# Patient Record
Sex: MALE | Race: WHITE | NOT HISPANIC OR LATINO | Employment: OTHER | URBAN - METROPOLITAN AREA
[De-identification: names, ages, dates, MRNs, and addresses within clinical notes are randomized per-mention and may not be internally consistent; named-entity substitution may affect disease eponyms.]

---

## 2017-08-24 RX ORDER — LEVOTHYROXINE SODIUM 200 UG/1
TABLET ORAL
Qty: 90 TABLET | Refills: 1 | Status: SHIPPED | OUTPATIENT
Start: 2017-08-24 | End: 2018-03-27 | Stop reason: SDUPTHER

## 2017-08-24 RX ORDER — TAMSULOSIN HYDROCHLORIDE 0.4 MG/1
CAPSULE ORAL
Qty: 90 CAPSULE | Refills: 1 | Status: SHIPPED | OUTPATIENT
Start: 2017-08-24 | End: 2018-03-27 | Stop reason: SDUPTHER

## 2017-08-24 RX ORDER — ATORVASTATIN CALCIUM 80 MG/1
TABLET, FILM COATED ORAL
Qty: 90 TABLET | Refills: 1 | Status: SHIPPED | OUTPATIENT
Start: 2017-08-24 | End: 2018-03-27 | Stop reason: SDUPTHER

## 2017-11-07 RX ORDER — METOPROLOL SUCCINATE 100 MG/1
TABLET, EXTENDED RELEASE ORAL
Qty: 90 TABLET | Refills: 1 | Status: SHIPPED | OUTPATIENT
Start: 2017-11-07 | End: 2018-03-27 | Stop reason: SDUPTHER

## 2018-03-27 ENCOUNTER — OFFICE VISIT (OUTPATIENT)
Dept: PRIMARY CARE CLINIC | Facility: CLINIC | Age: 78
End: 2018-03-27
Payer: MEDICARE

## 2018-03-27 ENCOUNTER — CLINICAL SUPPORT (OUTPATIENT)
Dept: PRIMARY CARE CLINIC | Facility: CLINIC | Age: 78
End: 2018-03-27
Payer: MEDICARE

## 2018-03-27 VITALS
TEMPERATURE: 99 F | BODY MASS INDEX: 34.59 KG/M2 | HEART RATE: 83 BPM | WEIGHT: 207.63 LBS | RESPIRATION RATE: 18 BRPM | DIASTOLIC BLOOD PRESSURE: 71 MMHG | SYSTOLIC BLOOD PRESSURE: 129 MMHG | OXYGEN SATURATION: 98 % | HEIGHT: 65 IN

## 2018-03-27 DIAGNOSIS — I10 ESSENTIAL HYPERTENSION: ICD-10-CM

## 2018-03-27 DIAGNOSIS — I10 ESSENTIAL HYPERTENSION: Primary | ICD-10-CM

## 2018-03-27 DIAGNOSIS — E78.5 HYPERLIPIDEMIA, UNSPECIFIED HYPERLIPIDEMIA TYPE: ICD-10-CM

## 2018-03-27 DIAGNOSIS — M17.32 POST-TRAUMATIC OSTEOARTHRITIS OF LEFT KNEE: ICD-10-CM

## 2018-03-27 DIAGNOSIS — E03.9 HYPOTHYROIDISM, UNSPECIFIED TYPE: ICD-10-CM

## 2018-03-27 DIAGNOSIS — Z12.5 PROSTATE CANCER SCREENING: ICD-10-CM

## 2018-03-27 DIAGNOSIS — K21.9 GASTROESOPHAGEAL REFLUX DISEASE, ESOPHAGITIS PRESENCE NOT SPECIFIED: ICD-10-CM

## 2018-03-27 DIAGNOSIS — J32.9 SINUSITIS, UNSPECIFIED CHRONICITY, UNSPECIFIED LOCATION: ICD-10-CM

## 2018-03-27 DIAGNOSIS — N40.0 BENIGN PROSTATIC HYPERPLASIA, UNSPECIFIED WHETHER LOWER URINARY TRACT SYMPTOMS PRESENT: ICD-10-CM

## 2018-03-27 DIAGNOSIS — H26.9 CATARACT, UNSPECIFIED CATARACT TYPE, UNSPECIFIED LATERALITY: ICD-10-CM

## 2018-03-27 LAB
ALBUMIN SERPL BCP-MCNC: 3.7 G/DL
ALP SERPL-CCNC: 74 U/L
ALT SERPL W/O P-5'-P-CCNC: 16 U/L
ANION GAP SERPL CALC-SCNC: 4 MMOL/L
AST SERPL-CCNC: 20 U/L
BASOPHILS # BLD AUTO: 0.04 K/UL
BASOPHILS NFR BLD: 0.9 %
BILIRUB SERPL-MCNC: 0.6 MG/DL
BUN SERPL-MCNC: 19 MG/DL
CALCIUM SERPL-MCNC: 8.5 MG/DL
CHLORIDE SERPL-SCNC: 107 MMOL/L
CHOLEST SERPL-MCNC: 67 MG/DL
CHOLEST/HDLC SERPL: 2.7 {RATIO}
CO2 SERPL-SCNC: 25 MMOL/L
COMPLEXED PSA SERPL-MCNC: 1.1 NG/ML
CREAT SERPL-MCNC: 1.4 MG/DL
DIFFERENTIAL METHOD: ABNORMAL
EOSINOPHIL # BLD AUTO: 0.2 K/UL
EOSINOPHIL NFR BLD: 3.9 %
ERYTHROCYTE [DISTWIDTH] IN BLOOD BY AUTOMATED COUNT: 14.1 %
EST. GFR  (AFRICAN AMERICAN): 55.2 ML/MIN/1.73 M^2
EST. GFR  (NON AFRICAN AMERICAN): 47.8 ML/MIN/1.73 M^2
GLUCOSE SERPL-MCNC: 108 MG/DL
HCT VFR BLD AUTO: 27.7 %
HDLC SERPL-MCNC: 25 MG/DL
HDLC SERPL: 37.3 %
HGB BLD-MCNC: 8.2 G/DL
IMM GRANULOCYTES # BLD AUTO: 0.02 K/UL
IMM GRANULOCYTES NFR BLD AUTO: 0.5 %
LDLC SERPL CALC-MCNC: 28.8 MG/DL
LYMPHOCYTES # BLD AUTO: 0.9 K/UL
LYMPHOCYTES NFR BLD: 21.3 %
MCH RBC QN AUTO: 27.1 PG
MCHC RBC AUTO-ENTMCNC: 29.6 G/DL
MCV RBC AUTO: 91 FL
MONOCYTES # BLD AUTO: 0.5 K/UL
MONOCYTES NFR BLD: 12.2 %
NEUTROPHILS # BLD AUTO: 2.7 K/UL
NEUTROPHILS NFR BLD: 61.2 %
NONHDLC SERPL-MCNC: 42 MG/DL
NRBC BLD-RTO: 0 /100 WBC
PLATELET # BLD AUTO: 140 K/UL
PMV BLD AUTO: 12.5 FL
POTASSIUM SERPL-SCNC: 4.3 MMOL/L
PROT SERPL-MCNC: 6.4 G/DL
RBC # BLD AUTO: 3.03 M/UL
SODIUM SERPL-SCNC: 136 MMOL/L
T4 FREE SERPL-MCNC: 1.24 NG/DL
TRIGL SERPL-MCNC: 66 MG/DL
TSH SERPL DL<=0.005 MIU/L-ACNC: 1.02 UIU/ML
WBC # BLD AUTO: 4.41 K/UL

## 2018-03-27 PROCEDURE — 3078F DIAST BP <80 MM HG: CPT | Mod: CPTII,S$GLB,, | Performed by: INTERNAL MEDICINE

## 2018-03-27 PROCEDURE — 84153 ASSAY OF PSA TOTAL: CPT

## 2018-03-27 PROCEDURE — 99499 UNLISTED E&M SERVICE: CPT | Mod: S$GLB,,, | Performed by: INTERNAL MEDICINE

## 2018-03-27 PROCEDURE — 99999 PR PBB SHADOW E&M-EST. PATIENT-LVL IV: CPT | Mod: PBBFAC,,, | Performed by: INTERNAL MEDICINE

## 2018-03-27 PROCEDURE — 99213 OFFICE O/P EST LOW 20 MIN: CPT | Mod: S$GLB,,, | Performed by: INTERNAL MEDICINE

## 2018-03-27 PROCEDURE — 80061 LIPID PANEL: CPT

## 2018-03-27 PROCEDURE — 84443 ASSAY THYROID STIM HORMONE: CPT

## 2018-03-27 PROCEDURE — 84439 ASSAY OF FREE THYROXINE: CPT

## 2018-03-27 PROCEDURE — 3074F SYST BP LT 130 MM HG: CPT | Mod: CPTII,S$GLB,, | Performed by: INTERNAL MEDICINE

## 2018-03-27 PROCEDURE — 80053 COMPREHEN METABOLIC PANEL: CPT

## 2018-03-27 PROCEDURE — 99999 PR PBB SHADOW E&M-EST. PATIENT-LVL II: CPT | Mod: PBBFAC,,,

## 2018-03-27 PROCEDURE — 85025 COMPLETE CBC W/AUTO DIFF WBC: CPT

## 2018-03-27 RX ORDER — AZITHROMYCIN 250 MG/1
TABLET, FILM COATED ORAL
Qty: 6 TABLET | Refills: 0 | Status: SHIPPED | OUTPATIENT
Start: 2018-03-27 | End: 2018-04-01

## 2018-03-27 RX ORDER — LEVOTHYROXINE SODIUM 200 UG/1
200 TABLET ORAL DAILY
Qty: 90 TABLET | Refills: 3 | Status: SHIPPED | OUTPATIENT
Start: 2018-03-27 | End: 2019-03-19 | Stop reason: SDUPTHER

## 2018-03-27 RX ORDER — TAMSULOSIN HYDROCHLORIDE 0.4 MG/1
1 CAPSULE ORAL DAILY
Qty: 90 CAPSULE | Refills: 3 | Status: SHIPPED | OUTPATIENT
Start: 2018-03-27 | End: 2019-03-19 | Stop reason: SDUPTHER

## 2018-03-27 RX ORDER — METHYLPREDNISOLONE 4 MG/1
TABLET ORAL
Qty: 1 PACKAGE | Refills: 0 | Status: SHIPPED | OUTPATIENT
Start: 2018-03-27 | End: 2019-04-25

## 2018-03-27 RX ORDER — METOPROLOL SUCCINATE 100 MG/1
100 TABLET, EXTENDED RELEASE ORAL DAILY
Qty: 90 TABLET | Refills: 3 | Status: SHIPPED | OUTPATIENT
Start: 2018-03-27 | End: 2019-03-19 | Stop reason: SDUPTHER

## 2018-03-27 RX ORDER — ATORVASTATIN CALCIUM 80 MG/1
80 TABLET, FILM COATED ORAL DAILY
Qty: 90 TABLET | Refills: 3 | Status: SHIPPED | OUTPATIENT
Start: 2018-03-27 | End: 2019-03-19 | Stop reason: SDUPTHER

## 2018-03-28 NOTE — PROGRESS NOTES
Subjective:       Patient ID: Hever Reyes Jr. is a 77 y.o. male.    Chief Complaint: Medication Refill    HPI  Pt is here for refill his meds clinically doing well no c/o refuse colonoscopy has BPH see urologist and had prostate surery x 2 no sob cphad recent eye exam cataract no tx yet c/o congestion coughing few days not better  Review of Systems    Objective:      Physical Exam   Constitutional: He is oriented to person, place, and time. He appears well-developed and well-nourished. No distress.   HENT:   Head: Normocephalic and atraumatic.   Right Ear: External ear normal.   Left Ear: External ear normal.   Mouth/Throat: Oropharynx is clear and moist. No oropharyngeal exudate.   Nasal congestion   Eyes: Conjunctivae and EOM are normal. Pupils are equal, round, and reactive to light. Right eye exhibits no discharge. Left eye exhibits no discharge.   Neck: Normal range of motion. Neck supple. No thyromegaly present.   Cardiovascular: Normal rate, regular rhythm, normal heart sounds and intact distal pulses.  Exam reveals no gallop and no friction rub.    No murmur heard.  Pulmonary/Chest: Effort normal and breath sounds normal. No respiratory distress. He has no wheezes. He has no rales. He exhibits no tenderness.   Abdominal: Soft. Bowel sounds are normal. He exhibits no distension. There is no tenderness. There is no rebound and no guarding.   Musculoskeletal: Normal range of motion. He exhibits no edema, tenderness or deformity.   Lymphadenopathy:     He has no cervical adenopathy.   Neurological: He is alert and oriented to person, place, and time.   Skin: Skin is warm and dry. Capillary refill takes less than 2 seconds. No rash noted. No erythema.   Psychiatric: He has a normal mood and affect. Judgment and thought content normal.   Nursing note and vitals reviewed.      Assessment:       1. Essential hypertension    2. Benign prostatic hyperplasia, unspecified whether lower urinary tract symptoms present     3. Hypothyroidism, unspecified type    4. Post-traumatic osteoarthritis of left knee    5. Prostate cancer screening    6. Hyperlipidemia, unspecified hyperlipidemia type    7. Gastroesophageal reflux disease, esophagitis presence not specified    8. Sinusitis, unspecified chronicity, unspecified location    9. Cataract, unspecified cataract type, unspecified laterality        Plan:       Essential hypertension  -     metoprolol succinate (TOPROL-XL) 100 MG 24 hr tablet; Take 1 tablet (100 mg total) by mouth once daily.  Dispense: 90 tablet; Refill: 3  -     CBC auto differential; Future; Expected date: 03/27/2018  -     Comprehensive metabolic panel; Future; Expected date: 03/27/2018  -     POCT EKG 12-LEAD (NOT FOR [facility] USE)  -     X-Ray Chest PA And Lateral; Future; Expected date: 03/27/2018  -     POCT URINE DIPSTICK WITHOUT MICROSCOPE    Benign prostatic hyperplasia, unspecified whether lower urinary tract symptoms present  Comments:  had surgeries x 2 stable no cancer  Orders:  -     tamsulosin (FLOMAX) 0.4 mg Cp24; Take 1 capsule (0.4 mg total) by mouth once daily.  Dispense: 90 capsule; Refill: 3    Hypothyroidism, unspecified type  -     levothyroxine (SYNTHROID) 200 MCG tablet; Take 1 tablet (200 mcg total) by mouth once daily.  Dispense: 90 tablet; Refill: 3  -     TSH; Future; Expected date: 03/27/2018  -     T4, free; Future; Expected date: 03/27/2018    Post-traumatic osteoarthritis of left knee  -     X-Ray Knee AP LAT with Sunrise Left; Future; Expected date: 03/28/2018    Prostate cancer screening  -     PSA, Screening; Future; Expected date: 03/27/2018    Hyperlipidemia, unspecified hyperlipidemia type  -     atorvastatin (LIPITOR) 80 MG tablet; Take 1 tablet (80 mg total) by mouth once daily.  Dispense: 90 tablet; Refill: 3  -     Lipid panel; Future; Expected date: 03/27/2018    Gastroesophageal reflux disease, esophagitis presence not specified  -     ranitidine (ZANTAC) 300 MG tablet; Take  1 tablet (300 mg total) by mouth once daily.  Dispense: 90 tablet; Refill: 3    Sinusitis, unspecified chronicity, unspecified location  -     azithromycin (Z-NA) 250 MG tablet; Take 2 tablets by mouth on day 1; Take 1 tablet by mouth on days 2-5  Dispense: 6 tablet; Refill: 0  -     methylPREDNISolone (MEDROL DOSEPACK) 4 mg tablet; use as directed  Dispense: 1 Package; Refill: 0    Cataract, unspecified cataract type, unspecified laterality

## 2018-03-28 NOTE — PROGRESS NOTES
Patient, Hever Reyes Jr. (MRN #[id number]), presented with a recent Platelet count less than 150 K/uL consistent with the definition of thrombocytopenia (ICD10 - D69.6).    Platelets   Date Value Ref Range Status   03/27/2018 140 (L) 150 - 350 K/uL Final     The patient's thrombocytopenia was monitored, evaluated, addressed and/or treated. This addendum to the medical record is made on 03/28/2018.

## 2018-04-09 ENCOUNTER — CLINICAL SUPPORT (OUTPATIENT)
Dept: PRIMARY CARE CLINIC | Facility: CLINIC | Age: 78
End: 2018-04-09
Payer: MEDICARE

## 2018-04-09 ENCOUNTER — OFFICE VISIT (OUTPATIENT)
Dept: PRIMARY CARE CLINIC | Facility: CLINIC | Age: 78
End: 2018-04-09
Payer: MEDICARE

## 2018-04-09 VITALS
BODY MASS INDEX: 35.12 KG/M2 | WEIGHT: 205.69 LBS | SYSTOLIC BLOOD PRESSURE: 134 MMHG | OXYGEN SATURATION: 97 % | HEART RATE: 69 BPM | DIASTOLIC BLOOD PRESSURE: 88 MMHG | HEIGHT: 64 IN | TEMPERATURE: 98 F | RESPIRATION RATE: 18 BRPM

## 2018-04-09 DIAGNOSIS — D64.9 ANEMIA, UNSPECIFIED TYPE: ICD-10-CM

## 2018-04-09 DIAGNOSIS — D64.9 ANEMIA, UNSPECIFIED TYPE: Primary | ICD-10-CM

## 2018-04-09 LAB
BASOPHILS # BLD AUTO: 0.03 K/UL
BASOPHILS NFR BLD: 0.5 %
DIFFERENTIAL METHOD: ABNORMAL
EOSINOPHIL # BLD AUTO: 0.2 K/UL
EOSINOPHIL NFR BLD: 3.3 %
ERYTHROCYTE [DISTWIDTH] IN BLOOD BY AUTOMATED COUNT: 14.2 %
FOLATE SERPL-MCNC: 17.6 NG/ML
HCT VFR BLD AUTO: 30.6 %
HGB BLD-MCNC: 9.2 G/DL
IMM GRANULOCYTES # BLD AUTO: 0.02 K/UL
IMM GRANULOCYTES NFR BLD AUTO: 0.3 %
IRON SERPL-MCNC: 150 UG/DL
LYMPHOCYTES # BLD AUTO: 1.4 K/UL
LYMPHOCYTES NFR BLD: 23.6 %
MCH RBC QN AUTO: 26.2 PG
MCHC RBC AUTO-ENTMCNC: 30.1 G/DL
MCV RBC AUTO: 87 FL
MONOCYTES # BLD AUTO: 0.6 K/UL
MONOCYTES NFR BLD: 10.6 %
NEUTROPHILS # BLD AUTO: 3.6 K/UL
NEUTROPHILS NFR BLD: 61.7 %
NRBC BLD-RTO: 0 /100 WBC
PLATELET # BLD AUTO: 164 K/UL
PMV BLD AUTO: 11.9 FL
RBC # BLD AUTO: 3.51 M/UL
RETICS/RBC NFR AUTO: 1.9 %
SATURATED IRON: 33 %
TOTAL IRON BINDING CAPACITY: 457 UG/DL
TRANSFERRIN SERPL-MCNC: 309 MG/DL
VIT B12 SERPL-MCNC: >2000 PG/ML
WBC # BLD AUTO: 5.84 K/UL

## 2018-04-09 PROCEDURE — 99999 PR PBB SHADOW E&M-EST. PATIENT-LVL III: CPT | Mod: PBBFAC,,, | Performed by: INTERNAL MEDICINE

## 2018-04-09 PROCEDURE — 85025 COMPLETE CBC W/AUTO DIFF WBC: CPT

## 2018-04-09 PROCEDURE — 82746 ASSAY OF FOLIC ACID SERUM: CPT

## 2018-04-09 PROCEDURE — 3079F DIAST BP 80-89 MM HG: CPT | Mod: CPTII,S$GLB,, | Performed by: INTERNAL MEDICINE

## 2018-04-09 PROCEDURE — 99213 OFFICE O/P EST LOW 20 MIN: CPT | Mod: 25,S$GLB,, | Performed by: INTERNAL MEDICINE

## 2018-04-09 PROCEDURE — 82607 VITAMIN B-12: CPT

## 2018-04-09 PROCEDURE — 83540 ASSAY OF IRON: CPT

## 2018-04-09 PROCEDURE — 99999 PR PBB SHADOW E&M-EST. PATIENT-LVL II: CPT | Mod: PBBFAC,,,

## 2018-04-09 PROCEDURE — 85045 AUTOMATED RETICULOCYTE COUNT: CPT

## 2018-04-09 PROCEDURE — 3075F SYST BP GE 130 - 139MM HG: CPT | Mod: CPTII,S$GLB,, | Performed by: INTERNAL MEDICINE

## 2018-04-09 PROCEDURE — 96372 THER/PROPH/DIAG INJ SC/IM: CPT | Mod: S$GLB,,, | Performed by: INTERNAL MEDICINE

## 2018-04-09 RX ORDER — CYANOCOBALAMIN 1000 UG/ML
2000 INJECTION, SOLUTION INTRAMUSCULAR; SUBCUTANEOUS
Status: COMPLETED | OUTPATIENT
Start: 2018-04-09 | End: 2018-04-09

## 2018-04-09 RX ADMIN — CYANOCOBALAMIN 2000 MCG: 1000 INJECTION, SOLUTION INTRAMUSCULAR; SUBCUTANEOUS at 02:04

## 2018-04-09 NOTE — PROGRESS NOTES
Pt ID verified by  and Name. Allergies verified. B12 2cc to R VG per MD order. No adverse reactions noted. Pt tolerated well.

## 2018-04-10 NOTE — PROGRESS NOTES
Subjective:       Patient ID: Hever Reyes Jr. is a 77 y.o. male.    Chief Complaint: Results    HPI  Pt is here for f/u labs anemia but not symptomatic told anemai brfore by is cardioogist given Fe to take refuse colonoscopy but anabelle cologuard told negative cardiologist  Review of Systems    Objective:      Physical Exam   Constitutional: He is oriented to person, place, and time. He appears well-developed and well-nourished. No distress.   HENT:   Head: Normocephalic and atraumatic.   Right Ear: External ear normal.   Left Ear: External ear normal.   Nose: Nose normal.   Mouth/Throat: Oropharynx is clear and moist. No oropharyngeal exudate.   Eyes: Conjunctivae and EOM are normal. Pupils are equal, round, and reactive to light. Right eye exhibits no discharge. Left eye exhibits no discharge.   Neck: Normal range of motion. Neck supple. No thyromegaly present.   Cardiovascular: Normal rate, regular rhythm, normal heart sounds and intact distal pulses.  Exam reveals no gallop and no friction rub.    No murmur heard.  Pulmonary/Chest: Effort normal and breath sounds normal. No respiratory distress. He has no wheezes. He has no rales. He exhibits no tenderness.   Abdominal: Soft. Bowel sounds are normal. He exhibits no distension. There is no tenderness. There is no rebound and no guarding.   Musculoskeletal: Normal range of motion. He exhibits no edema, tenderness or deformity.   Lymphadenopathy:     He has no cervical adenopathy.   Neurological: He is alert and oriented to person, place, and time.   Skin: Skin is warm and dry. Capillary refill takes less than 2 seconds. No rash noted. No erythema.   Psychiatric: He has a normal mood and affect. Judgment and thought content normal.   Nursing note and vitals reviewed.      Assessment:       1. Anemia, unspecified type        Plan:       Anemia, unspecified type  -     CBC auto differential; Future; Expected date: 04/09/2018  -     Iron and TIBC; Future; Expected  date: 04/09/2018  -     Vitamin B12; Future; Expected date: 04/09/2018  -     Folate; Future; Expected date: 04/09/2018  -     RETICULOCYTES; Future; Expected date: 04/09/2018  -     cyanocobalamin injection 2,000 mcg; Inject 2 mLs (2,000 mcg total) into the muscle one time.

## 2018-04-11 RX ORDER — FERROUS SULFATE 325(65) MG
325 TABLET ORAL 2 TIMES DAILY
Qty: 60 TABLET | Refills: 3 | Status: SHIPPED | OUTPATIENT
Start: 2018-04-11 | End: 2018-08-15 | Stop reason: SDUPTHER

## 2018-08-15 DIAGNOSIS — D64.9 ANEMIA, UNSPECIFIED TYPE: ICD-10-CM

## 2018-08-15 NOTE — TELEPHONE ENCOUNTER
----- Message from Lynda Ervin sent at 8/15/2018 12:57 PM CDT -----  Pt stopped in still waiting for 3 days  on refill on his Ferrous sulf 325mg  iron pill takes 2x per day can you also give extra refills and called into [facility] in [place]

## 2018-08-16 RX ORDER — FERROUS SULFATE 325(65) MG
325 TABLET ORAL 2 TIMES DAILY
Qty: 60 TABLET | Refills: 3 | Status: SHIPPED | OUTPATIENT
Start: 2018-08-16 | End: 2018-12-03 | Stop reason: SDUPTHER

## 2018-12-03 DIAGNOSIS — D64.9 ANEMIA, UNSPECIFIED TYPE: ICD-10-CM

## 2018-12-03 RX ORDER — FERROUS SULFATE 325(65) MG
TABLET ORAL
Qty: 60 TABLET | Refills: 3 | Status: SHIPPED | OUTPATIENT
Start: 2018-12-03 | End: 2019-04-22 | Stop reason: SDUPTHER

## 2019-03-19 DIAGNOSIS — I10 ESSENTIAL HYPERTENSION: ICD-10-CM

## 2019-03-19 DIAGNOSIS — N40.0 BENIGN PROSTATIC HYPERPLASIA, UNSPECIFIED WHETHER LOWER URINARY TRACT SYMPTOMS PRESENT: ICD-10-CM

## 2019-03-19 DIAGNOSIS — E78.5 HYPERLIPIDEMIA, UNSPECIFIED HYPERLIPIDEMIA TYPE: ICD-10-CM

## 2019-03-19 DIAGNOSIS — K21.9 GASTROESOPHAGEAL REFLUX DISEASE, ESOPHAGITIS PRESENCE NOT SPECIFIED: ICD-10-CM

## 2019-03-19 DIAGNOSIS — E03.9 HYPOTHYROIDISM, UNSPECIFIED TYPE: ICD-10-CM

## 2019-03-20 RX ORDER — ATORVASTATIN CALCIUM 80 MG/1
TABLET, FILM COATED ORAL
Qty: 90 TABLET | Refills: 3 | Status: SHIPPED | OUTPATIENT
Start: 2019-03-20 | End: 2020-03-02

## 2019-03-20 RX ORDER — METOPROLOL SUCCINATE 100 MG/1
TABLET, EXTENDED RELEASE ORAL
Qty: 90 TABLET | Refills: 3 | Status: SHIPPED | OUTPATIENT
Start: 2019-03-20 | End: 2020-03-29

## 2019-03-20 RX ORDER — LEVOTHYROXINE SODIUM 200 UG/1
TABLET ORAL
Qty: 90 TABLET | Refills: 3 | Status: SHIPPED | OUTPATIENT
Start: 2019-03-20 | End: 2020-04-28

## 2019-03-20 RX ORDER — TAMSULOSIN HYDROCHLORIDE 0.4 MG/1
CAPSULE ORAL
Qty: 90 CAPSULE | Refills: 3 | Status: SHIPPED | OUTPATIENT
Start: 2019-03-20 | End: 2020-04-28

## 2019-04-22 DIAGNOSIS — D64.9 ANEMIA, UNSPECIFIED TYPE: ICD-10-CM

## 2019-04-23 RX ORDER — FERROUS SULFATE 325(65) MG
TABLET ORAL
Qty: 60 TABLET | Refills: 3 | Status: SHIPPED | OUTPATIENT
Start: 2019-04-23 | End: 2019-08-26 | Stop reason: SDUPTHER

## 2019-04-25 ENCOUNTER — OFFICE VISIT (OUTPATIENT)
Dept: PRIMARY CARE CLINIC | Facility: CLINIC | Age: 79
End: 2019-04-25
Payer: MEDICARE

## 2019-04-25 VITALS
HEART RATE: 66 BPM | TEMPERATURE: 98 F | DIASTOLIC BLOOD PRESSURE: 75 MMHG | WEIGHT: 205.5 LBS | SYSTOLIC BLOOD PRESSURE: 126 MMHG | RESPIRATION RATE: 18 BRPM | OXYGEN SATURATION: 95 % | BODY MASS INDEX: 34.24 KG/M2 | HEIGHT: 65 IN

## 2019-04-25 DIAGNOSIS — M54.50 ACUTE LEFT-SIDED LOW BACK PAIN WITHOUT SCIATICA: ICD-10-CM

## 2019-04-25 DIAGNOSIS — R10.12 LEFT UPPER QUADRANT PAIN: ICD-10-CM

## 2019-04-25 DIAGNOSIS — M54.9 ACUTE LEFT-SIDED BACK PAIN, UNSPECIFIED BACK LOCATION: Primary | ICD-10-CM

## 2019-04-25 LAB
BILIRUB SERPL-MCNC: ABNORMAL MG/DL
BLOOD URINE, POC: ABNORMAL
COLOR, POC UA: YELLOW
GLUCOSE UR QL STRIP: NORMAL
KETONES UR QL STRIP: ABNORMAL
LEUKOCYTE ESTERASE URINE, POC: ABNORMAL
NITRITE, POC UA: ABNORMAL
PH, POC UA: 5
PROTEIN, POC: ABNORMAL
SPECIFIC GRAVITY, POC UA: 1.02
UROBILINOGEN, POC UA: NORMAL

## 2019-04-25 PROCEDURE — 1101F PT FALLS ASSESS-DOCD LE1/YR: CPT | Mod: CPTII,S$GLB,, | Performed by: INTERNAL MEDICINE

## 2019-04-25 PROCEDURE — 81002 POCT URINE DIPSTICK WITHOUT MICROSCOPE: ICD-10-PCS | Mod: S$GLB,,, | Performed by: INTERNAL MEDICINE

## 2019-04-25 PROCEDURE — 99213 OFFICE O/P EST LOW 20 MIN: CPT | Mod: 25,S$GLB,, | Performed by: INTERNAL MEDICINE

## 2019-04-25 PROCEDURE — 3078F PR MOST RECENT DIASTOLIC BLOOD PRESSURE < 80 MM HG: ICD-10-PCS | Mod: CPTII,S$GLB,, | Performed by: INTERNAL MEDICINE

## 2019-04-25 PROCEDURE — 1101F PR PT FALLS ASSESS DOC 0-1 FALLS W/OUT INJ PAST YR: ICD-10-PCS | Mod: CPTII,S$GLB,, | Performed by: INTERNAL MEDICINE

## 2019-04-25 PROCEDURE — 3074F PR MOST RECENT SYSTOLIC BLOOD PRESSURE < 130 MM HG: ICD-10-PCS | Mod: CPTII,S$GLB,, | Performed by: INTERNAL MEDICINE

## 2019-04-25 PROCEDURE — 81002 URINALYSIS NONAUTO W/O SCOPE: CPT | Mod: S$GLB,,, | Performed by: INTERNAL MEDICINE

## 2019-04-25 PROCEDURE — 99999 PR PBB SHADOW E&M-EST. PATIENT-LVL IV: ICD-10-PCS | Mod: PBBFAC,,, | Performed by: INTERNAL MEDICINE

## 2019-04-25 PROCEDURE — 3074F SYST BP LT 130 MM HG: CPT | Mod: CPTII,S$GLB,, | Performed by: INTERNAL MEDICINE

## 2019-04-25 PROCEDURE — 3078F DIAST BP <80 MM HG: CPT | Mod: CPTII,S$GLB,, | Performed by: INTERNAL MEDICINE

## 2019-04-25 PROCEDURE — 99999 PR PBB SHADOW E&M-EST. PATIENT-LVL IV: CPT | Mod: PBBFAC,,, | Performed by: INTERNAL MEDICINE

## 2019-04-25 PROCEDURE — 99213 PR OFFICE/OUTPT VISIT, EST, LEVL III, 20-29 MIN: ICD-10-PCS | Mod: 25,S$GLB,, | Performed by: INTERNAL MEDICINE

## 2019-04-25 RX ORDER — METHYLPREDNISOLONE 4 MG/1
TABLET ORAL
Qty: 1 PACKAGE | Refills: 0 | Status: SHIPPED | OUTPATIENT
Start: 2019-04-25 | End: 2019-05-01

## 2019-04-25 NOTE — PROGRESS NOTES
Subjective:       Patient ID: Hever Reyes Jr. is a 78 y.o. male.    Chief Complaint: Back Pain (lower left side back pain x 2 days)    HPI patient complained of severe left-sided back pain seen yesterday which come and go and it come if the severe 10-10 also pain in the abdomen on the left side to no nausea vomiting trauma no fall no lifting heavy is objects no radiculopathy no hematuria dysuria no skin rash patient deny any history of kidney stone also have chronic allergy nasal drip congestion using nose drops complained of some severe right a itching if he does not use his nose drop no short of breath chest pain  Review of Systems    Objective:      Physical Exam   Constitutional: He is oriented to person, place, and time. He appears well-developed and well-nourished. No distress.   HENT:   Head: Normocephalic and atraumatic.   Right Ear: External ear normal.   Left Ear: External ear normal.   Nose: Nose normal.   Mouth/Throat: Oropharynx is clear and moist. No oropharyngeal exudate.   Eyes: Pupils are equal, round, and reactive to light. Conjunctivae and EOM are normal. Right eye exhibits no discharge. Left eye exhibits no discharge.   Neck: Normal range of motion. Neck supple. No thyromegaly present.   Cardiovascular: Normal rate, regular rhythm, normal heart sounds and intact distal pulses. Exam reveals no gallop and no friction rub.   No murmur heard.  Pulmonary/Chest: Effort normal and breath sounds normal. No respiratory distress. He has no wheezes. He has no rales. He exhibits no tenderness.   Abdominal: Soft. Bowel sounds are normal. He exhibits no distension. There is no tenderness. There is no rebound and no guarding.   Musculoskeletal: Normal range of motion. He exhibits tenderness (Tenderness with palpation on the left flying left CVA no skin rash pain radiated to the front of the abdomen). He exhibits no edema or deformity.   Lymphadenopathy:     He has no cervical adenopathy.   Neurological: He  is alert and oriented to person, place, and time.   Skin: Skin is warm and dry. Capillary refill takes less than 2 seconds. No rash noted. No erythema.   Psychiatric: He has a normal mood and affect. Judgment and thought content normal.   Nursing note and vitals reviewed.      Assessment:       1. Acute left-sided back pain, unspecified back location    2. Left upper quadrant pain    3. Acute left-sided low back pain without sciatica        Plan:       Acute left-sided back pain, unspecified back location  Comments:  Need to rule out kidney stone abdominal aortic aneurysm versus early shingle  Orders:  -     POCT URINE DIPSTICK WITHOUT MICROSCOPE  -     methylPREDNISolone (MEDROL DOSEPACK) 4 mg tablet; use as directed  Dispense: 1 Package; Refill: 0    Left upper quadrant pain  -     US Abdomen Complete; Future; Expected date: 05/02/2019    Acute left-sided low back pain without sciatica  -     X-Ray Lumbar Spine Ap And Lateral; Future; Expected date: 04/25/2019  -     methylPREDNISolone (MEDROL DOSEPACK) 4 mg tablet; use as directed  Dispense: 1 Package; Refill: 0

## 2019-04-26 ENCOUNTER — TELEPHONE (OUTPATIENT)
Dept: PRIMARY CARE CLINIC | Facility: CLINIC | Age: 79
End: 2019-04-26

## 2019-04-26 DIAGNOSIS — N20.0 KIDNEY STONE: Primary | ICD-10-CM

## 2019-04-26 DIAGNOSIS — K80.20 GALLSTONES: ICD-10-CM

## 2019-04-26 NOTE — TELEPHONE ENCOUNTER
Please sign referrals        Please call the patient regarding his abdominal ultrasound have a multiple gallstone and also small kidney stone in both kidneys his lumbar spine x-ray results still pending if his back pain persists patient id appointment to see urologist Dr. Mckay or Dr. Baldwin for kidney stone and surgeon Dr. Marcos for gallstone

## 2019-04-30 NOTE — PROGRESS NOTES
"Subjective:      Hever Reyes Jr. is a 78 y.o. male who was referred by Dr. Carvalho for evaluation of his nephrolithiasis.    The patient reports left flank pain x 2 days that has now resolved. At the time an abdominal US was completed that showed bilateral non obstructing renal stones. No masses or hydronephrosis was noted.       Denies flank/abdominal pain for the last week. Denies urinary symptoms- dysuria, frequency and urgency. Denies hematuria, N/V and fever/chills. Denies a history of stones and recurrent UTIs. States that he "doesn't drink water and loves salt."      The following portions of the patient's history were reviewed and updated as appropriate: allergies, current medications, past family history, past medical history, past social history, past surgical history and problem list.    Review of Systems  Constitutional: no fever or chills  ENT: no nasal congestion or sore throat  Respiratory: no cough or shortness of breath  Cardiovascular: no chest pain or palpitations  Gastrointestinal: no nausea or vomiting, tolerating diet  Genitourinary: as per HPI  Hematologic/Lymphatic: no easy bruising or lymphadenopathy  Musculoskeletal: no arthralgias or myalgias  Neurological: no seizures or tremors  Behavioral/Psych: no auditory or visual hallucinations     Objective:   Vitals:    Vitals:    05/01/19 1417   BP: (!) 154/76   Pulse: 69       Physical Exam   General: alert and oriented, no acute distress  Head: normocephalic, atraumatic  Neck: supple, no lymphadenopathy, normal ROM, no masses  Respiratory: Symmetric expansion, non-labored breathing  Cardiovascular: regular rate and rhythm, nomal pulses, no peripheral edema  Abdomen: soft, non tender, non distended, no palpable masses, no hernias, no hepatomegaly or splenomegaly  Genitourinary: deferred   Lymphatic: no inguinal nodes  Skin: normal coloration and turgor, no rashes, no suspicious skin lesions noted  Neuro: alert and oriented x3, no gross " "deficits  Psych: normal judgment and insight, normal mood/affect and non-anxious  No CVA tenderness    Lab Review   Urinalysis demonstrates positive for protein (trace)  Lab Results   Component Value Date    WBC 5.84 04/09/2018    HGB 9.2 (L) 04/09/2018    HCT 30.6 (L) 04/09/2018    MCV 87 04/09/2018     04/09/2018     Lab Results   Component Value Date    CREATININE 1.4 03/27/2018    BUN 19 03/27/2018     Lab Results   Component Value Date    PSA 1.1 03/27/2018     Imaging   (all images personally reviewed; agree with report below)  JESS (4/25/19)- "Nonobstructing bilateral renal calculi.Nonobstructing bilateral renal calculi."    Assessment:   Nephrolithiasis    Plan:   Hever was seen today for nephrolithiasis.    Diagnoses and all orders for this visit:    Nephrolithiasis  -     X-Ray KUB; Future  -     US Retroperitoneal Complete (Kidney and; Future    Plan:  --Non obstructing stones noted on US. No hydro noted.   --Recommend general preventive measures, to include increased hydration, low Na diet, and increased citrus intake  --Follow up in 6 months with KUB and JESS       "

## 2019-05-01 ENCOUNTER — OFFICE VISIT (OUTPATIENT)
Dept: UROLOGY | Facility: CLINIC | Age: 79
End: 2019-05-01
Payer: MEDICARE

## 2019-05-01 VITALS
SYSTOLIC BLOOD PRESSURE: 154 MMHG | WEIGHT: 205 LBS | BODY MASS INDEX: 34.16 KG/M2 | HEART RATE: 69 BPM | DIASTOLIC BLOOD PRESSURE: 76 MMHG | HEIGHT: 65 IN

## 2019-05-01 DIAGNOSIS — N20.0 NEPHROLITHIASIS: Primary | ICD-10-CM

## 2019-05-01 PROCEDURE — 99203 PR OFFICE/OUTPT VISIT, NEW, LEVL III, 30-44 MIN: ICD-10-PCS | Mod: S$GLB,,, | Performed by: NURSE PRACTITIONER

## 2019-05-01 PROCEDURE — 3077F SYST BP >= 140 MM HG: CPT | Mod: CPTII,S$GLB,, | Performed by: NURSE PRACTITIONER

## 2019-05-01 PROCEDURE — 1101F PT FALLS ASSESS-DOCD LE1/YR: CPT | Mod: CPTII,S$GLB,, | Performed by: NURSE PRACTITIONER

## 2019-05-01 PROCEDURE — 3077F PR MOST RECENT SYSTOLIC BLOOD PRESSURE >= 140 MM HG: ICD-10-PCS | Mod: CPTII,S$GLB,, | Performed by: NURSE PRACTITIONER

## 2019-05-01 PROCEDURE — 3078F DIAST BP <80 MM HG: CPT | Mod: CPTII,S$GLB,, | Performed by: NURSE PRACTITIONER

## 2019-05-01 PROCEDURE — 99203 OFFICE O/P NEW LOW 30 MIN: CPT | Mod: S$GLB,,, | Performed by: NURSE PRACTITIONER

## 2019-05-01 PROCEDURE — 1101F PR PT FALLS ASSESS DOC 0-1 FALLS W/OUT INJ PAST YR: ICD-10-PCS | Mod: CPTII,S$GLB,, | Performed by: NURSE PRACTITIONER

## 2019-05-01 PROCEDURE — 3078F PR MOST RECENT DIASTOLIC BLOOD PRESSURE < 80 MM HG: ICD-10-PCS | Mod: CPTII,S$GLB,, | Performed by: NURSE PRACTITIONER

## 2019-05-01 RX ORDER — AMOXICILLIN 500 MG
CAPSULE ORAL DAILY
COMMUNITY
End: 2021-12-08 | Stop reason: SDUPTHER

## 2019-05-01 RX ORDER — ASPIRIN 81 MG/1
81 TABLET ORAL DAILY
Status: ON HOLD | COMMUNITY
End: 2020-12-14 | Stop reason: HOSPADM

## 2019-05-01 NOTE — LETTER
May 1, 2019      Hever Carvalho MD  [address] [place] [address]           [facility] [address], [address] [address]  Phone: [phone]  Fax: [phone]          Patient: Hever Reyes Jr.   MR Number: [id number]   YOB: 1940   Date of Visit: 5/1/2019       Dear Dr. Hever Carvalho:    Thank you for referring Hever Reyes to me for evaluation. Attached you will find relevant portions of my assessment and plan of care.    If you have questions, please do not hesitate to call me. I look forward to following Hever Reyes along with you.    Sincerely,    Cira Grossman, SHLOMO    Enclosure  CC:  No Recipients    If you would like to receive this communication electronically, please contact [email or web address] or [phone] to request more information on [employer] Link access.    For providers and/or their staff who would like to refer a patient to [facility], please contact us through our one-stop-shop provider referral line, [facility], at [phone].    If you feel you have received this communication in error or would no longer like to receive these types of communications, please e-mail [email or web address]

## 2019-05-02 ENCOUNTER — OFFICE VISIT (OUTPATIENT)
Dept: SURGERY | Facility: CLINIC | Age: 79
End: 2019-05-02
Payer: MEDICARE

## 2019-05-02 VITALS
HEART RATE: 60 BPM | WEIGHT: 201.75 LBS | DIASTOLIC BLOOD PRESSURE: 72 MMHG | SYSTOLIC BLOOD PRESSURE: 133 MMHG | BODY MASS INDEX: 33.57 KG/M2

## 2019-05-02 DIAGNOSIS — K80.20 CALCULUS OF GALLBLADDER WITHOUT CHOLECYSTITIS WITHOUT OBSTRUCTION: Primary | ICD-10-CM

## 2019-05-02 PROCEDURE — 1101F PT FALLS ASSESS-DOCD LE1/YR: CPT | Mod: CPTII,S$GLB,, | Performed by: SURGERY

## 2019-05-02 PROCEDURE — 3078F DIAST BP <80 MM HG: CPT | Mod: CPTII,S$GLB,, | Performed by: SURGERY

## 2019-05-02 PROCEDURE — 3075F SYST BP GE 130 - 139MM HG: CPT | Mod: CPTII,S$GLB,, | Performed by: SURGERY

## 2019-05-02 PROCEDURE — 1101F PR PT FALLS ASSESS DOC 0-1 FALLS W/OUT INJ PAST YR: ICD-10-PCS | Mod: CPTII,S$GLB,, | Performed by: SURGERY

## 2019-05-02 PROCEDURE — 3078F PR MOST RECENT DIASTOLIC BLOOD PRESSURE < 80 MM HG: ICD-10-PCS | Mod: CPTII,S$GLB,, | Performed by: SURGERY

## 2019-05-02 PROCEDURE — 99204 OFFICE O/P NEW MOD 45 MIN: CPT | Mod: S$GLB,,, | Performed by: SURGERY

## 2019-05-02 PROCEDURE — 99999 PR PBB SHADOW E&M-EST. PATIENT-LVL IV: CPT | Mod: PBBFAC,,, | Performed by: SURGERY

## 2019-05-02 PROCEDURE — 99999 PR PBB SHADOW E&M-EST. PATIENT-LVL IV: ICD-10-PCS | Mod: PBBFAC,,, | Performed by: SURGERY

## 2019-05-02 PROCEDURE — 99204 PR OFFICE/OUTPT VISIT, NEW, LEVL IV, 45-59 MIN: ICD-10-PCS | Mod: S$GLB,,, | Performed by: SURGERY

## 2019-05-02 PROCEDURE — 3075F PR MOST RECENT SYSTOLIC BLOOD PRESS GE 130-139MM HG: ICD-10-PCS | Mod: CPTII,S$GLB,, | Performed by: SURGERY

## 2019-05-02 RX ORDER — ENOXAPARIN SODIUM 300 MG/3ML
40 INJECTION INTRAVENOUS; SUBCUTANEOUS
Status: CANCELLED | OUTPATIENT
Start: 2019-05-02

## 2019-05-02 NOTE — LETTER
May 3, 2019      Hever Carvalho MD  [address] [place] [address]           [facility]  [address], [facility] [address] [place] [address]  Phone: [phone]  Fax: [phone]          Patient: Hever Reyes Jr.   MR Number: [id number]   YOB: 1940   Date of Visit: 5/2/2019       Dear Dr. Hever Carvalho:    Thank you for referring Hever Reyes to me for evaluation. Attached you will find relevant portions of my assessment and plan of care.    If you have questions, please do not hesitate to call me. I look forward to following Hever Reyes along with you.    Sincerely,    Liam Ordonez MD    Enclosure  CC:  No Recipients    If you would like to receive this communication electronically, please contact [email or web address] or [phone] to request more information on [employer] Link access.    For providers and/or their staff who would like to refer a patient to [facility], please contact us through our one-stop-shop provider referral line, [facility], at [phone].    If you feel you have received this communication in error or would no longer like to receive these types of communications, please e-mail [email or web address]

## 2019-05-03 NOTE — PROGRESS NOTES
History & Physical    SUBJECTIVE:     History of Present Illness:  Patient is a 78 y.o. male presents with gallstones.  States he also has a history of kidney stones, and passed 1 past few months.  He had an ultrasound of the abdomen showed these medium sized gallstones, and was sent to me for evaluation.  Discussed with patient if he had symptoms abdominal pain after meals or pain with spicy or fatty foods and he states he does not significantly remember this or have significant pain in his right upper quadrant.  Discussed risks and benefits of observation versus proceeding with surgery.  Patient elects to proceed with surgery, laparoscopic cholecystectomy.  Risks and benefits of procedure discussed extensively.    Chief Complaint   Patient presents with    Consult       Review of patient's allergies indicates:  No Known Allergies    Current Outpatient Medications   Medication Sig Dispense Refill    aspirin (ECOTRIN) 81 MG EC tablet Take 81 mg by mouth once daily.      atorvastatin (LIPITOR) 80 MG tablet TAKE 1 TABLET BY MOUTH ONCE DAILY 90 tablet 3    ferrous sulfate (FEOSOL) 325 mg (65 mg iron) Tab tablet TAKE 1 TABLET BY MOUTH TWICE DAILY 60 tablet 3    fish oil-omega-3 fatty acids 300-1,000 mg capsule Take by mouth once daily.      levothyroxine (SYNTHROID) 200 MCG tablet TAKE 1 TABLET BY MOUTH ONCE DAILY 90 tablet 3    metoprolol succinate (TOPROL-XL) 100 MG 24 hr tablet TAKE 1 TABLET BY MOUTH ONCE DAILY 90 tablet 3    multivitamin capsule Take 1 capsule by mouth once daily.      ranitidine (ZANTAC) 300 MG tablet TAKE 1 TABLET BY MOUTH ONCE DAILY 90 tablet 3    tamsulosin (FLOMAX) 0.4 mg Cap TAKE 1 CAPSULE BY MOUTH ONCE DAILY 90 capsule 3     No current facility-administered medications for this visit.        Past Medical History:   Diagnosis Date    Anemia     BPH (benign prostatic hyperplasia)     Coronary artery disease     GERD (gastroesophageal reflux disease)     HLD (hyperlipidemia)      Hypertension     Hyperthyroidism     Nephrolithiasis     Osteoarthritis     Sciatica     Sinusitis      Past Surgical History:   Procedure Laterality Date    CATARACT EXTRACTION      CORONARY ANGIOPLASTY WITH STENT PLACEMENT      PROSTATE BIOPSY       Family History   Family history unknown: Yes     Social History     Tobacco Use    Smoking status: Never Smoker    Smokeless tobacco: Never Used   Substance Use Topics    Alcohol use: No    Drug use: No        Review of Systems:  Review of Systems   Constitutional: Negative for appetite change, fatigue, fever and unexpected weight change.   HENT: Negative for sore throat and trouble swallowing.    Eyes: Negative.    Respiratory: Negative for cough, shortness of breath and wheezing.    Cardiovascular: Negative for chest pain and leg swelling.   Gastrointestinal: Positive for abdominal pain (occasional, but very mild). Negative for abdominal distention, blood in stool, constipation, diarrhea, nausea and vomiting.        Some flank pain   Endocrine: Negative.    Genitourinary: Negative.    Musculoskeletal: Negative for back pain.   Skin: Negative.  Negative for rash.   Allergic/Immunologic: Negative.    Neurological: Negative.    Hematological: Negative.    Psychiatric/Behavioral: Negative for confusion.       OBJECTIVE:     Vital Signs (Most Recent)  Pulse: 60 (05/02/19 1513)  BP: 133/72 (05/02/19 1513)     91.5 kg (201 lb 11.5 oz)     Physical Exam:  Physical Exam   Constitutional: He is oriented to person, place, and time. He appears well-developed and well-nourished.   HENT:   Head: Normocephalic and atraumatic.   Eyes: EOM are normal.   Neck: Normal range of motion.   Cardiovascular: Normal rate and normal heart sounds.   Pulmonary/Chest: Effort normal.   Abdominal: Soft. Bowel sounds are normal. He exhibits no distension. There is no tenderness. There is no rebound. No hernia.   Musculoskeletal: Normal range of motion.   Neurological: He is alert and  oriented to person, place, and time.   Skin: Skin is warm and dry. Capillary refill takes less than 2 seconds.   Psychiatric: He has a normal mood and affect. His behavior is normal.   Nursing note and vitals reviewed.      Laboratory  CBC: Reviewed  CMP: Reviewed  wnl    Diagnostic Results:  US: Reviewed  Gallstones    ASSESSMENT/PLAN:     Symptomatic cholelithiasis    PLAN:Plan     Lap dion

## 2019-05-06 PROBLEM — K80.20 CALCULUS OF GALLBLADDER WITHOUT CHOLECYSTITIS WITHOUT OBSTRUCTION: Status: ACTIVE | Noted: 2019-05-06

## 2019-05-15 ENCOUNTER — OFFICE VISIT (OUTPATIENT)
Dept: SURGERY | Facility: CLINIC | Age: 79
End: 2019-05-15
Payer: MEDICARE

## 2019-05-15 DIAGNOSIS — Z98.890 POST-OPERATIVE STATE: Primary | ICD-10-CM

## 2019-05-15 PROCEDURE — 99024 POSTOP FOLLOW-UP VISIT: CPT | Mod: S$GLB,,, | Performed by: SURGERY

## 2019-05-15 PROCEDURE — 99024 PR POST-OP FOLLOW-UP VISIT: ICD-10-PCS | Mod: S$GLB,,, | Performed by: SURGERY

## 2019-05-15 PROCEDURE — 99999 PR PBB SHADOW E&M-EST. PATIENT-LVL II: CPT | Mod: PBBFAC,,, | Performed by: SURGERY

## 2019-05-15 PROCEDURE — 99999 PR PBB SHADOW E&M-EST. PATIENT-LVL II: ICD-10-PCS | Mod: PBBFAC,,, | Performed by: SURGERY

## 2019-05-15 NOTE — PROGRESS NOTES
Hever Reyes Jr. is a 78 y.o. male patient.   Patient is 2 weeks status post laparoscopic cholecystectomy.  Patient is doing well, tolerating diet, normal bowel movements.  States he had a little discomfort in his right side after significant activity yesterday, he went to graduation.  He denies any complaints after eating, and has eaten several spicy and fatty meal since surgery.  Incisions are well healed.    No diagnosis found.  Past Medical History:   Diagnosis Date    Anemia     BPH (benign prostatic hyperplasia)     Coronary artery disease     GERD (gastroesophageal reflux disease)     HLD (hyperlipidemia)     Hypertension     Hyperthyroidism     Nephrolithiasis     Osteoarthritis     Sciatica     Sinusitis      No past surgical history pertinent negatives on file.  Scheduled Meds:  Continuous Infusions:  PRN Meds:    Review of patient's allergies indicates:  No Known Allergies  There are no hospital problems to display for this patient.    There were no vitals taken for this visit.    Subjective:   Diet: Adequate intake.  Patient reports no nausea.    Activity level: Returning to normal.    Pain control: Well controlled.      Objective:  Vital signs (most recent): There were no vitals taken for this visit.  General appearance: Comfortable.    Lungs:  Normal effort.    Heart: Normal rate.    Abdomen: Abdomen is soft.    Bowel sounds:  Bowel sounds are normal.    Tenderness: There is no abdominal tenderness tenderness.    Wound:  Clean.  There is no dehiscence.    Path- Cholelithiasis, multiple small gallstones   Assessment:   Condition: In stable condition.       S/p Lap dion  RTC PRN  Doing well       Liam Ordonez MD  5/15/2019

## 2019-08-26 DIAGNOSIS — D64.9 ANEMIA, UNSPECIFIED TYPE: ICD-10-CM

## 2019-08-26 RX ORDER — FERROUS SULFATE 325(65) MG
TABLET ORAL
Qty: 60 TABLET | Refills: 3 | Status: SHIPPED | OUTPATIENT
Start: 2019-08-26 | End: 2019-12-27

## 2019-11-01 ENCOUNTER — PATIENT OUTREACH (OUTPATIENT)
Dept: ADMINISTRATIVE | Facility: OTHER | Age: 79
End: 2019-11-01

## 2019-11-10 ENCOUNTER — PATIENT OUTREACH (OUTPATIENT)
Dept: ADMINISTRATIVE | Facility: HOSPITAL | Age: 79
End: 2019-11-10

## 2019-12-03 NOTE — TELEPHONE ENCOUNTER
Please order and send a new med to replace ranitidine     ----- Message from Lynda Ervin sent at 12/3/2019  2:37 PM CST -----  Pt got a letter about his Ranitidine tablets being recalled from [facility] pharmacy in [place]. He wants to see what he needs to do for this meds. Does he stop it and you call order something else or does he stay on it. Call him at [phone].

## 2019-12-04 RX ORDER — FAMOTIDINE 20 MG/1
20 TABLET, FILM COATED ORAL DAILY
Qty: 90 TABLET | Refills: 3 | Status: SHIPPED | OUTPATIENT
Start: 2019-12-04 | End: 2020-12-09 | Stop reason: CLARIF

## 2019-12-26 DIAGNOSIS — D64.9 ANEMIA, UNSPECIFIED TYPE: ICD-10-CM

## 2019-12-27 RX ORDER — FERROUS SULFATE 325(65) MG
TABLET ORAL
Qty: 60 TABLET | Refills: 0 | Status: SHIPPED | OUTPATIENT
Start: 2019-12-27 | End: 2020-02-04

## 2020-02-03 DIAGNOSIS — D64.9 ANEMIA, UNSPECIFIED TYPE: ICD-10-CM

## 2020-02-04 RX ORDER — FERROUS SULFATE 325(65) MG
TABLET ORAL
Qty: 60 TABLET | Refills: 0 | Status: SHIPPED | OUTPATIENT
Start: 2020-02-04 | End: 2020-03-06

## 2020-02-29 DIAGNOSIS — E78.5 HYPERLIPIDEMIA, UNSPECIFIED HYPERLIPIDEMIA TYPE: ICD-10-CM

## 2020-03-02 RX ORDER — ATORVASTATIN CALCIUM 80 MG/1
TABLET, FILM COATED ORAL
Qty: 90 TABLET | Refills: 0 | Status: SHIPPED | OUTPATIENT
Start: 2020-03-02 | End: 2020-07-28

## 2020-03-06 DIAGNOSIS — D64.9 ANEMIA, UNSPECIFIED TYPE: ICD-10-CM

## 2020-03-06 RX ORDER — FERROUS SULFATE 325(65) MG
TABLET ORAL
Qty: 90 TABLET | Refills: 0 | Status: SHIPPED | OUTPATIENT
Start: 2020-03-06 | End: 2020-04-23

## 2020-03-29 DIAGNOSIS — I10 ESSENTIAL HYPERTENSION: ICD-10-CM

## 2020-03-29 RX ORDER — METOPROLOL SUCCINATE 100 MG/1
TABLET, EXTENDED RELEASE ORAL
Qty: 90 TABLET | Refills: 0 | Status: SHIPPED | OUTPATIENT
Start: 2020-03-29 | End: 2020-04-24

## 2020-04-22 DIAGNOSIS — D64.9 ANEMIA, UNSPECIFIED TYPE: ICD-10-CM

## 2020-04-23 RX ORDER — FERROUS SULFATE 325(65) MG
TABLET ORAL
Qty: 90 TABLET | Refills: 0 | Status: SHIPPED | OUTPATIENT
Start: 2020-04-23 | End: 2020-04-24

## 2020-04-24 DIAGNOSIS — I10 ESSENTIAL HYPERTENSION: ICD-10-CM

## 2020-04-24 DIAGNOSIS — D64.9 ANEMIA, UNSPECIFIED TYPE: ICD-10-CM

## 2020-04-24 RX ORDER — FERROUS SULFATE 325(65) MG
TABLET ORAL
Qty: 90 TABLET | Refills: 0 | Status: SHIPPED | OUTPATIENT
Start: 2020-04-24 | End: 2020-07-28

## 2020-04-24 RX ORDER — METOPROLOL SUCCINATE 100 MG/1
TABLET, EXTENDED RELEASE ORAL
Qty: 90 TABLET | Refills: 0 | Status: SHIPPED | OUTPATIENT
Start: 2020-04-24 | End: 2020-07-28

## 2020-04-27 DIAGNOSIS — E03.9 HYPOTHYROIDISM, UNSPECIFIED TYPE: ICD-10-CM

## 2020-04-27 DIAGNOSIS — N40.0 BENIGN PROSTATIC HYPERPLASIA, UNSPECIFIED WHETHER LOWER URINARY TRACT SYMPTOMS PRESENT: ICD-10-CM

## 2020-04-28 RX ORDER — LEVOTHYROXINE SODIUM 200 UG/1
TABLET ORAL
Qty: 90 TABLET | Refills: 0 | Status: SHIPPED | OUTPATIENT
Start: 2020-04-28 | End: 2020-07-28

## 2020-04-28 RX ORDER — TAMSULOSIN HYDROCHLORIDE 0.4 MG/1
CAPSULE ORAL
Qty: 90 CAPSULE | Refills: 0 | Status: SHIPPED | OUTPATIENT
Start: 2020-04-28 | End: 2020-07-28

## 2020-07-27 DIAGNOSIS — D64.9 ANEMIA, UNSPECIFIED TYPE: ICD-10-CM

## 2020-07-27 DIAGNOSIS — E78.5 HYPERLIPIDEMIA, UNSPECIFIED HYPERLIPIDEMIA TYPE: ICD-10-CM

## 2020-07-27 DIAGNOSIS — I10 ESSENTIAL HYPERTENSION: ICD-10-CM

## 2020-07-27 DIAGNOSIS — N40.0 BENIGN PROSTATIC HYPERPLASIA, UNSPECIFIED WHETHER LOWER URINARY TRACT SYMPTOMS PRESENT: ICD-10-CM

## 2020-07-27 DIAGNOSIS — E03.9 HYPOTHYROIDISM, UNSPECIFIED TYPE: ICD-10-CM

## 2020-07-28 RX ORDER — ATORVASTATIN CALCIUM 80 MG/1
TABLET, FILM COATED ORAL
Qty: 90 TABLET | Refills: 5 | Status: ON HOLD | OUTPATIENT
Start: 2020-07-28 | End: 2020-12-14 | Stop reason: HOSPADM

## 2020-07-28 RX ORDER — LEVOTHYROXINE SODIUM 200 UG/1
TABLET ORAL
Qty: 90 TABLET | Refills: 5 | Status: SHIPPED | OUTPATIENT
Start: 2020-07-28 | End: 2021-10-19 | Stop reason: SDUPTHER

## 2020-07-28 RX ORDER — TAMSULOSIN HYDROCHLORIDE 0.4 MG/1
CAPSULE ORAL
Qty: 90 CAPSULE | Refills: 5 | Status: SHIPPED | OUTPATIENT
Start: 2020-07-28 | End: 2021-02-18 | Stop reason: SDUPTHER

## 2020-07-28 RX ORDER — METOPROLOL SUCCINATE 100 MG/1
TABLET, EXTENDED RELEASE ORAL
Qty: 90 TABLET | Refills: 5 | Status: SHIPPED | OUTPATIENT
Start: 2020-07-28 | End: 2021-10-19 | Stop reason: SDUPTHER

## 2020-07-28 RX ORDER — FERROUS SULFATE 325(65) MG
TABLET ORAL
Qty: 90 TABLET | Refills: 5 | Status: SHIPPED | OUTPATIENT
Start: 2020-07-28 | End: 2021-10-19 | Stop reason: SDUPTHER

## 2020-11-13 ENCOUNTER — PATIENT OUTREACH (OUTPATIENT)
Dept: ADMINISTRATIVE | Facility: HOSPITAL | Age: 80
End: 2020-11-13

## 2020-12-09 ENCOUNTER — TELEPHONE (OUTPATIENT)
Dept: PRIMARY CARE CLINIC | Facility: CLINIC | Age: 80
End: 2020-12-09

## 2020-12-09 ENCOUNTER — HOSPITAL ENCOUNTER (INPATIENT)
Facility: HOSPITAL | Age: 80
LOS: 5 days | Discharge: NURSING, REHAB OR LONG-TERM CARE | DRG: 064 | End: 2020-12-14
Attending: EMERGENCY MEDICINE | Admitting: PSYCHIATRY & NEUROLOGY
Payer: MEDICARE

## 2020-12-09 DIAGNOSIS — Z74.09 IMPAIRED MOBILITY AND ADLS: ICD-10-CM

## 2020-12-09 DIAGNOSIS — G93.6 VASOGENIC BRAIN EDEMA: ICD-10-CM

## 2020-12-09 DIAGNOSIS — I61.0 NONTRAUMATIC SUBCORTICAL HEMORRHAGE OF CEREBRAL HEMISPHERE, UNSPECIFIED LATERALITY: ICD-10-CM

## 2020-12-09 DIAGNOSIS — Z78.9 IMPAIRED MOBILITY AND ADLS: ICD-10-CM

## 2020-12-09 DIAGNOSIS — S06.360A TRAUMATIC HEMORRHAGE OF CEREBRUM WITHOUT LOSS OF CONSCIOUSNESS, UNSPECIFIED LATERALITY, INITIAL ENCOUNTER: Primary | ICD-10-CM

## 2020-12-09 DIAGNOSIS — I61.9 NONTRAUMATIC ACUTE HEMORRHAGE OF BASAL GANGLIA: ICD-10-CM

## 2020-12-09 DIAGNOSIS — I61.0 NONTRAUMATIC SUBCORTICAL HEMORRHAGE OF RIGHT CEREBRAL HEMISPHERE: ICD-10-CM

## 2020-12-09 DIAGNOSIS — I61.9 ICH (INTRACEREBRAL HEMORRHAGE): ICD-10-CM

## 2020-12-09 PROBLEM — I25.2 HISTORY OF MYOCARDIAL INFARCTION: Status: ACTIVE | Noted: 2020-12-09

## 2020-12-09 PROBLEM — E78.2 MIXED HYPERLIPIDEMIA: Status: ACTIVE | Noted: 2020-12-09

## 2020-12-09 PROBLEM — N40.0 BPH (BENIGN PROSTATIC HYPERPLASIA): Status: ACTIVE | Noted: 2020-12-09
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
## 2020-12-09 PROBLEM — I10 ESSENTIAL HYPERTENSION: Status: ACTIVE | Noted: 2020-12-09

## 2020-12-09 PROBLEM — S06.2XAA: Status: ACTIVE | Noted: 2020-12-09

## 2020-12-09 PROBLEM — D50.9 IRON DEFICIENCY ANEMIA: Status: ACTIVE | Noted: 2020-12-09

## 2020-12-09 PROBLEM — I25.10 CAD (CORONARY ARTERY DISEASE): Status: ACTIVE | Noted: 2020-12-09

## 2020-12-09 PROBLEM — E03.9 HYPOTHYROIDISM: Status: ACTIVE | Noted: 2020-12-09

## 2020-12-09 LAB
ABO + RH BLD: NORMAL
ALBUMIN SERPL BCP-MCNC: 3.7 G/DL (ref 3.5–5.2)
ALP SERPL-CCNC: 80 U/L (ref 55–135)
ALT SERPL W/O P-5'-P-CCNC: 28 U/L (ref 10–44)
ANION GAP SERPL CALC-SCNC: 11 MMOL/L (ref 8–16)
ASCENDING AORTA: 2.98 CM
AST SERPL-CCNC: 41 U/L (ref 10–40)
AV INDEX (PROSTH): 0.68
AV MEAN GRADIENT: 5 MMHG
AV PEAK GRADIENT: 9 MMHG
AV VALVE AREA: 2.12 CM2
AV VELOCITY RATIO: 0.72
BASOPHILS # BLD AUTO: 0.05 K/UL (ref 0–0.2)
BASOPHILS NFR BLD: 0.5 % (ref 0–1.9)
BILIRUB SERPL-MCNC: 2.8 MG/DL (ref 0.1–1)
BILIRUB UR QL STRIP: NEGATIVE
BLD GP AB SCN CELLS X3 SERPL QL: NORMAL
BSA FOR ECHO PROCEDURE: 2.07 M2
BUN SERPL-MCNC: 16 MG/DL (ref 8–23)
CALCIUM SERPL-MCNC: 9.1 MG/DL (ref 8.7–10.5)
CHLORIDE SERPL-SCNC: 105 MMOL/L (ref 95–110)
CHOLEST SERPL-MCNC: 71 MG/DL (ref 120–199)
CHOLEST/HDLC SERPL: 2.6 {RATIO} (ref 2–5)
CK MB SERPL-MCNC: 4.4 NG/ML (ref 0.1–6.5)
CK MB SERPL-RTO: 0.6 % (ref 0–5)
CK SERPL-CCNC: 710 U/L (ref 20–200)
CLARITY UR REFRACT.AUTO: CLEAR
CO2 SERPL-SCNC: 22 MMOL/L (ref 23–29)
COLOR UR AUTO: YELLOW
CREAT SERPL-MCNC: 1.2 MG/DL (ref 0.5–1.4)
CV ECHO LV RWT: 0.4 CM
DIFFERENTIAL METHOD: ABNORMAL
DOP CALC AO PEAK VEL: 1.51 M/S
DOP CALC AO VTI: 27.23 CM
DOP CALC LVOT AREA: 3.1 CM2
DOP CALC LVOT DIAMETER: 1.99 CM
DOP CALC LVOT PEAK VEL: 1.08 M/S
DOP CALC LVOT STROKE VOLUME: 57.85 CM3
DOP CALCLVOT PEAK VEL VTI: 18.61 CM
ECHO LV POSTERIOR WALL: 0.79 CM (ref 0.6–1.1)
EOSINOPHIL # BLD AUTO: 0.1 K/UL (ref 0–0.5)
EOSINOPHIL NFR BLD: 0.5 % (ref 0–8)
ERYTHROCYTE [DISTWIDTH] IN BLOOD BY AUTOMATED COUNT: 13.1 % (ref 11.5–14.5)
EST. GFR  (AFRICAN AMERICAN): >60 ML/MIN/1.73 M^2
EST. GFR  (NON AFRICAN AMERICAN): 56.4 ML/MIN/1.73 M^2
ESTIMATED AVG GLUCOSE: 103 MG/DL (ref 68–131)
FRACTIONAL SHORTENING: 27 % (ref 28–44)
GLUCOSE SERPL-MCNC: 116 MG/DL (ref 70–110)
GLUCOSE UR QL STRIP: NEGATIVE
HBA1C MFR BLD HPLC: 5.2 % (ref 4–5.6)
HCT VFR BLD AUTO: 40.8 % (ref 40–54)
HDLC SERPL-MCNC: 27 MG/DL (ref 40–75)
HDLC SERPL: 38 % (ref 20–50)
HGB BLD-MCNC: 13.7 G/DL (ref 14–18)
HGB UR QL STRIP: NEGATIVE
IMM GRANULOCYTES # BLD AUTO: 0.03 K/UL (ref 0–0.04)
IMM GRANULOCYTES NFR BLD AUTO: 0.3 % (ref 0–0.5)
INTERVENTRICULAR SEPTUM: 0.8 CM (ref 0.6–1.1)
KETONES UR QL STRIP: ABNORMAL
LA MAJOR: 6.04 CM
LA MINOR: 5.65 CM
LA WIDTH: 3.93 CM
LDLC SERPL CALC-MCNC: 31.6 MG/DL (ref 63–159)
LEFT ATRIUM SIZE: 2.4 CM
LEFT ATRIUM VOLUME INDEX: 23.3 ML/M2
LEFT ATRIUM VOLUME: 46.81 CM3
LEFT INTERNAL DIMENSION IN SYSTOLE: 2.88 CM (ref 2.1–4)
LEFT VENTRICLE DIASTOLIC VOLUME INDEX: 33.46 ML/M2
LEFT VENTRICLE DIASTOLIC VOLUME: 67.16 ML
LEFT VENTRICLE MASS INDEX: 45 G/M2
LEFT VENTRICLE SYSTOLIC VOLUME INDEX: 15.8 ML/M2
LEFT VENTRICLE SYSTOLIC VOLUME: 31.63 ML
LEFT VENTRICULAR INTERNAL DIMENSION IN DIASTOLE: 3.93 CM (ref 3.5–6)
LEFT VENTRICULAR MASS: 90.04 G
LEUKOCYTE ESTERASE UR QL STRIP: NEGATIVE
LYMPHOCYTES # BLD AUTO: 1.4 K/UL (ref 1–4.8)
LYMPHOCYTES NFR BLD: 12.5 % (ref 18–48)
MAGNESIUM SERPL-MCNC: 1.5 MG/DL (ref 1.6–2.6)
MCH RBC QN AUTO: 32.1 PG (ref 27–31)
MCHC RBC AUTO-ENTMCNC: 33.6 G/DL (ref 32–36)
MCV RBC AUTO: 96 FL (ref 82–98)
MONOCYTES # BLD AUTO: 0.9 K/UL (ref 0.3–1)
MONOCYTES NFR BLD: 8.4 % (ref 4–15)
NEUTROPHILS # BLD AUTO: 8.4 K/UL (ref 1.8–7.7)
NEUTROPHILS NFR BLD: 77.8 % (ref 38–73)
NITRITE UR QL STRIP: NEGATIVE
NONHDLC SERPL-MCNC: 44 MG/DL
NRBC BLD-RTO: 0 /100 WBC
PH UR STRIP: 5 [PH] (ref 5–8)
PHOSPHATE SERPL-MCNC: 3.4 MG/DL (ref 2.7–4.5)
PISA TR MAX VEL: 2.53 M/S
PLATELET # BLD AUTO: 109 K/UL (ref 150–350)
PMV BLD AUTO: 10.7 FL (ref 9.2–12.9)
POTASSIUM SERPL-SCNC: 4 MMOL/L (ref 3.5–5.1)
PROT SERPL-MCNC: 6.2 G/DL (ref 6–8.4)
PROT UR QL STRIP: NEGATIVE
RA MAJOR: 4.64 CM
RA PRESSURE: 3 MMHG
RA WIDTH: 3.58 CM
RBC # BLD AUTO: 4.27 M/UL (ref 4.6–6.2)
RIGHT VENTRICULAR END-DIASTOLIC DIMENSION: 3.15 CM
SINUS: 2.85 CM
SODIUM SERPL-SCNC: 138 MMOL/L (ref 136–145)
SP GR UR STRIP: >=1.03 (ref 1–1.03)
STJ: 2.88 CM
T4 FREE SERPL-MCNC: 1.13 NG/DL (ref 0.71–1.51)
TDI LATERAL: 0.06 M/S
TDI SEPTAL: 0.06 M/S
TDI: 0.06 M/S
TR MAX PG: 26 MMHG
TRICUSPID ANNULAR PLANE SYSTOLIC EXCURSION: 2.93 CM
TRIGL SERPL-MCNC: 62 MG/DL (ref 30–150)
TSH SERPL DL<=0.005 MIU/L-ACNC: 0.18 UIU/ML (ref 0.4–4)
TV REST PULMONARY ARTERY PRESSURE: 29 MMHG
URN SPEC COLLECT METH UR: ABNORMAL
WBC # BLD AUTO: 10.77 K/UL (ref 3.9–12.7)

## 2020-12-09 PROCEDURE — 25000003 PHARM REV CODE 250: Performed by: PHYSICIAN ASSISTANT

## 2020-12-09 PROCEDURE — 83735 ASSAY OF MAGNESIUM: CPT

## 2020-12-09 PROCEDURE — 97166 OT EVAL MOD COMPLEX 45 MIN: CPT

## 2020-12-09 PROCEDURE — 99291 CRITICAL CARE FIRST HOUR: CPT | Mod: ,,, | Performed by: PHYSICIAN ASSISTANT

## 2020-12-09 PROCEDURE — 25500020 PHARM REV CODE 255: Performed by: PSYCHIATRY & NEUROLOGY

## 2020-12-09 PROCEDURE — 99291 PR CRITICAL CARE, E/M 30-74 MINUTES: ICD-10-PCS | Mod: ,,, | Performed by: PHYSICIAN ASSISTANT

## 2020-12-09 PROCEDURE — 86901 BLOOD TYPING SEROLOGIC RH(D): CPT

## 2020-12-09 PROCEDURE — 81003 URINALYSIS AUTO W/O SCOPE: CPT

## 2020-12-09 PROCEDURE — 84443 ASSAY THYROID STIM HORMONE: CPT

## 2020-12-09 PROCEDURE — 80053 COMPREHEN METABOLIC PANEL: CPT

## 2020-12-09 PROCEDURE — 20000000 HC ICU ROOM

## 2020-12-09 PROCEDURE — 82550 ASSAY OF CK (CPK): CPT

## 2020-12-09 PROCEDURE — 82553 CREATINE MB FRACTION: CPT

## 2020-12-09 PROCEDURE — 97116 GAIT TRAINING THERAPY: CPT

## 2020-12-09 PROCEDURE — 84439 ASSAY OF FREE THYROXINE: CPT

## 2020-12-09 PROCEDURE — 92610 EVALUATE SWALLOWING FUNCTION: CPT

## 2020-12-09 PROCEDURE — 97530 THERAPEUTIC ACTIVITIES: CPT

## 2020-12-09 PROCEDURE — 84100 ASSAY OF PHOSPHORUS: CPT

## 2020-12-09 PROCEDURE — 99223 PR INITIAL HOSPITAL CARE,LEVL III: ICD-10-PCS | Mod: ,,, | Performed by: PSYCHIATRY & NEUROLOGY

## 2020-12-09 PROCEDURE — 83036 HEMOGLOBIN GLYCOSYLATED A1C: CPT

## 2020-12-09 PROCEDURE — 99223 1ST HOSP IP/OBS HIGH 75: CPT | Mod: ,,, | Performed by: PSYCHIATRY & NEUROLOGY

## 2020-12-09 PROCEDURE — 97162 PT EVAL MOD COMPLEX 30 MIN: CPT

## 2020-12-09 PROCEDURE — 99291 CRITICAL CARE FIRST HOUR: CPT | Mod: 25

## 2020-12-09 PROCEDURE — 80061 LIPID PANEL: CPT

## 2020-12-09 PROCEDURE — 85025 COMPLETE CBC W/AUTO DIFF WBC: CPT

## 2020-12-09 RX ORDER — AMOXICILLIN 250 MG
1 CAPSULE ORAL 2 TIMES DAILY
Status: DISCONTINUED | OUTPATIENT
Start: 2020-12-09 | End: 2020-12-14

## 2020-12-09 RX ORDER — ACETAMINOPHEN 325 MG/1
650 TABLET ORAL EVERY 6 HOURS PRN
Status: DISCONTINUED | OUTPATIENT
Start: 2020-12-09 | End: 2020-12-14 | Stop reason: HOSPADM

## 2020-12-09 RX ORDER — ATORVASTATIN CALCIUM 20 MG/1
40 TABLET, FILM COATED ORAL DAILY
Status: DISCONTINUED | OUTPATIENT
Start: 2020-12-09 | End: 2020-12-11

## 2020-12-09 RX ORDER — LABETALOL HCL 20 MG/4 ML
10 SYRINGE (ML) INTRAVENOUS EVERY 4 HOURS PRN
Status: DISCONTINUED | OUTPATIENT
Start: 2020-12-09 | End: 2020-12-14 | Stop reason: HOSPADM

## 2020-12-09 RX ORDER — ONDANSETRON 2 MG/ML
4 INJECTION INTRAMUSCULAR; INTRAVENOUS EVERY 8 HOURS PRN
Status: DISCONTINUED | OUTPATIENT
Start: 2020-12-09 | End: 2020-12-14 | Stop reason: HOSPADM

## 2020-12-09 RX ORDER — SODIUM CHLORIDE 0.9 % (FLUSH) 0.9 %
10 SYRINGE (ML) INJECTION
Status: DISCONTINUED | OUTPATIENT
Start: 2020-12-09 | End: 2020-12-14 | Stop reason: HOSPADM

## 2020-12-09 RX ORDER — ACETAMINOPHEN 325 MG/1
650 TABLET ORAL EVERY 6 HOURS PRN
Status: DISCONTINUED | OUTPATIENT
Start: 2020-12-09 | End: 2020-12-09

## 2020-12-09 RX ORDER — LEVOTHYROXINE SODIUM 100 UG/1
200 TABLET ORAL
Status: DISCONTINUED | OUTPATIENT
Start: 2020-12-09 | End: 2020-12-14 | Stop reason: HOSPADM

## 2020-12-09 RX ORDER — OXYCODONE HYDROCHLORIDE 5 MG/1
5 TABLET ORAL EVERY 6 HOURS PRN
Status: DISCONTINUED | OUTPATIENT
Start: 2020-12-09 | End: 2020-12-14 | Stop reason: HOSPADM

## 2020-12-09 RX ORDER — METOPROLOL TARTRATE 25 MG/1
25 TABLET, FILM COATED ORAL 3 TIMES DAILY
Status: DISCONTINUED | OUTPATIENT
Start: 2020-12-09 | End: 2020-12-11

## 2020-12-09 RX ADMIN — ATORVASTATIN CALCIUM 40 MG: 20 TABLET, FILM COATED ORAL at 10:12

## 2020-12-09 RX ADMIN — LEVOTHYROXINE SODIUM 200 MCG: 100 TABLET ORAL at 06:12

## 2020-12-09 RX ADMIN — METOPROLOL TARTRATE 25 MG: 25 TABLET, FILM COATED ORAL at 04:12

## 2020-12-09 RX ADMIN — DOCUSATE SODIUM 50MG AND SENNOSIDES 8.6MG 1 TABLET: 8.6; 5 TABLET, FILM COATED ORAL at 10:12

## 2020-12-09 RX ADMIN — LABETALOL HCL IV SOLN PREFILLED SYRINGE 20 MG/4ML (5 MG/ML) 10 MG: 20/4 SOLUTION PREFILLED SYRINGE at 10:12

## 2020-12-09 RX ADMIN — ACETAMINOPHEN 650 MG: 325 TABLET ORAL at 10:12

## 2020-12-09 RX ADMIN — METOPROLOL TARTRATE 25 MG: 25 TABLET, FILM COATED ORAL at 09:12

## 2020-12-09 RX ADMIN — IOHEXOL 75 ML: 350 INJECTION, SOLUTION INTRAVENOUS at 04:12

## 2020-12-09 NOTE — PT/OT/SLP EVAL
Occupational Therapy   Evaluation and Treatment    Name: Hever Reyes Jr.  MRN: [id number]  Admitting Diagnosis:  Nontraumatic acute hemorrhage of basal ganglia   Length of Stay: 0 days  Recent Surgery: * No surgery found *      Recommendations:   Discharge Recommendations: rehabilitation facility  Discharge Equipment Recommendations:  (TBD with progression in care)  Barriers to discharge:  None    Assessment:   Hever Reyes Jr. is a 80 y.o. male with a medical diagnosis of Nontraumatic acute hemorrhage of basal ganglia.  He presents with L hemiparesis and coordination deficits affecting his functional indep with ADLs/self care and mobility from his reported indep baseline. Despite patient's co-morbidities,patient was living in community setting functioning at independent level for ADLs, and mobility prior to this event.  There is an expectation of returning to prior level of function to maintain independence thus avoiding readmission.  Patient's clinical condition meets full Inpatient Rehab (IPR) criteria; including the ability to actively participate in 3 hours of therapy.  A lower level of care(SNF) cannot provide the interdisciplinary treatment approach needed.     OT to cont to follow up in acute setting to ensure best d/c planning needs.  Performance deficits affecting function: weakness, impaired endurance, impaired sensation, impaired self care skills, impaired functional mobilty, gait instability, decreased upper extremity function, decreased lower extremity function, impaired balance, visual deficits, decreased safety awareness, edema, impaired skin.      Pt is safe to stand pivot assist x1 to bedside commode for toileting task.     Rehab Prognosis: Good; patient would benefit from acute skilled OT services to address these deficits and reach maximum level of function.       Plan:     Patient to be seen 4 x/week to address the above listed problems via self-care/home management, therapeutic activities,  therapeutic exercises, neuromuscular re-education  · Plan of Care Expires: 01/08/21  · Plan of Care Reviewed with: patient    Subjective     Chief Complaint: none reported   Patient/Family Comments/goals: home    Occupational Profile:  Living Environment:  Pt lives with son, daughter in law and 3 grandchildren (ages 14, 13, 10). SSH with 0 JUNIOR. Tub/shower combo.  Previous level of function: indep with ADLs/self care/mobility. Reported 0 other falls this year, using no DME.  Roles and Routines: father, grand father, care taker to self, home and community dweller; no longer driving. Retired    Equipment Used at Home:  none (reported they own a rolling walker)  Assistance upon Discharge: limited 24 hour (reported son works)    Pain/Comfort:  · Pain Rating 1: 0/10  · Pain Rating Post-Intervention 1: 0/10    Patients cultural, spiritual, Taoist conflicts given the current situation: no    Objective:   Communicated with: RN prior to session.    Patient found HOB elevated with blood pressure cuff, telemetry, pulse ox (continuous), peripheral IV upon OT entry to room.    General Precautions: Standard, aspiration, fall, seizure, vision impaired   Orthopedic Precautions:N/A   Braces: N/A     Occupational Performance:  Bed Mobility:    · Patient completed Rolling/Turning to Right with minimum assistance  · Patient completed Supine to Sit with minimum assistance  · Patient completed Sit to Supine with moderate assistance    Functional Mobility/Transfers:  · Patient completed Sit <> Stand Transfer with moderate assistance  with  hand-held assist   · Patient completed Step Transfer technique with moderate assistance with no assistive device  · Functional Mobility: mod(A) for side steps at EOB   · Increased weakness for LLE with task    Activities of Daily Living:  · Feeding:  contact guard assistance for cup holding with R hand; noted spillage from L corner of mouth-- RN aware  · Grooming: unable to complete in  standing 2/2 dynamic balance impairement; set up and min(A) EOB-- assist for LUE    · Upper Body Dressing: moderate assistance don and doff gown jacket EOB  · Lower Body Dressing: maximal assistance increased assistance LLE    Cognitive/Visual Perceptual:  Cognitive/Psychosocial Skills:     -       Oriented to: Person and Situation   -       Follows Commands/attention:Follows two-step commands  -       Communication: dysarthria  -       Memory: Poor immediate recall  -       Safety awareness/insight to disability: intact   -       Mood/Affect/Coping skills/emotional control: Cooperative  Visual/Perceptual:      -Impaired  ocular motor control L eye      Physical Exam:  Postural examination/scapula alignment:    -       Rounded shoulders  -       Forward head  -       Posterior pelvic tilt  Skin integrity: Abrasion of L knee and L UE  Edema:  None noted  Sensation:    -       Intact  light/touch B UE  Motor Planning:    -       Impaired; mild drift L  Dominant hand:    -       R  Upper Extremity Range of Motion:     -       Right Upper Extremity: WFL  -       Left Upper Extremity: WFL  Upper Extremity Strength:    -       Right Upper Extremity: WFL  -       Left Upper Extremity: 4+/5 shoulder flex   Strength:    -       Right Upper Extremity: WFL  -       Left Upper Extremity: WFL  Fine Motor Coordination:    -       Intact  Left hand, finger to nose, Left hand thumb/finger opposition skills and Left hand, manipulation of objects  Gross motor coordination:   L hemiplegia/paresis    AMPAC 6 Click ADL:  AMPAC Total Score: 17  Body mass index is 34.45 kg/m².  Vitals:    12/09/20 1401   BP: 135/68   Pulse: 86   Resp: 20   Temp:        Treatment & Education:  -Pt alert and agreeable to therapy session; edu on OT role in care  -verbal daily orientation provided   -EOB/stretcher with CGA for postural control  -static standing with mod(A) and B hand held assistance  -edu on call light for staff assistance and need for  staff supervision/assistance for OOB activity   -Communication board updated; questions/concerns addressed within OT scope of practice      Patient left HOB elevated with all lines intact, call button in reach and RN notified    GOALS:   Multidisciplinary Problems     Occupational Therapy Goals        Problem: Occupational Therapy Goal    Goal Priority Disciplines Outcome Interventions   Occupational Therapy Goal     OT, PT/OT Ongoing, Progressing    Description: Goals to be met by: 12/23     Patient will increase functional independence with ADLs by performing:    UE Dressing with Contact Guard Assistance.  LE Dressing (pants, brief) with Contact Guard Assistance.  Grooming while standing at sink with Contact Guard Assistance.  Toileting from toilet with Minimal Assistance for hygiene and clothing management.   Supine to sit with Contact Guard Assistance.  Toilet transfer to toilet with Contact Guard Assistance.  Functional mobility at household distance for ADL task with min(A).   Pt will verbalize s/s of stroke with teachback.                    History:     Past Medical History:   Diagnosis Date    Anemia     BPH (benign prostatic hyperplasia)     Coronary artery disease     GERD (gastroesophageal reflux disease)     HLD (hyperlipidemia)     Hypertension     Hyperthyroidism     Nephrolithiasis     Osteoarthritis     Sciatica     Sinusitis        Past Surgical History:   Procedure Laterality Date    CATARACT EXTRACTION      CORONARY ANGIOPLASTY WITH STENT PLACEMENT      LAPAROSCOPIC CHOLECYSTECTOMY N/A 5/6/2019    Procedure: CHOLECYSTECTOMY, LAPAROSCOPIC;  Surgeon: Liam Ordonez MD;  Location: [facility];  Service: General;  Laterality: N/A;  Aminah video confirmed 5/3/dme    PROSTATE BIOPSY         Time Tracking:     OT Date of Treatment: 12/09/20  OT Start Time: 1044  OT Stop Time: 1102  OT Total Time (min): 18 min    Billable Minutes:Evaluation 10  Self Care/Home Management George JOY  Jadon, LOTR 12/9/2020

## 2020-12-09 NOTE — ASSESSMENT & PLAN NOTE
-Stroke risk factor.  The patient is prescribed atorvastatin 80 mg daily.  -LDL 31.6.  -Recommend atorvastatin 20 mg daily

## 2020-12-09 NOTE — ASSESSMENT & PLAN NOTE
Pt is 80yom w pmh CAD, HTN, hyperthyroid, HLD who presents with multipke falls and recent fall thi morning found down after getting out of bed where he lost balance and strength in his legs, denies LOC. Pt AOx4 and states he feelse generalized weakness. CTH showed acute R BG hemorhhage 2.5cm in size. NSGY consulted for evaluation.     Plan:  Admit NCC  Q1hr neurochecks  CBC, CMP, coags, type & screen  HOB>30  SBP<140  Na 135-150  Keppra  NPO  Repeat CTH in 6hrs with CTA

## 2020-12-09 NOTE — ED NOTES
Pt resting comfortably and independently repositioned in stretcher with bed locked in lowest position for safety. NAD noted at this time. Respirations even and unlabored and visible chest rise noted.  Patient offered bathroom assistance and denies need at this time. Pt instructed to call if assistance is needed. Condom cath present to bedside drainage. Pt on continuous cardiac, BP, and O2 monitoring. Call light within reach. No needs at this time. Will continue to monitor.

## 2020-12-09 NOTE — ASSESSMENT & PLAN NOTE
2.5cm R BG IPH with mild mass effect, 6mm right to left shift, and effacement of the right lateral ventricle on CTH s/p fall    -on ASA 81 daily and fish oil 1000mg at home, hold in acute setting  -coags WNL on admit  -NSGY consulted, awaiting reccs  -repeat CTH pending  -avoid AC/AP in setting of bleed  -SBP <160  -PT/OT/SLP  -q1hr neuro checks

## 2020-12-09 NOTE — H&P
"[facility]  Neurocritical Care  History & Physical    Admit Date: 12/9/2020  Service Date: 12/09/2020  Length of Stay: 0    Subjective:     Chief Complaint: Traumatic hemorrhage of basal ganglia    History of Present Illness: 81 yo M presents with 2.5cm R BG IPH with mild mass effect, 6mm right to left shift, and effacement of the right lateral ventricle s/p fall. Pt and bedside family report he fell this morning and was found down 2 hours after falling. Pt describes his fall as "I think my leg gave out. I was getting up from the toilet and walking back to another room and then I was just on the floor and couldn't get myself up." He denies LOC, dizziness, or syncope, and his home meds include ASA 81mg daily and fish oil 1000mg, without AC use. Coags in ED WNL. PMH includes: anemia, BPH, CAD/MI 5 years ago, GERD, hyperlipidemia, hypertension, hyperthyroidism, kidney stones, osteoarthritis, sciatica, and sinusitis. Pt is being admitted to [facility] for monitoring and management of R BG IPH.    Past Medical History:   Diagnosis Date    Anemia     BPH (benign prostatic hyperplasia)     Coronary artery disease     GERD (gastroesophageal reflux disease)     HLD (hyperlipidemia)     Hypertension     Hyperthyroidism     Nephrolithiasis     Osteoarthritis     Sciatica     Sinusitis      Past Surgical History:   Procedure Laterality Date    CATARACT EXTRACTION      CORONARY ANGIOPLASTY WITH STENT PLACEMENT      LAPAROSCOPIC CHOLECYSTECTOMY N/A 5/6/2019    Procedure: CHOLECYSTECTOMY, LAPAROSCOPIC;  Surgeon: Liam Ordonez MD;  Location: [facility];  Service: General;  Laterality: N/A;  Aminah video confirmed 5/3/dme    PROSTATE BIOPSY        Current Facility-Administered Medications on File Prior to Encounter   Medication Dose Route Frequency Provider Last Rate Last Dose    [COMPLETED] ondansetron injection 4 mg  4 mg Intravenous ED 1 Time Sheng Prater III, MD   4 mg at 12/08/20 " 214     Current Outpatient Medications on File Prior to Encounter   Medication Sig Dispense Refill    aspirin (ECOTRIN) 81 MG EC tablet Take 81 mg by mouth once daily.      atorvastatin (LIPITOR) 80 MG tablet Take 1 tablet by mouth once daily 90 tablet 5    famotidine (PEPCID) 20 MG tablet Take 1 tablet (20 mg total) by mouth once daily. 90 tablet 3    ferrous sulfate (FEOSOL) 325 mg (65 mg iron) Tab tablet Take 1 tablet by mouth twice daily 90 tablet 5    fish oil-omega-3 fatty acids 300-1,000 mg capsule Take by mouth once daily.      levothyroxine (SYNTHROID) 200 MCG tablet Take 1 tablet by mouth once daily 90 tablet 5    metoprolol succinate (TOPROL-XL) 100 MG 24 hr tablet Take 1 tablet by mouth once daily 90 tablet 5    multivitamin capsule Take 1 capsule by mouth once daily.      tamsulosin (FLOMAX) 0.4 mg Cap Take 1 capsule by mouth once daily 90 capsule 5      Allergies: Patient has no known allergies.    Family History   Family history unknown: Yes     Social History     Tobacco Use    Smoking status: Never Smoker    Smokeless tobacco: Never Used   Substance Use Topics    Alcohol use: No    Drug use: No     Review of Systems   Constitutional: Negative for activity change, chills and fever.   HENT: Positive for sneezing. Negative for congestion, rhinorrhea and trouble swallowing.    Eyes: Negative for pain and redness.        Baseline poor eye sight, unchanged post fall   Respiratory: Negative for chest tightness, shortness of breath and wheezing.    Cardiovascular: Negative for chest pain and palpitations.   Gastrointestinal: Negative for abdominal pain, constipation, diarrhea, nausea and vomiting.   Endocrine: Negative for polyuria.   Genitourinary: Negative for difficulty urinating, dysuria and urgency.   Musculoskeletal: Positive for arthralgias. Negative for neck pain and neck stiffness.        Rib pain from fall   Skin: Negative for rash.   Neurological: Positive for weakness and  headaches. Negative for dizziness, tremors, seizures, syncope, speech difficulty, light-headedness and numbness.        Feels weak, couldn't get himself up after falling this AM   Psychiatric/Behavioral: Negative for agitation, behavioral problems and confusion.     Objective:     Vitals:    Temp: 98 °F (36.7 °C)  Pulse: 99  BP: (!) 151/72  MAP (mmHg): 102  Resp: 17  SpO2: 98 %  O2 Device (Oxygen Therapy): nasal cannula    Temp  Min: 97.9 °F (36.6 °C)  Max: 98 °F (36.7 °C)  Pulse  Min: 72  Max: 103  BP  Min: 120/63  Max: 167/94  MAP (mmHg)  Min: 80  Max: 102  Resp  Min: 16  Max: 18  SpO2  Min: 92 %  Max: 100 %    No intake/output data recorded.           Physical Exam  Constitutional:       General: He is not in acute distress.     Appearance: He is obese.   HENT:      Head: Normocephalic.      Right Ear: External ear normal.      Left Ear: External ear normal.      Nose: Nose normal. No rhinorrhea.      Mouth/Throat:      Mouth: Mucous membranes are moist.      Pharynx: Oropharynx is clear.   Eyes:      General:         Right eye: No discharge.         Left eye: No discharge.      Conjunctiva/sclera: Conjunctivae normal.      Comments: Right coloboma baseline, Left eye leftward gaze preference baseline   Neck:      Musculoskeletal: Neck supple. No neck rigidity.   Cardiovascular:      Rate and Rhythm: Normal rate and regular rhythm.      Pulses: Normal pulses.   Pulmonary:      Effort: Pulmonary effort is normal. No respiratory distress.      Breath sounds: No stridor. No wheezing or rhonchi.   Abdominal:      General: There is no distension.      Palpations: Abdomen is soft. There is no mass.      Tenderness: There is no abdominal tenderness.      Hernia: No hernia is present.   Musculoskeletal:         General: Signs of injury present. No deformity.      Right lower leg: No edema.      Left lower leg: No edema.   Skin:     General: Skin is warm and dry.      Findings: No rash.   Neurological:      Mental Status:  He is alert.      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         Behavior: Behavior normal.       Unable to test coordination, gait due to level of consciousness.    Today I personally reviewed pertinent medications, lines/drains/airways, imaging, cardiology results, laboratory results, microbiology results, notably:  CTH, coags    Assessment/Plan:     Cardiac/Vascular  History of myocardial infarction  - hx of MI 5 years ago  -CAD  - continue cardiac monitoring  -cardiac enzymes WNL on admit  -hold home ASA/fish oil    CAD (coronary artery disease)  -hold AS and fish oil in acute setting    Essential hypertension  SBP <160, labetalol prn    -takes metoprolol ER 100mg at home, hold ER in acute setting  -convert to metoprolol IR 50 BID    Neuro  Vasogenic brain edema  -see IPH  - q1hr neuro checks  -repeat imaging if exam change    * hemorrhage of basal ganglia  2.5cm R BG IPH with mild mass effect, 6mm right to left shift, and effacement of the right lateral ventricle on CTH s/p fall    -on ASA 81 daily and fish oil 1000mg at home, hold in acute setting  -coags WNL on admit  -NSGY and VN consulted, awaiting reccs  -follow up CTA pending, abnormal location for traumatic bleed  -avoid AC/AP in setting of acute bleed  -admit to neuro ICU  -SBP <160  -q1hr neuro checks  -PT/OT/SLP      Renal/  BPH (benign prostatic hyperplasia)  Continue home tamsulosin      Oncology  Iron deficiency anemia  History of, takes iron 65 mg at home  -daily CBC    Endocrine  Hypothyroidism  - continue home levothyroxine    Orthopedic  Fall due to stumbling  Pt states his leg gave  out when walking  - PT/OT  -CXR pending for rib eval        The patient is being Prophylaxed for:  Venous Thromboembolism with: Mechanical  Stress Ulcer with: Not Applicable    Ventilator Pneumonia with: not applicable    Activity Orders          Diet NPO: NPO starting at 12/09 0159        Full Code     Critical care time: 35 min due to high possibility of decline in patient's status.    Keira Kim PA-C  Neurocritical Care  [facility]

## 2020-12-09 NOTE — ED NOTES
Pt sleeping comfortably and independently repositioned in stretcher with bed locked in lowest position for safety. NAD noted at this time. Respirations even and unlabored and visible chest rise noted.  Patient offered bathroom assistance and denies need at this time. Pt instructed to call if assistance is needed. Pt on continuous cardiac, BP, and O2 monitoring. Call light within reach. Son at bedside. No needs at this time. Will continue to monitor.

## 2020-12-09 NOTE — ED NOTES
LOC: The patient is awake, alert, and oriented to place and time but not situation. Affect is appropriate.  Speech is appropriate and clear.     APPEARANCE: Patient resting comfortably in no acute distress.  Patient cleaned with clean hospital gown applied.    SKIN: The skin is warm and dry; color consistent with ethnicity.  Patient has normal skin turgor and moist mucus membranes.  Skin intact; no breakdown or bruising noted. Pt has rash to left back and redness to right elbow; denies pain or itching; pt unsure of origin or onset of rash and redness.     MUSCULOSKELETAL: Patient moving upper and lower extremities without difficulty.  Generalized weakness.     RESPIRATORY: Airway is open and patent. Respirations spontaneous, even, easy, and non-labored.  Patient has a normal effort and rate.  No accessory muscle use noted. Denies cough and SOB.     CARDIAC:  Normal rhythm and rate noted.  No peripheral edema noted. No complaints of chest pain.     ABDOMEN: Soft and non tender to palpation.  No distention noted. Denies n/v/d and abd pain.    : Pt had episode of urinary incontinence. Pt's soiled clothing removed. Pt cleaned and linens changed. Condom cath applied to gravity drainage.     NEUROLOGIC: Eyes open spontaneously.  Behavior appropriate to situation.  Follows commands; facial expression symmetrical.  Purposeful motor response noted; normal sensation in all extremities. Denies numbness and tingling.    Pt instructed to call if assistance is needed. Pt on continuous cardiac, BP, and O2 monitoring. Call light within reach. Updated on POC. No needs at this time. Will continue to monitor.

## 2020-12-09 NOTE — ASSESSMENT & PLAN NOTE
-Moderate area of vasogenic cerebral edema identified when reviewing brain imaging in the territory of the R middle cerebral artery. There is mass effect associated with it. We will continue to monitor the patients clinical exam for any worsening of symptoms which may indicate expansion of the stroke or the area of the edema resulting in the clinical change. The pattern is suggestive of hypertensive vs brain mass etiology.  -Neurosurgery following  -Repeat brain imaging pending

## 2020-12-09 NOTE — ASSESSMENT & PLAN NOTE
Hever Reyes Jr. is a 80 y.o. male with a significant medical history of BPH, GERD, HLD, HTN, OA, CAD, and hypothyroidism who presents to the hospital as a transfer from [facility] for evaluation of ICH.  He states his leg gave out causing him to fall and he was unable to get up without assistance.  A CTH revealed a right basal ganglia hemorrhage.    Antithrombotics for secondary stroke prevention: Antiplatelets: None: Intracerebral Hemorrhage    Statins for secondary stroke prevention and hyperlipidemia, if present:   Statins: Atorvastatin- 20 mg daily    Aggressive risk factor modification: HTN, HLD, CAD     Rehab efforts: The patient has been evaluated by a stroke team provider and the therapy needs have been fully considered based off the presenting complaints and exam findings. The following therapy evaluations are needed: PT evaluate and treat, OT evaluate and treat, SLP evaluate and treat, PM&R evaluate for appropriate placement    Diagnostics ordered/pending: MRI head w/ without contrast to assess brain parenchyma, TTE to assess cardiac function/status     VTE prophylaxis: Mechanical prophylaxis: Place SCDs  None: Reason for No Pharmacological VTE Prophylaxis: History of systemic or intracranial bleeding    BP parameters: ICH: SBP <140

## 2020-12-09 NOTE — ED TRIAGE NOTES
80 year old male transfer for Neurosurgery after being found to have ICH at OSH. Alert and oriented. No deficits.

## 2020-12-09 NOTE — PLAN OF CARE
OT eval completed; rec rehab pending progression in care. Aixa RUFINO ROULA Diop 12/9/2020   Problem: Occupational Therapy Goal  Goal: Occupational Therapy Goal  Description: Goals to be met by: 12/23     Patient will increase functional independence with ADLs by performing:    UE Dressing with Contact Guard Assistance.  LE Dressing (pants, brief) with Contact Guard Assistance.  Grooming while standing at sink with Contact Guard Assistance.  Toileting from toilet with Minimal Assistance for hygiene and clothing management.   Supine to sit with Contact Guard Assistance.  Toilet transfer to toilet with Contact Guard Assistance.  Functional mobility at household distance for ADL task with min(A).   Pt will verbalize s/s of stroke with teachback.   Outcome: Ongoing, Progressing

## 2020-12-09 NOTE — CONSULTS
[facility]  Neurosurgery  Consult Note    Consults  Subjective:     Chief Complaint/Reason for Admission: multiple falls and weaknsess    History of Present Illness: Pt is 80yom w pmh CAD, HTN, hyperthyroid, HLD who presents with multipke falls and recent fall thi morning found down after getting out of bed where he lost balance and strength in his legs, denies LOC. Pt AOx4 and states he feelse generalized weakness. CTH showed acute R BG hemorhhage 2.5cm in size. NSGY consulted for evaluation.        (Not in a hospital admission)      Review of patient's allergies indicates:  No Known Allergies    Past Medical History:   Diagnosis Date    Anemia     BPH (benign prostatic hyperplasia)     Coronary artery disease     GERD (gastroesophageal reflux disease)     HLD (hyperlipidemia)     Hypertension     Hyperthyroidism     Nephrolithiasis     Osteoarthritis     Sciatica     Sinusitis      Past Surgical History:   Procedure Laterality Date    CATARACT EXTRACTION      CORONARY ANGIOPLASTY WITH STENT PLACEMENT      LAPAROSCOPIC CHOLECYSTECTOMY N/A 5/6/2019    Procedure: CHOLECYSTECTOMY, LAPAROSCOPIC;  Surgeon: Liam Ordonez MD;  Location: [facility];  Service: General;  Laterality: N/A;  Aminah video confirmed 5/3/dme    PROSTATE BIOPSY       Family History     Family history is unknown by patient.        Tobacco Use    Smoking status: Never Smoker    Smokeless tobacco: Never Used   Substance and Sexual Activity    Alcohol use: No    Drug use: No    Sexual activity: Not on file     Review of Systems   Constitutional: Negative for activity change and fatigue.   Eyes: Negative for photophobia and visual disturbance.   Respiratory: Positive for chest tightness. Negative for shortness of breath.    Cardiovascular: Positive for chest pain.   Gastrointestinal: Negative for nausea and vomiting.   Musculoskeletal: Negative for back pain, neck pain and neck stiffness.   Neurological:  Positive for dizziness, weakness and headaches. Negative for speech difficulty, light-headedness and numbness.     Objective:     Weight: 93.9 kg (207 lb)  Body mass index is 34.45 kg/m².  Vital Signs (Most Recent):  Temp: 98 °F (36.7 °C) (12/09/20 0109)  Pulse: 97 (12/09/20 0300)  Resp: 20 (12/09/20 0300)  BP: 132/71 (12/09/20 0300)  SpO2: 96 % (12/09/20 0300) Vital Signs (24h Range):  Temp:  [97.9 °F (36.6 °C)-98 °F (36.7 °C)] 98 °F (36.7 °C)  Pulse:  [] 97  Resp:  [16-20] 20  SpO2:  [92 %-100 %] 96 %  BP: (120-167)/(49-94) 132/71                          Physical Exam:  Nursing note and vitals reviewed.    Constitutional: He appears well-developed and well-nourished.     Cardiovascular: Normal rate and regular rhythm.     Abdominal: Soft.     Skin: Skin displays no lesions on extremities.     Psych/Behavior: He is alert. He is oriented to person, place, and time.     Musculoskeletal:        Right Upper Extremities: Range of motion is full. Tone is normal.        Left Upper Extremities: Range of motion is full. Tone is normal.       Right Lower Extremities: Range of motion is full. Tone is normal.        Left Lower Extremities: Range of motion is full. Tone is normal.     Neurological:        Coordination: He has normal finger to nose coordination and normal heel to shin coordination.        Sensory: There is no sensory deficit in the trunk. There is no sensory deficit in the extremities.        Cranial nerves: Cranial nerve(s) II, III, IV, V, VI, VII, VIII, IX, X, XI and XII are intact.   AAOx4  CN grossly intact  Pupils reactive 2mm,  coloboma R eye   L gaze preference in left eye baseline  Follows commands x4   5/5 strength x 4  SILT             Significant Labs:  Recent Labs   Lab 12/08/20 2104 12/09/20  0233    116*   * 138   K 3.9 4.0   * 105   CO2 21* 22*   BUN 17 16   CREATININE 1.3 1.2   CALCIUM 9.2 9.1   MG  --  1.5*     Recent Labs   Lab 12/08/20  2104 12/09/20  0233   WBC  12.50 10.77   HGB 15.0 13.7*   HCT 43.4 40.8   * 109*     Recent Labs   Lab 12/08/20  2104   INR 1.1   APTT 24.8     Microbiology Results (last 7 days)     ** No results found for the last 168 hours. **        All pertinent labs from the last 24 hours have been reviewed.    Significant Diagnostics:  CT: Ct Head Without Contrast    Result Date: 12/8/2020  2.5 cm right basal ganglia hemorrhage at the level of the thalamus/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  There is no given history of hypertension and correlation is needed with follow-up recommended to exclude other etiology for hemorrhage. There is mild 6 mm right to left shift of midline structures but no significant herniation.  No evidence of intraventricular hemorrhage, hydrocephalus or other sick convincing acute finding. Extensive white matter low density likely is related to chronic small vessel ischemic changes.  Component of white matter infarct is not excluded.  MRI with diffusion imaging could further evaluate.  No evidence of major arterial infarction. Atrophy and additional incidental nonacute findings as above. This report was flagged in Epic as abnormal. Findings were telephoned to the emergency room team at time of interpretation 9: 45. Electronically signed by: Christen Garcia Date:    12/08/2020 Time:    21:52  MRI: No results found in the last 24 hours.  I have reviewed all pertinent imaging results/findings within the past 24 hours.    Assessment/Plan:     * Nontraumatic acute hemorrhage of basal ganglia  Pt is 80yom w pmh CAD, HTN, hyperthyroid, HLD who presents with multipke falls and recent fall thi morning found down after getting out of bed where he lost balance and strength in his legs, denies LOC. Pt AOx4 and states he feelse generalized weakness. CTH showed acute R BG hemorhhage 2.5cm in size. NSGY consulted for evaluation.     Plan:  Admit NCC  Q1hr neurochecks  CBC, CMP, coags, type &  screen  HOB>30  SBP<140  Na 135-150  Keppra  NPO  Repeat CTH in 6hr  Recommend MRI brain w and wo contrast for further evaluation.            Thank you for your consult. I will follow-up with patient. Please contact us if you have any additional questions.    Seth Allen MD  Neurosurgery  [facility]

## 2020-12-09 NOTE — CONSULTS
[facility]  Vascular Neurology  Comprehensive Stroke Center  Consult Note    Inpatient consult to Vascular (Stroke) Neurology  Consult performed by: Miranda Mayer, SHIVA, NP  Consult ordered by: Keira Kim PA-C  Reason for consult: transfer, ICH        Assessment/Plan:     * Nontraumatic acute hemorrhage of basal ganglia  Hever Reyes Jr. is a 80 y.o. male with a significant medical history of BPH, GERD, HLD, HTN, OA, CAD, and hypothyroidism who presents to the hospital as a transfer from [facility] for evaluation of ICH.  He states his leg gave out causing him to fall and he was unable to get up without assistance.  A CTH revealed a right basal ganglia hemorrhage.    Antithrombotics for secondary stroke prevention: Antiplatelets: None: Intracerebral Hemorrhage    Statins for secondary stroke prevention and hyperlipidemia, if present:   Statins: Atorvastatin- 20 mg daily    Aggressive risk factor modification: HTN, HLD, CAD     Rehab efforts: The patient has been evaluated by a stroke team provider and the therapy needs have been fully considered based off the presenting complaints and exam findings. The following therapy evaluations are needed: PT evaluate and treat, OT evaluate and treat, SLP evaluate and treat, PM&R evaluate for appropriate placement    Diagnostics ordered/pending: MRI head w/ without contrast to assess brain parenchyma, TTE to assess cardiac function/status     VTE prophylaxis: Mechanical prophylaxis: Place SCDs  None: Reason for No Pharmacological VTE Prophylaxis: History of systemic or intracranial bleeding    BP parameters: ICH: SBP <140        Vasogenic brain edema  -Moderate area of vasogenic cerebral edema identified when reviewing brain imaging in the territory of the R middle cerebral artery. There is mass effect associated with it. We will continue to monitor the patients clinical exam for any worsening of symptoms which may indicate  expansion of the stroke or the area of the edema resulting in the clinical change. The pattern is suggestive of hypertensive vs brain mass etiology.  -Neurosurgery following  -Repeat brain imaging pending        Essential hypertension  -Stroke risk factor.  SBP<140.    Mixed hyperlipidemia  -Stroke risk factor.  The patient is prescribed atorvastatin 80 mg daily.  -LDL 31.6.  -Recommend atorvastatin 20 mg daily    CAD (coronary artery disease)  Stroke risk factor.  Hold ASA for now 2/2 ICH.    Hypothyroidism  -Stroke risk factor.  Continue home levothyroxine.        STROKE DOCUMENTATION          NIH Scale:  Interval: baseline  1a. Level of Consciousness: 0-->Alert, keenly responsive  1b. LOC Questions: 0-->Answers both questions correctly  1c. LOC Commands: 0-->Performs both tasks correctly  2. Best Gaze: 1-->Partial gaze palsy, gaze is abnormal in one or both eyes, but forced deviation or total gaze paresis is not present(baseline gaze abnormality of the left eye)  3. Visual: 0-->No visual loss  4. Facial Palsy: 0-->Normal symmetrical movements  5a. Motor Arm, Left: 1-->Drift, limb holds 90 (or 45) degrees, but drifts down before full 10 seconds, does not hit bed or other support  5b. Motor Arm, Right: 0-->No drift, limb holds 90 (or 45) degrees for full 10 secs  6a. Motor Leg, Left: 0-->No drift, leg holds 30 degree position for full 5 secs  6b. Motor Leg, Right: 0-->No drift, leg holds 30 degree position for full 5 secs  7. Limb Ataxia: 0-->Absent  8. Sensory: 0-->Normal, no sensory loss  9. Best Language: 0-->No aphasia, normal  10. Dysarthria: 0-->Normal  11. Extinction and Inattention (formerly Neglect): 0-->No abnormality  Total (NIH Stroke Scale): 2    Modified [place] Score: 1  [place] Coma Scale:15   ABCD2 Score:    [id number] Score:   HAS -BLED Score:   ICH Score:0  Hunt & Banegas Classification:       Thrombolysis Candidate? No, CT findings (ICH, SAH, etc)     Delays to Thrombolysis?  No    Interventional  "Revascularization Candidate?   Is the patient eligible for mechanical endovascular reperfusion (LORETTA)?  No; ICH/ SAH       Hemorrhagic change of an Ischemic Stroke: Does this patient have an ischemic stroke with hemorrhagic changes? No     Subjective:     History of Present Illness:  Hever Reyes Jr. is a 80 y.o. male with a significant medical history of BPH, GERD, HLD, HTN, OA, CAD, and hypothyroidism who presents to the hospital as a transfer from [facility] for evaluation of ICH.  The patient was in his usual state of health and states he stood up and attempted to walk and his legs gave out.  He fell subsequently striking the left side of his body including his chest and head.  The patient states he was then unable to get up from the floor without assistance.  He denies LOC or any preceding symptoms.  He presented to the [facility] ED where a CTH was obtained and revealed a right ICH.  The patient was transferred to [facility] for further evaluation by Neurosurgery, higher level of care.  Currently the patient is AA&O x 4.  He is complaining of a right frontal headache and RUE heaviness"  but currently denies dizziness, chest pain, N/V, abdominal pain, or change in vision.  On exam the patient is noted to have a dysconjugate gaze and surgical pupil which are baseline for the patient.  He was seen by Neurosurgery while in ED.  No neurosurgical intervention at this time.  The patient is admitted to [facility].          Past Medical History:   Diagnosis Date    Anemia     BPH (benign prostatic hyperplasia)     Coronary artery disease     GERD (gastroesophageal reflux disease)     HLD (hyperlipidemia)     Hypertension     Hyperthyroidism     Nephrolithiasis     Osteoarthritis     Sciatica     Sinusitis      Past Surgical History:   Procedure Laterality Date    CATARACT EXTRACTION      CORONARY ANGIOPLASTY WITH STENT PLACEMENT      LAPAROSCOPIC CHOLECYSTECTOMY N/A 5/6/2019    Procedure: " CHOLECYSTECTOMY, LAPAROSCOPIC;  Surgeon: Liam Ordonez MD;  Location: [facility] OR;  Service: General;  Laterality: N/A;  Aminah video confirmed 5/3/dme    PROSTATE BIOPSY       Family History   Family history unknown: Yes     Social History     Tobacco Use    Smoking status: Never Smoker    Smokeless tobacco: Never Used   Substance Use Topics    Alcohol use: No    Drug use: No     Review of patient's allergies indicates:  No Known Allergies    Medications: I have reviewed the current medication administration record.    Current Outpatient Medications   Medication Instructions    aspirin (ECOTRIN) 81 mg, Oral, Daily    atorvastatin (LIPITOR) 80 MG tablet Take 1 tablet by mouth once daily    famotidine (PEPCID) 20 mg, Oral, Daily    ferrous sulfate (FEOSOL) 325 mg (65 mg iron) Tab tablet Take 1 tablet by mouth twice daily    fish oil-omega-3 fatty acids 300-1,000 mg capsule Oral, Daily    levothyroxine (SYNTHROID) 200 MCG tablet Take 1 tablet by mouth once daily    metoprolol succinate (TOPROL-XL) 100 MG 24 hr tablet Take 1 tablet by mouth once daily    multivitamin capsule 1 capsule, Oral, Daily    tamsulosin (FLOMAX) 0.4 mg Cap Take 1 capsule by mouth once daily         Review of Systems   Constitutional: Negative for chills, fatigue and fever.   HENT: Negative for drooling, facial swelling and trouble swallowing.    Eyes: Negative for photophobia, pain and visual disturbance.   Respiratory: Positive for chest tightness (currently resolved). Negative for cough, shortness of breath and wheezing.    Cardiovascular: Positive for chest pain (currently resolved). Negative for palpitations and leg swelling.   Gastrointestinal: Negative for abdominal pain, constipation, diarrhea, nausea and vomiting.   Endocrine: Negative for cold intolerance and heat intolerance.   Genitourinary: Negative for dysuria and hematuria.   Musculoskeletal: Positive for gait problem. Negative for neck pain and neck  stiffness.   Skin: Negative for rash and wound.   Allergic/Immunologic: Negative for environmental allergies and food allergies.   Neurological: Positive for dizziness (currently resolved), weakness and headaches (frontal). Negative for tremors, light-headedness and numbness.   Hematological: Negative for adenopathy. Does not bruise/bleed easily.   Psychiatric/Behavioral: Negative for agitation, confusion and hallucinations.     Objective:     Vital Signs (Most Recent):  Temp: 98 °F (36.7 °C) (12/09/20 0109)  Pulse: 95 (12/09/20 0500)  Resp: 15 (12/09/20 0500)  BP: (!) 153/90 (12/09/20 0500)  SpO2: 95 % (12/09/20 0500)    Vital Signs Range (Last 24H):  Temp:  [97.9 °F (36.6 °C)-98 °F (36.7 °C)]   Pulse:  []   Resp:  [15-20]   BP: (120-167)/(49-94)   SpO2:  [92 %-100 %]     Physical Exam  Vitals signs and nursing note reviewed.   Constitutional:       General: He is not in acute distress.     Appearance: He is well-developed. He is not diaphoretic.   HENT:      Head: Normocephalic and atraumatic.      Right Ear: External ear normal.      Left Ear: External ear normal.      Nose: Nose normal.      Mouth/Throat:      Mouth: Mucous membranes are moist.   Eyes:      General: No scleral icterus.        Right eye: No discharge.         Left eye: No discharge.      Extraocular Movements:      Left eye: Abnormal extraocular motion present.      Conjunctiva/sclera: Conjunctivae normal.      Pupils:      Right eye: Pupil is not round.   Neck:      Musculoskeletal: Normal range of motion and neck supple.   Cardiovascular:      Rate and Rhythm: Normal rate and regular rhythm.   Pulmonary:      Effort: Pulmonary effort is normal. No respiratory distress.      Breath sounds: Normal breath sounds.   Abdominal:      General: Bowel sounds are normal. There is no distension.      Palpations: Abdomen is soft.      Tenderness: There is no abdominal tenderness.   Skin:     General: Skin is warm and dry.      Capillary Refill:  Capillary refill takes less than 2 seconds.   Neurological:      Mental Status: He is alert and oriented to person, place, and time.      Motor: Weakness present.         Neurological Exam:   LOC: alert  Attention Span: Good   Articulation: No dysarthria  Facial Movement (CN VII): Symmetric facial expression    Sensation: Intact to light touch, temperature and vibration      Laboratory:  CMP:   Recent Labs   Lab 12/09/20 0233   CALCIUM 9.1   ALBUMIN 3.7   PROT 6.2      K 4.0   CO2 22*      BUN 16   CREATININE 1.2   ALKPHOS 80   ALT 28   AST 41*   BILITOT 2.8*     CBC:   Recent Labs   Lab 12/09/20 0233   WBC 10.77   RBC 4.27*   HGB 13.7*   HCT 40.8   *   MCV 96   MCH 32.1*   MCHC 33.6     Lipid Panel:   Recent Labs   Lab 12/09/20 0233   CHOL 71*   LDLCALC 31.6*   HDL 27*   TRIG 62     Coagulation:   Recent Labs   Lab 12/08/20 2104   INR 1.1   APTT 24.8     Hgb A1C:   Recent Labs   Lab 12/09/20 0233   HGBA1C 5.2     TSH:   Recent Labs   Lab 12/09/20 0233   TSH 0.184*       Diagnostic Results:      Brain imaging:  CTH 12/8/2020 2.5 cm right basal ganglia hemorrhage at the level of the thalamus/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  There is no given history of hypertension and correlation is needed with follow-up recommended to exclude other etiology for hemorrhage.  There is mild 6 mm right to left shift of midline structures but no significant herniation.  No evidence of intraventricular hemorrhage, hydrocephalus or other sick convincing acute finding.  Extensive white matter low density likely is related to chronic small vessel ischemic changes. Component of white matter infarct is not excluded.  MRI with diffusion imaging could further evaluate.  No evidence of major arterial infarction.  Atrophy and additional incidental nonacute findings as above.      Vessel Imaging:  CTA Head and Neck pending    Cardiac Evaluation:   TTE pending      Miranda Mayer DNP,  NP  [facility]  Department of Vascular Neurology   [facility]     This medical record was prepared using voice recognition software and may contain phonetic and/or or grammatical errors.  Garbled syntax, mangled pronounciations, and other bizarre constructions may be attributed to that system.

## 2020-12-09 NOTE — ED NOTES
PT at bedside evaluating pt; pt tolerating well. Pt resting comfortably and independently repositioned in stretcher with bed locked in lowest position for safety. NAD noted at this time. Respirations even and unlabored and visible chest rise noted.  Patient offered bathroom assistance and denies need at this time. Pt instructed to call if assistance is needed. Pt on continuous cardiac, BP, and O2 monitoring. Call light within reach. Son at bedside. Updated on POC. No needs at this time. Will continue to monitor.

## 2020-12-09 NOTE — ASSESSMENT & PLAN NOTE
SBP <160, labetalol prn    -takes metoprolol ER 100mg at home, hold ER in acute setting  -can convert to metoprolol IR 50 BID if needed

## 2020-12-09 NOTE — ED NOTES
OT at bedside evaluating pt. Pt resting comfortably and independently repositioned in stretcher with bed locked in lowest position for safety. NAD noted at this time. Respirations even and unlabored and visible chest rise noted.  Patient offered bathroom assistance and denies need at this time. Pt instructed to call if assistance is needed. Pt on continuous cardiac, BP, and O2 monitoring. Call light within reach. No needs at this time. Will continue to monitor.

## 2020-12-09 NOTE — SUBJECTIVE & OBJECTIVE
Past Medical History:   Diagnosis Date    Anemia     BPH (benign prostatic hyperplasia)     Coronary artery disease     GERD (gastroesophageal reflux disease)     HLD (hyperlipidemia)     Hypertension     Hyperthyroidism     Nephrolithiasis     Osteoarthritis     Sciatica     Sinusitis      Past Surgical History:   Procedure Laterality Date    CATARACT EXTRACTION      CORONARY ANGIOPLASTY WITH STENT PLACEMENT      LAPAROSCOPIC CHOLECYSTECTOMY N/A 5/6/2019    Procedure: CHOLECYSTECTOMY, LAPAROSCOPIC;  Surgeon: Liam Ordonez MD;  Location: [facility];  Service: General;  Laterality: N/A;  [place] video confirmed 5/3/dme    PROSTATE BIOPSY       Family History   Family history unknown: Yes     Social History     Tobacco Use    Smoking status: Never Smoker    Smokeless tobacco: Never Used   Substance Use Topics    Alcohol use: No    Drug use: No     Review of patient's allergies indicates:  No Known Allergies    Medications: I have reviewed the current medication administration record.    Current Outpatient Medications   Medication Instructions    aspirin (ECOTRIN) 81 mg, Oral, Daily    atorvastatin (LIPITOR) 80 MG tablet Take 1 tablet by mouth once daily    famotidine (PEPCID) 20 mg, Oral, Daily    ferrous sulfate (FEOSOL) 325 mg (65 mg iron) Tab tablet Take 1 tablet by mouth twice daily    fish oil-omega-3 fatty acids 300-1,000 mg capsule Oral, Daily    levothyroxine (SYNTHROID) 200 MCG tablet Take 1 tablet by mouth once daily    metoprolol succinate (TOPROL-XL) 100 MG 24 hr tablet Take 1 tablet by mouth once daily    multivitamin capsule 1 capsule, Oral, Daily    tamsulosin (FLOMAX) 0.4 mg Cap Take 1 capsule by mouth once daily         Review of Systems   Constitutional: Negative for chills, fatigue and fever.   HENT: Negative for drooling, facial swelling and trouble swallowing.    Eyes: Negative for photophobia, pain and visual disturbance.   Respiratory: Positive for chest  tightness (currently resolved). Negative for cough, shortness of breath and wheezing.    Cardiovascular: Positive for chest pain (currently resolved). Negative for palpitations and leg swelling.   Gastrointestinal: Negative for abdominal pain, constipation, diarrhea, nausea and vomiting.   Endocrine: Negative for cold intolerance and heat intolerance.   Genitourinary: Negative for dysuria and hematuria.   Musculoskeletal: Positive for gait problem. Negative for neck pain and neck stiffness.   Skin: Negative for rash and wound.   Allergic/Immunologic: Negative for environmental allergies and food allergies.   Neurological: Positive for dizziness (currently resolved), weakness and headaches (frontal). Negative for tremors, light-headedness and numbness.   Hematological: Negative for adenopathy. Does not bruise/bleed easily.   Psychiatric/Behavioral: Negative for agitation, confusion and hallucinations.     Objective:     Vital Signs (Most Recent):  Temp: 98 °F (36.7 °C) (12/09/20 0109)  Pulse: 95 (12/09/20 0500)  Resp: 15 (12/09/20 0500)  BP: (!) 153/90 (12/09/20 0500)  SpO2: 95 % (12/09/20 0500)    Vital Signs Range (Last 24H):  Temp:  [97.9 °F (36.6 °C)-98 °F (36.7 °C)]   Pulse:  []   Resp:  [15-20]   BP: (120-167)/(49-94)   SpO2:  [92 %-100 %]     Physical Exam  Vitals signs and nursing note reviewed.   Constitutional:       General: He is not in acute distress.     Appearance: He is well-developed. He is not diaphoretic.   HENT:      Head: Normocephalic and atraumatic.      Right Ear: External ear normal.      Left Ear: External ear normal.      Nose: Nose normal.      Mouth/Throat:      Mouth: Mucous membranes are moist.   Eyes:      General: No scleral icterus.        Right eye: No discharge.         Left eye: No discharge.      Extraocular Movements:      Left eye: Abnormal extraocular motion present.      Conjunctiva/sclera: Conjunctivae normal.      Pupils:      Right eye: Pupil is not round.   Neck:       Musculoskeletal: Normal range of motion and neck supple.   Cardiovascular:      Rate and Rhythm: Normal rate and regular rhythm.   Pulmonary:      Effort: Pulmonary effort is normal. No respiratory distress.      Breath sounds: Normal breath sounds.   Abdominal:      General: Bowel sounds are normal. There is no distension.      Palpations: Abdomen is soft.      Tenderness: There is no abdominal tenderness.   Skin:     General: Skin is warm and dry.      Capillary Refill: Capillary refill takes less than 2 seconds.   Neurological:      Mental Status: He is alert and oriented to person, place, and time.      Motor: Weakness present.         Neurological Exam:   LOC: alert  Attention Span: Good   Articulation: No dysarthria  Facial Movement (CN VII): Symmetric facial expression    Sensation: Intact to light touch, temperature and vibration      Laboratory:  CMP:   Recent Labs   Lab 12/09/20 0233   CALCIUM 9.1   ALBUMIN 3.7   PROT 6.2      K 4.0   CO2 22*      BUN 16   CREATININE 1.2   ALKPHOS 80   ALT 28   AST 41*   BILITOT 2.8*     CBC:   Recent Labs   Lab 12/09/20 0233   WBC 10.77   RBC 4.27*   HGB 13.7*   HCT 40.8   *   MCV 96   MCH 32.1*   MCHC 33.6     Lipid Panel:   Recent Labs   Lab 12/09/20 0233   CHOL 71*   LDLCALC 31.6*   HDL 27*   TRIG 62     Coagulation:   Recent Labs   Lab 12/08/20  2104   INR 1.1   APTT 24.8     Hgb A1C:   Recent Labs   Lab 12/09/20 0233   HGBA1C 5.2     TSH:   Recent Labs   Lab 12/09/20 0233   TSH 0.184*       Diagnostic Results:      Brain imaging:  CT 12/8/2020 2.5 cm right basal ganglia hemorrhage at the level of the thalamus/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  There is no given history of hypertension and correlation is needed with follow-up recommended to exclude other etiology for hemorrhage.  There is mild 6 mm right to left shift of midline structures but no significant herniation.  No evidence of intraventricular hemorrhage,  hydrocephalus or other sick convincing acute finding.  Extensive white matter low density likely is related to chronic small vessel ischemic changes. Component of white matter infarct is not excluded.  MRI with diffusion imaging could further evaluate.  No evidence of major arterial infarction.  Atrophy and additional incidental nonacute findings as above.      Vessel Imaging:  CTA Head and Neck pending    Cardiac Evaluation:   TTE pending

## 2020-12-09 NOTE — HOSPITAL COURSE
12/9: admitted to [facility]  12/10: KIRBY. Patient's repeat CT head stable. Transfer to floor under Vascular Neurology.

## 2020-12-09 NOTE — SUBJECTIVE & OBJECTIVE
Past Medical History:   Diagnosis Date    Anemia     BPH (benign prostatic hyperplasia)     Coronary artery disease     GERD (gastroesophageal reflux disease)     HLD (hyperlipidemia)     Hypertension     Hyperthyroidism     Nephrolithiasis     Osteoarthritis     Sciatica     Sinusitis      Past Surgical History:   Procedure Laterality Date    CATARACT EXTRACTION      CORONARY ANGIOPLASTY WITH STENT PLACEMENT      LAPAROSCOPIC CHOLECYSTECTOMY N/A 5/6/2019    Procedure: CHOLECYSTECTOMY, LAPAROSCOPIC;  Surgeon: Liam Ordonez MD;  Location: [facility];  Service: General;  Laterality: N/A;  [place] video confirmed 5/3/dme    PROSTATE BIOPSY        Current Facility-Administered Medications on File Prior to Encounter   Medication Dose Route Frequency Provider Last Rate Last Dose    [COMPLETED] ondansetron injection 4 mg  4 mg Intravenous ED 1 Time Sheng Prater III, MD   4 mg at 12/08/20 2143     Current Outpatient Medications on File Prior to Encounter   Medication Sig Dispense Refill    aspirin (ECOTRIN) 81 MG EC tablet Take 81 mg by mouth once daily.      atorvastatin (LIPITOR) 80 MG tablet Take 1 tablet by mouth once daily 90 tablet 5    famotidine (PEPCID) 20 MG tablet Take 1 tablet (20 mg total) by mouth once daily. 90 tablet 3    ferrous sulfate (FEOSOL) 325 mg (65 mg iron) Tab tablet Take 1 tablet by mouth twice daily 90 tablet 5    fish oil-omega-3 fatty acids 300-1,000 mg capsule Take by mouth once daily.      levothyroxine (SYNTHROID) 200 MCG tablet Take 1 tablet by mouth once daily 90 tablet 5    metoprolol succinate (TOPROL-XL) 100 MG 24 hr tablet Take 1 tablet by mouth once daily 90 tablet 5    multivitamin capsule Take 1 capsule by mouth once daily.      tamsulosin (FLOMAX) 0.4 mg Cap Take 1 capsule by mouth once daily 90 capsule 5      Allergies: Patient has no known allergies.    Family History   Family history unknown: Yes     Social History     Tobacco Use     Smoking status: Never Smoker    Smokeless tobacco: Never Used   Substance Use Topics    Alcohol use: No    Drug use: No     Review of Systems   Constitutional: Negative for activity change, chills and fever.   HENT: Positive for sneezing. Negative for congestion, rhinorrhea and trouble swallowing.    Eyes: Negative for pain and redness.        Baseline poor eye sight, unchanged post fall   Respiratory: Negative for chest tightness, shortness of breath and wheezing.    Cardiovascular: Negative for chest pain and palpitations.   Gastrointestinal: Negative for abdominal pain, constipation, diarrhea, nausea and vomiting.   Endocrine: Negative for polyuria.   Genitourinary: Negative for difficulty urinating, dysuria and urgency.   Musculoskeletal: Positive for arthralgias. Negative for neck pain and neck stiffness.        Rib pain from fall   Skin: Negative for rash.   Neurological: Positive for weakness and headaches. Negative for dizziness, tremors, seizures, syncope, speech difficulty, light-headedness and numbness.        Feels weak, couldn't get himself up after falling this AM   Psychiatric/Behavioral: Negative for agitation, behavioral problems and confusion.     Objective:     Vitals:    Temp: 98 °F (36.7 °C)  Pulse: 99  BP: (!) 151/72  MAP (mmHg): 102  Resp: 17  SpO2: 98 %  O2 Device (Oxygen Therapy): nasal cannula    Temp  Min: 97.9 °F (36.6 °C)  Max: 98 °F (36.7 °C)  Pulse  Min: 72  Max: 103  BP  Min: 120/63  Max: 167/94  MAP (mmHg)  Min: 80  Max: 102  Resp  Min: 16  Max: 18  SpO2  Min: 92 %  Max: 100 %    No intake/output data recorded.           Physical Exam  Constitutional:       General: He is not in acute distress.     Appearance: He is obese.   HENT:      Head: Normocephalic.      Right Ear: External ear normal.      Left Ear: External ear normal.      Nose: Nose normal. No rhinorrhea.      Mouth/Throat:      Mouth: Mucous membranes are moist.      Pharynx: Oropharynx is clear.   Eyes:      General:          Right eye: No discharge.         Left eye: No discharge.      Conjunctiva/sclera: Conjunctivae normal.      Comments: Right coloboma baseline, Left eye leftward gaze preference baseline   Neck:      Musculoskeletal: Neck supple. No neck rigidity.   Cardiovascular:      Rate and Rhythm: Normal rate and regular rhythm.      Pulses: Normal pulses.   Pulmonary:      Effort: Pulmonary effort is normal. No respiratory distress.      Breath sounds: No stridor. No wheezing or rhonchi.   Abdominal:      General: There is no distension.      Palpations: Abdomen is soft. There is no mass.      Tenderness: There is no abdominal tenderness.      Hernia: No hernia is present.   Musculoskeletal:         General: Signs of injury present. No deformity.      Right lower leg: No edema.      Left lower leg: No edema.   Skin:     General: Skin is warm and dry.      Findings: No rash.   Neurological:      Mental Status: He is alert.      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         Behavior: Behavior normal.       Unable to test coordination, gait due to level of consciousness.    Today I personally reviewed pertinent medications, lines/drains/airways, imaging, cardiology results, laboratory results, microbiology results, notably:  CT, coags

## 2020-12-09 NOTE — PT/OT/SLP EVAL
Physical Therapy Evaluation and Treatment     Patient Name:  Hever Reyes Jr.   MRN:  [id number]    Recommendations:     Discharge Recommendations:  rehabilitation facility   Discharge Equipment Recommendations: (TBD based on progress)   Barriers to discharge: requires increased assistance for functional mobility     Assessment:     Hever Reyes Jr. is a 80 y.o. male admitted with a medical diagnosis of Nontraumatic acute hemorrhage of basal ganglia.  He presents with the following impairments/functional limitations:  weakness, impaired endurance, impaired sensation, impaired functional mobilty, gait instability, impaired balance, decreased upper extremity function, decreased lower extremity function, decreased safety awareness, impaired self care skills, impaired skin. Patient tolerated PT session fairly well. He ambulated 20ft with R HHA/CGA. No LOB noted. PT assisted patient with getting cleaned up and changing bed due to incontinent of urine. He would benefit from inpatient rehab at discharge in order to get back to being independent with ambulation.     Rehab Prognosis: Good; patient would benefit from acute skilled PT services to address these deficits and reach maximum level of function.    Recent Surgery: * No surgery found *      Plan:     During this hospitalization, patient to be seen 4 x/week to address the identified rehab impairments via gait training, therapeutic activities, therapeutic exercises, neuromuscular re-education and progress toward the following goals:    · Plan of Care Expires:  12/23/20    Subjective     Chief Complaint: Had a fall at home prior to coming into the hospital.   Patient/Family Comments/goals: To get back to PLOF.   Pain/Comfort:  · Pain Rating 1: 0/10    Living Environment:  Patient lives with his son, daughter in law, and 3 grandchildren (14, 13, 10). Prior to admission, patients level of function was independent for functional mobility. Equipment used at home: none.  Upon discharge, patient will have assistance from son.    Objective:     Communicated with RN prior to session.  Patient found supine with blood pressure cuff, telemetry, pulse ox (continuous), peripheral IV  upon PT entry to room. Son present in room.     General Precautions: Standard, aspiration, fall, seizure, vision impaired   Orthopedic Precautions:N/A   Braces: N/A     Exams:  · Cognitive Exam:  Patient is oriented to Person and Situation  · Gross Motor Coordination:  WFL  · Sensation: intact   · RLE ROM: WFL  · RLE Strength: 4/5  · LLE ROM: WFL  · LLE Strength: 4/5  · Follows 2 step commands     Functional Mobility:  · Bed Mobility:     · Rolling Left:  minimum assistance  · Scooting: minimum assistance  · Supine to Sit: minimum assistance  · Sit to Supine: minimum assistance  · Transfers:     · Sit to Stand:  contact guard assistance with hand-held assist x3 from stretcher   · Gait:  Patient ambulated 20ft with right hand held assistance and contact guard assistance. Minimal swaying noted but no loss of balance.   · Balance: Patient required minimal assistance for balance while performing standing pericare.     Therapeutic Activities and Exercises:  Patient educated in:  -PT role and POC  -safety with transfers including hand placement  -OOB activity to maximize recovery including ambulating with nursing staff assistance while in the hospital       AM-PAC 6 CLICK MOBILITY  Total Score:18     Patient left supine with all lines intact, call button in reach, RN notified and son  present.    GOALS:   Multidisciplinary Problems     Physical Therapy Goals        Problem: Physical Therapy Goal    Goal Priority Disciplines Outcome Goal Variances Interventions   Physical Therapy Goal     PT, PT/OT Ongoing, Progressing     Description: Goals to be met by: 2020    Patient will increase functional independence with mobility by performin. Supine to sit with supervision  2. Sit to stand transfer with  Juan C Giles MD  Signing Provider: MD YVON Flores/sljesus ([id number])  DD: 09/25/2018 13:57:48 TD: 09/26/2018 10:26:45    Copy Sent To:      Supervision  3. Gait x250 feet with Supervision    4. Lower extremity exercise program x30 reps, with supervision                     History:     Past Medical History:   Diagnosis Date    Anemia     BPH (benign prostatic hyperplasia)     Coronary artery disease     GERD (gastroesophageal reflux disease)     HLD (hyperlipidemia)     Hypertension     Hyperthyroidism     Nephrolithiasis     Osteoarthritis     Sciatica     Sinusitis        Past Surgical History:   Procedure Laterality Date    CATARACT EXTRACTION      CORONARY ANGIOPLASTY WITH STENT PLACEMENT      LAPAROSCOPIC CHOLECYSTECTOMY N/A 5/6/2019    Procedure: CHOLECYSTECTOMY, LAPAROSCOPIC;  Surgeon: Liam Ordonez MD;  Location: [facility];  Service: General;  Laterality: N/A;  [place] video confirmed 5/3/dme    PROSTATE BIOPSY         Time Tracking:     PT Received On: 12/09/20  PT Start Time: 1403     PT Stop Time: 1423  PT Total Time (min): 20 min     Billable Minutes: Evaluation  10  and Gait Training 10       Hali Kovacs, PT  12/09/2020

## 2020-12-09 NOTE — PLAN OF CARE
Problem: SLP Goal  Goal: SLP Goal  Description: Speech-Language Pathology Goals   Goals to be met by 12/16/20  1. Patient will participate in ongoing swallow assessment in order to determine safest least restrictive diet.   2. Patient will participate in a speech/language/cognitive assessment.   Outcome: Ongoing, Progressing     Patient seen for a bedside swallow eval. SLP recommending a mechanical soft diet (level 5)/thin liquids at this time.     Job Zhou CCC-SLP  Speech-Language Pathology  Pager: [phone]

## 2020-12-09 NOTE — ED NOTES
Pt resting comfortably and independently repositioned in stretcher with bed locked in lowest position for safety. NAD noted at this time. Respirations even and unlabored and visible chest rise noted.  Patient offered bathroom assistance and denies need at this time. Pt instructed to call if assistance is needed. Pt on continuous cardiac, BP, and O2 monitoring. Call light within reach. Updated on POC. No needs at this time. Will continue to monitor.

## 2020-12-09 NOTE — CONSULTS
Inpatient consult to Physical Medicine Rehab  Consult performed by: LAWRENCE Rodriguez  Consult ordered by: Keira Kim PA-C  Reason for consult: assess rehab needs      Consult received.  Reviewed patient history and current admission.  PM&R following.    LUIS Rodriguez, LAWRENCE-C  Physical Medicine & Rehabilitation   12/09/2020

## 2020-12-09 NOTE — ED PROVIDER NOTES
Encounter Date: 12/9/2020       History     Chief Complaint   Patient presents with    Transfer     Pt had fall. 2.5 cm BG bleed. Pt is aox4 but has short term confusion.      80-year-old male transferred to our facility for higher level of care.  The patient has had fall the past couple of days.  Today the patient had a fall resulting in head injury.  The patient was found subsequently on the floor approximately 2 hr later.  A CT scan at the outside facility revealed a 2.5 cm basal ganglia hemorrhage.  The patient is on baby aspirin once daily.  He is awake alert and oriented.  He is answering questions and following commands.  His exam is nonfocal.        Review of patient's allergies indicates:  No Known Allergies  Past Medical History:   Diagnosis Date    Anemia     BPH (benign prostatic hyperplasia)     Coronary artery disease     GERD (gastroesophageal reflux disease)     HLD (hyperlipidemia)     Hypertension     Hyperthyroidism     Nephrolithiasis     Osteoarthritis     Sciatica     Sinusitis      Past Surgical History:   Procedure Laterality Date    CATARACT EXTRACTION      CORONARY ANGIOPLASTY WITH STENT PLACEMENT      LAPAROSCOPIC CHOLECYSTECTOMY N/A 5/6/2019    Procedure: CHOLECYSTECTOMY, LAPAROSCOPIC;  Surgeon: Liam Ordonez MD;  Location: [facility];  Service: General;  Laterality: N/A;  Silvis video confirmed 5/3/dme    PROSTATE BIOPSY       Family History   Family history unknown: Yes     Social History     Tobacco Use    Smoking status: Never Smoker    Smokeless tobacco: Never Used   Substance Use Topics    Alcohol use: No    Drug use: No     Review of Systems   Constitutional: Negative for fever.        Multiple falls.   HENT: Negative for sore throat.    Respiratory: Negative for shortness of breath.    Cardiovascular: Negative for chest pain.   Gastrointestinal: Negative for nausea.   Genitourinary: Negative for dysuria.   Musculoskeletal: Negative for back pain.    Skin: Negative for rash.   Neurological: Negative for weakness.   Hematological: Does not bruise/bleed easily.       Physical Exam     Initial Vitals [12/09/20 0109]   BP Pulse Resp Temp SpO2   137/86 72 16 98 °F (36.7 °C) 100 %      MAP       --         Physical Exam    Constitutional: Vital signs are normal. He appears well-developed and well-nourished.   HENT:   Head: Normocephalic and atraumatic.   Right Ear: Hearing normal.   Left Ear: Hearing normal.   Eyes: Conjunctivae are normal.   Cardiovascular: Normal rate and regular rhythm.   Abdominal: Soft. Normal appearance and bowel sounds are normal.   Musculoskeletal: Normal range of motion.   Neurological: He is alert and oriented to person, place, and time.   Nonfocal, no acute deficits, no red flags   Skin: Skin is warm and intact.   Psychiatric: He has a normal mood and affect. His speech is normal and behavior is normal. Cognition and memory are normal.         ED Course   Critical Care    Date/Time: 12/9/2020 2:20 AM  Performed by: Roberto Myles PA-C  Authorized by: Diane Flores MD   Direct patient critical care time: 10 minutes  Additional history critical care time: 10 minutes  Ordering / reviewing critical care time: 5 minutes  Documentation critical care time: 5 minutes  Consulting other physicians critical care time: 10 minutes  Consult with family critical care time: 10 minutes  Total critical care time (exclusive of procedural time) : 50 minutes  Critical care was necessary to treat or prevent imminent or life-threatening deterioration of the following conditions: CNS failure or compromise.  Critical care was time spent personally by me on the following activities: pulse oximetry, review of old charts, ordering and review of radiographic studies and examination of patient.        Labs Reviewed   URINALYSIS, REFLEX TO URINE CULTURE   COMPREHENSIVE METABOLIC PANEL   LIPID PANEL   HEMOGLOBIN A1C   TSH   CBC W/ AUTO DIFFERENTIAL   MAGNESIUM    PHOSPHORUS   CK-MB   TYPE & SCREEN          Imaging Results    None          Medical Decision Making:   History:   I obtained history from: EMS provider.  Old Medical Records: I decided to obtain old medical records.  Old Records Summarized: records from another hospital.  Initial Assessment:   80-year-old male presenting to the ER for higher level of care and neurosurgery consultation  Differential Diagnosis:   ICH, stroke, electrolyte abnormality  Clinical Tests:   Lab Tests: Reviewed  Radiological Study: Reviewed  Medical Tests: Reviewed  ED Management:  Plan:  I will discuss the case with Neurosurgery and with neurocritical care.  Will get a CTA of the brain.  The patient will be admitted to critical care.  Other:   I discussed test(s) with the performing physician.  I have discussed this case with another health care provider.                             Clinical Impression:       ICD-10-CM ICD-9-CM   1. Traumatic hemorrhage of cerebrum without loss of consciousness, unspecified laterality, initial encounter  S06.360A 853.01                      Disposition:   Disposition: Admitted  Condition: Critical     ED Disposition Condition    Admit                             Roberto Myles PA-C  12/09/20 0220

## 2020-12-09 NOTE — ED NOTES
Echo at bedside. Pt resting comfortably and independently repositioned in stretcher with bed locked in lowest position for safety. NAD noted at this time. Respirations even and unlabored and visible chest rise noted.  Patient offered bathroom assistance and denies need at this time. Pt instructed to call if assistance is needed. Pt on continuous cardiac, BP, and O2 monitoring. Call light within reach. Updated on POC. No needs at this time. Will continue to monitor.

## 2020-12-09 NOTE — PLAN OF CARE
Patient tolerated PT session fairly well. He ambulated 20ft with R HHA/CGA. No LOB noted. PT assisted patient with getting cleaned up and changing bed due to incontinent of urine. He would benefit from inpatient rehab at discharge in order to get back to being independent with ambulation.     Problem: Physical Therapy Goal  Goal: Physical Therapy Goal  Description: Goals to be met by: 2020    Patient will increase functional independence with mobility by performin. Supine to sit with supervision  2. Sit to stand transfer with Supervision  3. Gait x250 feet with Supervision    4. Lower extremity exercise program x30 reps, with supervision    Outcome: Ongoing, Progressing

## 2020-12-09 NOTE — PT/OT/SLP EVAL
"Speech Language Pathology Evaluation  Bedside Swallow    Patient Name:  Hever Reyes Jr.   MRN:  [id number]  Admitting Diagnosis: Nontraumatic acute hemorrhage of basal ganglia    Recommendations:                 General Recommendations:  Speech language evaluation, Cognitive-linguistic evaluation and ongoing swallow assessment  Diet recommendations:  Mechanical soft, Thin   Aspiration Precautions: 1 bite/sip at a time, Feed only when awake/alert, Meds whole 1 at a time, Monitor for s/s of aspiration, Small bites/sips and Standard aspiration precautions   General Precautions: Standard,    Communication strategies:  provide increased time to answer    History:     Past Medical History:   Diagnosis Date    Anemia     BPH (benign prostatic hyperplasia)     Coronary artery disease     GERD (gastroesophageal reflux disease)     HLD (hyperlipidemia)     Hypertension     Hyperthyroidism     Nephrolithiasis     Osteoarthritis     Sciatica     Sinusitis        Past Surgical History:   Procedure Laterality Date    CATARACT EXTRACTION      CORONARY ANGIOPLASTY WITH STENT PLACEMENT      LAPAROSCOPIC CHOLECYSTECTOMY N/A 5/6/2019    Procedure: CHOLECYSTECTOMY, LAPAROSCOPIC;  Surgeon: Liam Ordonez MD;  Location: [facility];  Service: General;  Laterality: N/A;  Aminah video confirmed 5/3/dme    PROSTATE BIOPSY       Prior Intubation HX:  None this admission    Modified Barium Swallow: None on file    Chest X-Rays 12/9/20: Suspected atelectasis at the left lung base, without additional radiographic evidence for acute process when accounting for patient positioning.    Prior diet: reg/thin    Subjective     Patient awake/alert   "They told me I'm in the hospital, but I keep thinking I'm at home."  Communicated with nurse prior to session    Pain/Comfort:  · Pain Rating 1: 0/10  · Pain Rating Post-Intervention 1: 0/10    Objective:     Oral Musculature Evaluation  · Oral Musculature: WFL  · Dentition: " edentulous, uses dentures to eat (patient's son bringing from home)  · Secretion Management: adequate  · Mucosal Quality: good  · Mandibular Strength and Mobility: WFL  · Oral Labial Strength and Mobility: WFL  · Lingual Strength and Mobility: WFL  · Buccal Strength and Mobility: WFL  · Volitional Cough: adequate  · Volitional Swallow: timely; adequae laryngeal elevation  · Voice Prior to PO Intake: WFL    Bedside Swallow Eval:   Consistencies Assessed:  · Thin liquids x6 (tsp and straw sips)  · Puree x3 (tsp applesauce)  · Solids x3 (meena crackers)     Oral Phase:   · Prolonged mastication of solid trials with adequate oral clearance    Pharyngeal Phase:   · no overt clinical signs/symptoms of aspiration  · no overt clinical signs/symptoms of pharyngeal dysphagia    Compensatory Strategies  · None    Treatment: Patient seen for a bedside swallow eval. He was sitting up in bed with his son present during the session. Son reported increased confusion of patient since admit. SLP educated them regarding recs. Patient left in bed with son present and call light within reach. Recs communicated to team.    Assessment:     Hever Reyes Jr. is a 80 y.o. male a safe oropharyngeal swallow at this time. Patient's son reports increased confusion and ST will f/u for monitoring of diet tolerance and a speech/language/cog eval.    Goals:   Multidisciplinary Problems     SLP Goals        Problem: SLP Goal    Goal Priority Disciplines Outcome   SLP Goal     SLP Ongoing, Progressing   Description: Speech-Language Pathology Goals   Goals to be met by 12/16/20  1. Patient will participate in ongoing swallow assessment in order to determine safest least restrictive diet.   2. Patient will participate in a speech/language/cognitive assessment.                    Plan:     · Patient to be seen:  4 x/week   · Plan of Care expires:  01/08/21  · Plan of Care reviewed with:  patient, son   · SLP Follow-Up:  Yes       Discharge  recommendations:  (pending)   Barriers to Discharge:  Level of Skilled Assistance Needed     Time Tracking:     SLP Treatment Date:   12/09/20  Speech Start Time:  0930  Speech Stop Time:  0941     Speech Total Time (min):  11 min    Billable Minutes: Eval Swallow and Oral Function 11    Job Zhou CCC-SLP  Speech-Language Pathology  Pager: [phone]   12/09/2020

## 2020-12-09 NOTE — SUBJECTIVE & OBJECTIVE
(Not in a hospital admission)      Review of patient's allergies indicates:  No Known Allergies    Past Medical History:   Diagnosis Date    Anemia     BPH (benign prostatic hyperplasia)     Coronary artery disease     GERD (gastroesophageal reflux disease)     HLD (hyperlipidemia)     Hypertension     Hyperthyroidism     Nephrolithiasis     Osteoarthritis     Sciatica     Sinusitis      Past Surgical History:   Procedure Laterality Date    CATARACT EXTRACTION      CORONARY ANGIOPLASTY WITH STENT PLACEMENT      LAPAROSCOPIC CHOLECYSTECTOMY N/A 5/6/2019    Procedure: CHOLECYSTECTOMY, LAPAROSCOPIC;  Surgeon: Liam Ordonez MD;  Location: [facility];  Service: General;  Laterality: N/A;  Aminah video confirmed 5/3/dme    PROSTATE BIOPSY       Family History     Family history is unknown by patient.        Tobacco Use    Smoking status: Never Smoker    Smokeless tobacco: Never Used   Substance and Sexual Activity    Alcohol use: No    Drug use: No    Sexual activity: Not on file     Review of Systems   Constitutional: Negative for activity change and fatigue.   Eyes: Negative for photophobia and visual disturbance.   Respiratory: Positive for chest tightness. Negative for shortness of breath.    Cardiovascular: Positive for chest pain.   Gastrointestinal: Negative for nausea and vomiting.   Musculoskeletal: Negative for back pain, neck pain and neck stiffness.   Neurological: Positive for dizziness, weakness and headaches. Negative for speech difficulty, light-headedness and numbness.     Objective:     Weight: 93.9 kg (207 lb)  Body mass index is 34.45 kg/m².  Vital Signs (Most Recent):  Temp: 98 °F (36.7 °C) (12/09/20 0109)  Pulse: 97 (12/09/20 0300)  Resp: 20 (12/09/20 0300)  BP: 132/71 (12/09/20 0300)  SpO2: 96 % (12/09/20 0300) Vital Signs (24h Range):  Temp:  [97.9 °F (36.6 °C)-98 °F (36.7 °C)] 98 °F (36.7 °C)  Pulse:  [] 97  Resp:  [16-20] 20  SpO2:  [92 %-100 %] 96 %  BP:  (120-167)/(49-94) 132/71                          Physical Exam:  Nursing note and vitals reviewed.    Constitutional: He appears well-developed and well-nourished.     Cardiovascular: Normal rate and regular rhythm.     Abdominal: Soft.     Skin: Skin displays no lesions on extremities.     Psych/Behavior: He is alert. He is oriented to person, place, and time.     Musculoskeletal:        Right Upper Extremities: Range of motion is full. Tone is normal.        Left Upper Extremities: Range of motion is full. Tone is normal.       Right Lower Extremities: Range of motion is full. Tone is normal.        Left Lower Extremities: Range of motion is full. Tone is normal.     Neurological:        Coordination: He has normal finger to nose coordination and normal heel to shin coordination.        Sensory: There is no sensory deficit in the trunk. There is no sensory deficit in the extremities.        Cranial nerves: Cranial nerve(s) II, III, IV, V, VI, VII, VIII, IX, X, XI and XII are intact.   AAOx4  CN grossly intact  Pupils reactive 2mm,  coloboma R eye   L gaze preference in left eye baseline  Follows commands x4   5/5 strength x 4  SILT             Significant Labs:  Recent Labs   Lab 12/08/20 2104 12/09/20  0233    116*   * 138   K 3.9 4.0   * 105   CO2 21* 22*   BUN 17 16   CREATININE 1.3 1.2   CALCIUM 9.2 9.1   MG  --  1.5*     Recent Labs   Lab 12/08/20 2104 12/09/20  0233   WBC 12.50 10.77   HGB 15.0 13.7*   HCT 43.4 40.8   * 109*     Recent Labs   Lab 12/08/20 2104   INR 1.1   APTT 24.8     Microbiology Results (last 7 days)     ** No results found for the last 168 hours. **        All pertinent labs from the last 24 hours have been reviewed.    Significant Diagnostics:  CT: Ct Head Without Contrast    Result Date: 12/8/2020  2.5 cm right basal ganglia hemorrhage at the level of the thalamus/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  There is no given history of  hypertension and correlation is needed with follow-up recommended to exclude other etiology for hemorrhage. There is mild 6 mm right to left shift of midline structures but no significant herniation.  No evidence of intraventricular hemorrhage, hydrocephalus or other sick convincing acute finding. Extensive white matter low density likely is related to chronic small vessel ischemic changes.  Component of white matter infarct is not excluded.  MRI with diffusion imaging could further evaluate.  No evidence of major arterial infarction. Atrophy and additional incidental nonacute findings as above. This report was flagged in Epic as abnormal. Findings were telephoned to the emergency room team at time of interpretation 9: 45. Electronically signed by: Christen Garcia Date:    12/08/2020 Time:    21:52  MRI: No results found in the last 24 hours.  I have reviewed all pertinent imaging results/findings within the past 24 hours.

## 2020-12-10 ENCOUNTER — RESEARCH ENCOUNTER (OUTPATIENT)
Dept: RESEARCH | Facility: HOSPITAL | Age: 80
End: 2020-12-10

## 2020-12-10 LAB
ALBUMIN SERPL BCP-MCNC: 3.3 G/DL (ref 3.5–5.2)
ALP SERPL-CCNC: 77 U/L (ref 55–135)
ALT SERPL W/O P-5'-P-CCNC: 27 U/L (ref 10–44)
ANION GAP SERPL CALC-SCNC: 13 MMOL/L (ref 8–16)
AST SERPL-CCNC: 54 U/L (ref 10–40)
BASOPHILS # BLD AUTO: 0.04 K/UL (ref 0–0.2)
BASOPHILS NFR BLD: 0.5 % (ref 0–1.9)
BILIRUB SERPL-MCNC: 1.6 MG/DL (ref 0.1–1)
BUN SERPL-MCNC: 19 MG/DL (ref 8–23)
CALCIUM SERPL-MCNC: 8.4 MG/DL (ref 8.7–10.5)
CHLORIDE SERPL-SCNC: 106 MMOL/L (ref 95–110)
CO2 SERPL-SCNC: 19 MMOL/L (ref 23–29)
CREAT SERPL-MCNC: 1.3 MG/DL (ref 0.5–1.4)
DIFFERENTIAL METHOD: ABNORMAL
EOSINOPHIL # BLD AUTO: 0.2 K/UL (ref 0–0.5)
EOSINOPHIL NFR BLD: 2 % (ref 0–8)
ERYTHROCYTE [DISTWIDTH] IN BLOOD BY AUTOMATED COUNT: 13.2 % (ref 11.5–14.5)
EST. GFR  (AFRICAN AMERICAN): 59.2 ML/MIN/1.73 M^2
EST. GFR  (NON AFRICAN AMERICAN): 51.2 ML/MIN/1.73 M^2
GLUCOSE SERPL-MCNC: 114 MG/DL (ref 70–110)
HCT VFR BLD AUTO: 41.3 % (ref 40–54)
HGB BLD-MCNC: 13.9 G/DL (ref 14–18)
IMM GRANULOCYTES # BLD AUTO: 0.02 K/UL (ref 0–0.04)
IMM GRANULOCYTES NFR BLD AUTO: 0.2 % (ref 0–0.5)
LYMPHOCYTES # BLD AUTO: 1.5 K/UL (ref 1–4.8)
LYMPHOCYTES NFR BLD: 18.7 % (ref 18–48)
MAGNESIUM SERPL-MCNC: 1.6 MG/DL (ref 1.6–2.6)
MCH RBC QN AUTO: 32.9 PG (ref 27–31)
MCHC RBC AUTO-ENTMCNC: 33.7 G/DL (ref 32–36)
MCV RBC AUTO: 98 FL (ref 82–98)
MONOCYTES # BLD AUTO: 0.8 K/UL (ref 0.3–1)
MONOCYTES NFR BLD: 9.5 % (ref 4–15)
NEUTROPHILS # BLD AUTO: 5.5 K/UL (ref 1.8–7.7)
NEUTROPHILS NFR BLD: 69.1 % (ref 38–73)
NRBC BLD-RTO: 0 /100 WBC
PHOSPHATE SERPL-MCNC: 2.8 MG/DL (ref 2.7–4.5)
PLATELET # BLD AUTO: 97 K/UL (ref 150–350)
PMV BLD AUTO: 10.7 FL (ref 9.2–12.9)
POTASSIUM SERPL-SCNC: 3.8 MMOL/L (ref 3.5–5.1)
PROT SERPL-MCNC: 6.1 G/DL (ref 6–8.4)
RBC # BLD AUTO: 4.23 M/UL (ref 4.6–6.2)
SODIUM SERPL-SCNC: 138 MMOL/L (ref 136–145)
WBC # BLD AUTO: 8.02 K/UL (ref 3.9–12.7)

## 2020-12-10 PROCEDURE — 92523 SPEECH SOUND LANG COMPREHEN: CPT

## 2020-12-10 PROCEDURE — 97535 SELF CARE MNGMENT TRAINING: CPT

## 2020-12-10 PROCEDURE — 99233 PR SUBSEQUENT HOSPITAL CARE,LEVL III: ICD-10-PCS | Mod: GC,,, | Performed by: PSYCHIATRY & NEUROLOGY

## 2020-12-10 PROCEDURE — 84100 ASSAY OF PHOSPHORUS: CPT

## 2020-12-10 PROCEDURE — 97802 MEDICAL NUTRITION INDIV IN: CPT

## 2020-12-10 PROCEDURE — 80053 COMPREHEN METABOLIC PANEL: CPT

## 2020-12-10 PROCEDURE — 25000003 PHARM REV CODE 250: Performed by: PHYSICIAN ASSISTANT

## 2020-12-10 PROCEDURE — 85025 COMPLETE CBC W/AUTO DIFF WBC: CPT

## 2020-12-10 PROCEDURE — 83735 ASSAY OF MAGNESIUM: CPT

## 2020-12-10 PROCEDURE — 97530 THERAPEUTIC ACTIVITIES: CPT | Mod: CQ

## 2020-12-10 PROCEDURE — 99233 SBSQ HOSP IP/OBS HIGH 50: CPT | Mod: GC,,, | Performed by: PSYCHIATRY & NEUROLOGY

## 2020-12-10 PROCEDURE — 25000003 PHARM REV CODE 250: Performed by: INTERNAL MEDICINE

## 2020-12-10 PROCEDURE — 63600175 PHARM REV CODE 636 W HCPCS: Performed by: NURSE PRACTITIONER

## 2020-12-10 PROCEDURE — 11000001 HC ACUTE MED/SURG PRIVATE ROOM

## 2020-12-10 PROCEDURE — 25000003 PHARM REV CODE 250: Performed by: NURSE PRACTITIONER

## 2020-12-10 PROCEDURE — 97116 GAIT TRAINING THERAPY: CPT | Mod: CQ

## 2020-12-10 PROCEDURE — 94761 N-INVAS EAR/PLS OXIMETRY MLT: CPT

## 2020-12-10 RX ORDER — MAGNESIUM SULFATE HEPTAHYDRATE 40 MG/ML
2 INJECTION, SOLUTION INTRAVENOUS ONCE
Status: COMPLETED | OUTPATIENT
Start: 2020-12-10 | End: 2020-12-10

## 2020-12-10 RX ORDER — TAMSULOSIN HYDROCHLORIDE 0.4 MG/1
1 CAPSULE ORAL DAILY
Status: DISCONTINUED | OUTPATIENT
Start: 2020-12-10 | End: 2020-12-14 | Stop reason: HOSPADM

## 2020-12-10 RX ADMIN — LABETALOL HCL IV SOLN PREFILLED SYRINGE 20 MG/4ML (5 MG/ML) 10 MG: 20/4 SOLUTION PREFILLED SYRINGE at 06:12

## 2020-12-10 RX ADMIN — METOPROLOL TARTRATE 25 MG: 25 TABLET, FILM COATED ORAL at 10:12

## 2020-12-10 RX ADMIN — DOCUSATE SODIUM 50MG AND SENNOSIDES 8.6MG 1 TABLET: 8.6; 5 TABLET, FILM COATED ORAL at 10:12

## 2020-12-10 RX ADMIN — TAMSULOSIN HYDROCHLORIDE 0.4 MG: 0.4 CAPSULE ORAL at 12:12

## 2020-12-10 RX ADMIN — LEVOTHYROXINE SODIUM 200 MCG: 100 TABLET ORAL at 06:12

## 2020-12-10 RX ADMIN — ATORVASTATIN CALCIUM 40 MG: 20 TABLET, FILM COATED ORAL at 09:12

## 2020-12-10 RX ADMIN — MAGNESIUM SULFATE IN WATER 2 G: 40 INJECTION, SOLUTION INTRAVENOUS at 12:12

## 2020-12-10 RX ADMIN — METOPROLOL TARTRATE 25 MG: 25 TABLET, FILM COATED ORAL at 02:12

## 2020-12-10 RX ADMIN — ACETAMINOPHEN 650 MG: 325 TABLET ORAL at 09:12

## 2020-12-10 RX ADMIN — METOPROLOL TARTRATE 25 MG: 25 TABLET, FILM COATED ORAL at 09:12

## 2020-12-10 RX ADMIN — LABETALOL HCL IV SOLN PREFILLED SYRINGE 20 MG/4ML (5 MG/ML) 10 MG: 20/4 SOLUTION PREFILLED SYRINGE at 02:12

## 2020-12-10 NOTE — ASSESSMENT & PLAN NOTE
Pt states his leg gave out when walking; wound care consulted and on board; following official note and recommendations  - PT/OT

## 2020-12-10 NOTE — PLAN OF CARE
Goals remain appropriate. ROULA Choe 12/10/2020   Problem: Occupational Therapy Goal  Goal: Occupational Therapy Goal  Description: Goals to be met by: 12/23     Patient will increase functional independence with ADLs by performing:    UE Dressing with Contact Guard Assistance.  LE Dressing (pants, brief) with Contact Guard Assistance.  Grooming while standing at sink with Contact Guard Assistance.  Toileting from toilet with Minimal Assistance for hygiene and clothing management.   Supine to sit with Contact Guard Assistance.  Toilet transfer to toilet with Contact Guard Assistance.  Functional mobility at household distance for ADL task with min(A).   Pt will verbalize s/s of stroke with teachback.   Outcome: Ongoing, Progressing

## 2020-12-10 NOTE — PROGRESS NOTES
"[facility]  Neurocritical Care  Progress Note    Admit Date: 12/9/2020  Service Date: 12/10/2020  Length of Stay: 1    Subjective:     Chief Complaint: Nontraumatic acute hemorrhage of basal ganglia    History of Present Illness: 81 yo M presents with 2.5cm R BG IPH with mild mass effect, 6mm right to left shift, and effacement of the right lateral ventricle s/p fall. Pt and bedside family report he fell this morning and was found down 2 hours after falling. Pt describes his fall as "I think my leg gave out. I was getting up from the toilet and walking back to another room and then I was just on the floor and couldn't get myself up." He denies LOC, dizziness, or syncope, and his home meds include ASA 81mg daily and fish oil 1000mg, without AC use. Coags in ED WNL. PMH includes: anemia, BPH, CAD/MI 5 years ago, GERD, hyperlipidemia, hypertension, hyperthyroidism, kidney stones, osteoarthritis, sciatica, and sinusitis. Pt is being admitted to [facility] for monitoring and management of R BG IPH.    Hospital Course: 12/9: admitted to [facility]  12/    Interval History:  NAEON. Patient's repeat CT head stable. Patient stable for transfer to floor under Vascular Neurology.    Review of Systems   Constitutional: Positive for activity change. Negative for chills, fever and unexpected weight change.   HENT: Negative for ear pain, nosebleeds and sore throat.    Respiratory: Negative for cough, shortness of breath and wheezing.    Cardiovascular: Negative for chest pain, palpitations and leg swelling.   Gastrointestinal: Negative for abdominal pain, constipation, diarrhea, nausea and vomiting.   Genitourinary: Negative for difficulty urinating, frequency and urgency.   Musculoskeletal: Negative for back pain, joint swelling and neck pain.   Skin: Negative for pallor.   Allergic/Immunologic: Negative for food allergies.   Neurological: Positive for weakness. Negative for dizziness, tremors, seizures, syncope, speech " difficulty, numbness and headaches.   Psychiatric/Behavioral: Positive for behavioral problems, confusion and hallucinations. Negative for sleep disturbance.       Objective:     Vitals:  Temp: 97.5 °F (36.4 °C)  Pulse: 73  Rhythm: normal sinus rhythm  BP: (!) 152/75  MAP (mmHg): 106  Resp: 18  SpO2: 100 %  O2 Device (Oxygen Therapy): room air    Temp  Min: 97 °F (36.1 °C)  Max: 99.1 °F (37.3 °C)  Pulse  Min: 70  Max: 91  BP  Min: 106/55  Max: 165/77  MAP (mmHg)  Min: 78  Max: 120  Resp  Min: 16  Max: 27  SpO2  Min: 91 %  Max: 100 %    12/09 0701 - 12/10 0700  In: -   Out: 350 [Urine:350]   Unmeasured Output  Urine Occurrence: 1  Stool Occurrence: 1  Pad Count: 1       Physical Exam  Constitutional:       Appearance: He is well-developed.   HENT:      Head: Normocephalic and atraumatic.   Eyes:      Conjunctiva/sclera: Conjunctivae normal.      Pupils: Pupils are equal, round, and reactive to light.   Neck:      Musculoskeletal: Normal range of motion and neck supple.   Cardiovascular:      Rate and Rhythm: Normal rate and regular rhythm.      Heart sounds: Normal heart sounds. No murmur. No friction rub. No gallop.    Pulmonary:      Effort: Pulmonary effort is normal. No respiratory distress.      Breath sounds: Normal breath sounds. No stridor. No wheezing or rales.   Chest:      Chest wall: No tenderness.   Abdominal:      General: Bowel sounds are normal. There is no distension.      Palpations: Abdomen is soft. There is no mass.      Tenderness: There is no abdominal tenderness. There is no guarding or rebound.      Hernia: No hernia is present.   Musculoskeletal: Normal range of motion.   Skin:     General: Skin is warm and dry.      Capillary Refill: Capillary refill takes less than 2 seconds.   Neurological:      Mental Status: He is alert. Mental status is at baseline. He is disoriented.      GCS: GCS eye subscore is 4. GCS verbal subscore is 4. GCS motor subscore is 6.      Cranial Nerves: No cranial  nerve deficit.      Sensory: No sensory deficit.      Motor: Weakness present. No tremor, atrophy, abnormal muscle tone or seizure activity.      Gait: Gait normal.      Deep Tendon Reflexes: Reflexes are normal and symmetric. Reflexes normal. Babinski sign absent on the right side. Babinski sign absent on the left side.      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         Thought Content: Thought content normal.         Judgment: Judgment normal.          Unable to test judgment, insight, due to level of consciousness.    Medications:  Continuous Scheduledatorvastatin, 40 mg, Daily  levothyroxine, 200 mcg, Before breakfast  metoprolol tartrate, 25 mg, TID  senna-docusate 8.6-50 mg, 1 tablet, BID  tamsulosin, 1 capsule, Daily    PRNacetaminophen, 650 mg, Q6H PRN  labetalol, 10 mg, Q4H PRN  ondansetron, 4 mg, Q8H PRN  oxyCODONE, 5 mg, Q6H PRN  sodium chloride 0.9%, 10 mL, PRN      Today I personally reviewed pertinent medications, lines/drains/airways, imaging, cardiology results, laboratory results, microbiology results, notably:    CT Head 12/08/2020    Impression:     2.5 cm right basal ganglia hemorrhage at the level of the thalamus/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  There is no given history of hypertension and correlation is needed with follow-up recommended to exclude other etiology for hemorrhage.     There is mild 6 mm right to left shift of midline structures but no significant herniation.  No evidence of intraventricular hemorrhage, hydrocephalus or other sick convincing acute finding.     Extensive white matter low density likely is related to  chronic small vessel ischemic changes.  Component of white matter infarct is not excluded.  MRI with diffusion imaging could further evaluate.  No evidence of major arterial infarction.     Atrophy and additional incidental nonacute findings as above.     This report was flagged in Epic as abnormal.     Findings were telephoned to the emergency room team at time of interpretation 9: 45.        Electronically signed by: Christen Garcia  Date:                                            12/08/2020  Time:                                           21:52            Diet  Diet Adult Regular (IDDSI Level 7)  Diet Adult Regular (IDDSI Level 7)        Assessment/Plan:     Neuro  * Nontraumatic acute hemorrhage of basal ganglia  2.5cm R BG IPH with mild mass effect, 6mm right to left shift, and effacement of the right lateral ventricle on CTH s/p fall    -on ASA 81 daily and fish oil 1000mg at home, hold in acute setting  -coags WNL on admit  -NSGY consulted, awaiting reccs  -repeat CTH pending  -avoid AC/AP in setting of bleed  -SBP <160  -PT/OT/SLP  -q1hr neuro checks     12/10/2020: NAEON. Patient's repeat CT head stable. Patient stable for transfer to floor under Vascular Neurology.          Vasogenic brain edema  -see IPH  - q1hr neuro checks  -repeat imaging if exam change    Cardiac/Vascular  Mixed hyperlipidemia  Continue statin    CAD (coronary artery disease)  -hold AS and fish oil in acute setting    History of myocardial infarction  - hx of MI 5 years ago  -CAD  - continue cardiac monitoring  -cardiac enzymes WNL on admit    Essential hypertension  SBP <160, labetalol prn    -takes metoprolol ER 100mg at home, hold ER in acute setting  -can convert to metoprolol IR 50 BID if needed    Renal/  BPH (benign prostatic hyperplasia)  Continue home tamsulosin    Oncology  Iron deficiency anemia  History of, takes iron 65 mg at home  -daily CBC    Endocrine  Hypothyroidism  - continue home  levothyroxine    Orthopedic  Fall due to stumbling  Pt states his leg gave out when walking; wound care consulted and on board; following official note and recommendations  - PT/OT          The patient is being Prophylaxed for:  Venous Thromboembolism with: Mechanical  Stress Ulcer with: Not Applicable   Ventilator Pneumonia with: not applicable    Activity Orders          Diet Adult Regular (IDDSI Level 7): Regular starting at 12/09 1716        Full Code    Rashad Ramirez MD  Neurocritical Care  [facility]

## 2020-12-10 NOTE — PT/OT/SLP PROGRESS
"Occupational Therapy   Treatment    Name: Hever Reyes Jr.  MRN: [id number]  Admitting Diagnosis:  Nontraumatic acute hemorrhage of basal ganglia    Length of Stay: 1 days  Recent Surgery: * No surgery found *      Recommendations:   Discharge Recommendations: rehabilitation facility  Discharge Equipment Recommendations:  (TBD with progression in care)  Barriers to discharge:  None    Assessment:     Hever Reyes Jr. is a 80 y.o. male with a medical diagnosis of Nontraumatic acute hemorrhage of basal ganglia.  He presents with poor insight and L hemiparesis making him a safety and fall risk. Continue to rec rehab at this time pending progression in care. Performance deficits affecting function are weakness, impaired endurance, impaired sensation, impaired self care skills, impaired functional mobilty, gait instability, decreased upper extremity function, decreased lower extremity function, impaired balance, visual deficits, decreased safety awareness, edema, impaired skin.     Rehab Prognosis:  Good; patient would benefit from acute skilled OT services to address these deficits and reach maximum level of function.       Plan:   Patient to be seen 4 x/week to address the above listed problems via self-care/home management, therapeutic activities, therapeutic exercises, neuromuscular re-education  · Plan of Care Expires: 01/08/21  · Plan of Care Reviewed with: patient    Subjective   Patient reported: "There is nothing wrong with me, I'm walking fine"  Pain/Comfort:  · Pain Rating 1: 0/10  · Pain Rating Post-Intervention 1: 0/10    Objective:   Communicated with: RN prior to session.  Patient found HOB elevated with peripheral IV, telemetry, SCD, bed alarm, blood pressure cuff upon OT entry to room.    General Precautions: Standard, aspiration, fall, seizure, vision impaired   Orthopedic Precautions:N/A   Braces: N/A     Occupational Performance:     Bed Mobility:    · Patient completed Rolling/Turning to Right with " minimum assistance  · Patient completed Supine to Sit with minimum assistance     Functional Mobility/Transfers:  · Patient completed Sit <> Stand Transfer with minimum assistance  with  hand-held assist   · Patient completed Bed <> Chair Transfer using Step Transfer technique with minimum assistance with hand-held assist  · Patient completed Toilet Transfer Step Transfer technique with minimum assistance with  hand-held assist and bedside commode  · Functional Mobility: ~6 steps to chair; ~4 to/from bedside commode with min(A) and unilateral hand held assistance  · Verbal cues for sustained attention to task    Activities of Daily Living:  · Upper Body Dressing: minimum assistance EOB to don gown as jacket  · Lower Body Dressing: minimum assistance EOB B socks  · Toileting: maximal assistance perineal care in standing; cues for divided attention with task    AMPA 6 Click ADL: 17  Body mass index is 33.42 kg/m².    Treatment & Education:  -Pt alert and agreeable to therapy session; reported orientation x3; requiring cues for situation   -edu pt/daughter on OT role in care  -EOB with SBA  -static standing with CGA  -Communication board updated; questions/concerns addressed within OT scope of practice    Patient left up in chair with all lines intact, call button in reach, chair alarm on, RN and PTA notified and daughter present    GOALS:   Multidisciplinary Problems     Occupational Therapy Goals        Problem: Occupational Therapy Goal    Goal Priority Disciplines Outcome Interventions   Occupational Therapy Goal     OT, PT/OT Ongoing, Progressing    Description: Goals to be met by: 12/23     Patient will increase functional independence with ADLs by performing:    UE Dressing with Contact Guard Assistance.  LE Dressing (pants, brief) with Contact Guard Assistance.  Grooming while standing at sink with Contact Guard Assistance.  Toileting from toilet with Minimal Assistance for hygiene and clothing management.    Supine to sit with Contact Guard Assistance.  Toilet transfer to toilet with Contact Guard Assistance.  Functional mobility at household distance for ADL task with min(A).   Pt will verbalize s/s of stroke with teachback.                    Time Tracking:     OT Date of Treatment: 12/10/20  OT Start Time: 1049  OT Stop Time: 1118  OT Total Time (min): 29 min    Billable Minutes:Self Care/Home Management 25    ROULA Choe  12/10/2020

## 2020-12-10 NOTE — PROGRESS NOTES
Pt and family were approached regarding participation in IRB protocol #2015.137.A study.  Pt and family were agreeable after discussion and concerns were addressed and pt signed the ICF willingly and independently.    The Informed Consent Form (ICF) was reviewed with pt and family. The discussion included:    - voluntary participation in study with ability to opt out at anytime    - participation in this study would not preclude pt from participating in any other research if offered    - specimens may be used by [facility] researchers, community researchers or research companies    - specimens collected include only those discussed with the patient at the time of consent and are indicated on the ICF     - specimens may be used for DNA, RNA or protein studies investigating biomarkers for diagnostic, prognostic, or treatment purposes    - 15 mL of blood specimen will be collected from the pt each day for 5 days after routine collections have been conducted    - blood specimens released to researchers will be stripped of identifiers    - Adherence to HIPAA policy and [facility] confidentiality/privacy policy; personal medical information would not be disclosed to parties outside this research study     - there will be no other physical risks outside of those involved in standard of care procedure       A copy of signed ICF was given to pt's daughter and placed in the pts medical binder with instructions to call with any questions that may arise or if they wish to opt out of the study.

## 2020-12-10 NOTE — ED NOTES
Pt had episode of fecal incontinence after getting out of bed. Pt cleaned. Linens changed. New condom cath applied. Pt NAD. EVS at bedside cleaning floor. Pt resting comfortably in bed with side rails up x2. Pt has stable gait when ambulating around room.

## 2020-12-10 NOTE — PLAN OF CARE
[facility] Care Plan    POC reviewed with Hever YBARRA Amy Werner and family at 0300. Pt verbalized understanding. Questions and concerns addressed. No acute events overnight. Pt progressing toward goals. Will continue to monitor. See below and flowsheets for full assessment and VS info.       Neuro:  [place] Coma Scale  Best Eye Response: 4-->(E4) spontaneous  Best Motor Response: 6-->(M6) obeys commands  Best Verbal Response: 5-->(V5) oriented  [place] Coma Scale Score: 15  Assessment Qualifiers: patient not sedated/intubated        24hr Temp:  [98.5 °F (36.9 °C)-99.1 °F (37.3 °C)]     CV:   Rhythm: normal sinus rhythm  BP goals:   SBP < 140  MAP > 65    Resp:   O2 Device (Oxygen Therapy): room air       Plan: N/A    GI/:     Diet/Nutrition Received: regular  Last Bowel Movement: 12/09/20  Voiding Characteristics: external catheter    Intake/Output Summary (Last 24 hours) at 12/10/2020 0634  Last data filed at 12/10/2020 0600  Gross per 24 hour   Intake --   Output 350 ml   Net -350 ml     Unmeasured Output  Urine Occurrence: 1  Stool Occurrence: 1    Labs/Accuchecks:  Recent Labs   Lab 12/10/20  0055   WBC 8.02   RBC 4.23*   HGB 13.9*   HCT 41.3   PLT 97*      Recent Labs   Lab 12/10/20  0055      K 3.8   CO2 19*      BUN 19   CREATININE 1.3   ALKPHOS 77   ALT 27   AST 54*   BILITOT 1.6*      Recent Labs   Lab 12/08/20 2104   INR 1.1   APTT 24.8      Recent Labs   Lab 12/08/20 2104 12/09/20  0233   CPK  --  710*   CPKMB  --  4.4   TROPONINI <0.01  --    MB  --  0.6       Electrolytes: No replacement orders  Accuchecks: none    Gtts:       LDA/Wounds:  Lines/Drains/Airways       Drain              Male External Urinary Catheter 12/09/20 2040 Small less than 1 day              Peripheral Intravenous Line                   Peripheral IV - Single Lumen 12/08/20 20 G Left Hand 2 days         Peripheral IV - Single Lumen 12/09/20 0249 20 G Left Forearm 1 day                  Wounds: Yes  Wound care consulted: Yes

## 2020-12-10 NOTE — HOSPITAL COURSE
12/10: NAEON; patient neuro exam stable; plan to step down to vascular neurology today  12/11: Patient stepped down overnight. BP elevated - switched metoprolol to home dose and started Norvasc 5 mg. Will titrate up as needed. Waiting on rehab placement.   12/12: NAEON. Increasing Norvasc to 10 mg. Pending rehab placement.   12/13: Patient reports unable to sleep at night - melatonin nightly ordered.   12/14: No acute events overnight, some complaints of headache this morning resolved with PRN Tylenol. To be discharged to inpatient rehab facility today.

## 2020-12-10 NOTE — PT/OT/SLP PROGRESS
Physical Therapy Treatment    Patient Name:  Hever Reyes Jr.   MRN:  [id number]    Recommendations:     Discharge Recommendations:  rehabilitation facility   Discharge Equipment Recommendations: other (see comments)(TBD)   Barriers to discharge: Inaccessible home    Assessment:     Hever Reyes Jr. is a 80 y.o. male admitted with a medical diagnosis of Nontraumatic acute hemorrhage of basal ganglia.  He presents with the following impairments/functional limitations:  weakness, impaired endurance, impaired self care skills, impaired functional mobilty, gait instability, impaired balance, decreased safety awareness, impaired cardiopulmonary response to activity . Patient continues to progress well towards his goals, as walking range was only limited due to multiple medical lines. Patient is impulsive and restless at times, but showed no shortness of breath or signs of distress with functional mobility.    Rehab Prognosis: Good; patient would benefit from acute skilled PT services to address these deficits and reach maximum level of function.    Recent Surgery: * No surgery found *      Plan:     During this hospitalization, patient to be seen 4 x/week to address the identified rehab impairments via gait training, therapeutic activities, therapeutic exercises, neuromuscular re-education and progress toward the following goals:    · Plan of Care Expires:  12/23/20    Subjective     Chief Complaint: fatigue  Patient/Family Comments/goals: To take him home with my brother with home health.  Pain/Comfort:  · Pain Rating 1: 0/10  · Pain Rating Post-Intervention 1: 0/10      Objective:     Communicated with NSG prior to session.  Patient found HOB elevated with bed alarm, blood pressure cuff, central line, telemetry, pulse ox (continuous), peripheral IV, SCD upon PT entry to room.     General Precautions: Standard, aspiration, fall, seizure, vision impaired   Orthopedic Precautions:N/A   Braces: N/A     Functional  Mobility:  · Bed Mobility:     · Rolling Right: contact guard assistance  · Scooting: minimum assistance  · Supine to Sit: minimum assistance  · Sit to Supine: minimum assistance  · Transfers:     · Sit to Stand:  minimum assistance with hand-held assist  · Gait: 9 ft fwd/bwd x 6 trials with L HHA with min assistance.      AM-PAC 6 CLICK MOBILITY  Turning over in bed (including adjusting bedclothes, sheets and blankets)?: 3  Sitting down on and standing up from a chair with arms (e.g., wheelchair, bedside commode, etc.): 3  Moving from lying on back to sitting on the side of the bed?: 3  Moving to and from a bed to a chair (including a wheelchair)?: 3  Need to walk in hospital room?: 3  Climbing 3-5 steps with a railing?: 2  Basic Mobility Total Score: 17       Therapeutic Activities and Exercises:   Patient sat at EOB to prepare for treatment. Treatment interrupted by medical team.    Patient left HOB elevated with all lines intact, call button in reach, bed alarm on and daughter present..    GOALS:   Multidisciplinary Problems     Physical Therapy Goals        Problem: Physical Therapy Goal    Goal Priority Disciplines Outcome Goal Variances Interventions   Physical Therapy Goal     PT, PT/OT Ongoing, Progressing     Description: Goals to be met by: 2020    Patient will increase functional independence with mobility by performin. Supine to sit with supervision  2. Sit to stand transfer with Supervision  3. Gait x250 feet with Supervision    4. Lower extremity exercise program x30 reps, with supervision                     Time Tracking:     PT Received On: 12/10/20  PT Start Time: 1348     PT Stop Time: 1414  PT Total Time (min): 26 min     Billable Minutes: Gait Training 13 and Therapeutic Activity 13    Treatment Type: Treatment  PT/PTA: PTA     PTA Visit Number: Reyes Damian PTA  12/10/2020

## 2020-12-10 NOTE — PLAN OF CARE
Admit Date:  12/9/2020  1:10 AM      Admit Diagnosis:  ICH (intracerebral hemorrhage) [I61.9]  Traumatic hemorrhage of cerebrum without loss of consciousness, unspecified laterality, initial encounter [S06.360A]  Nontraumatic subcortical hemorrhage of cerebral hemisphere, unspecified laterality [I61.0]    Transferred from:  [facility]    Past Medical History:   Diagnosis Date    Anemia     BPH (benign prostatic hyperplasia)     Coronary artery disease     GERD (gastroesophageal reflux disease)     HLD (hyperlipidemia)     Hypertension     Hyperthyroidism     Nephrolithiasis     Osteoarthritis     Sciatica     Sinusitis          CM met with patient and daughter, Fouzia Conklin,  in room for Discharge Planning Assessment.  Patient is sleeping and unable to answer questions.  Per daughter, the patient usually lives alone, but stays with his son and daughter in law frequently.  Per daughter, the patient's son's home is a single story house with no step(s) to enter.  Patient will discharge to his son's home in [facility].   Per daughter, the patient was independent with ADLS and used no dme for ambulation.  Patient will have assistance from his son and daughter in law upon discharge.   Discharge Planning Booklet given to patient/family and discussed.  All questions addressed.  CM will follow for needs.       12/10/20 1200   Discharge Assessment   Assessment Type Discharge Planning Assessment   Confirmed/corrected address and phone number on facesheet? Yes   Assessment information obtained from? Caregiver  (daughter, Fouzia Conklin, in room)   Expected Length of Stay (days) 4   Communicated expected length of stay with patient/caregiver no   Prior to hospitilization cognitive status: Alert/Oriented   Prior to hospitalization functional status: Independent   Current cognitive status: Unable to Assess   Current Functional Status: Needs Assistance   Facility Arrived From: [facility]   Lives With child(almas),  adult;grandchild(almas)  (son, daughter in law and 3 grandchildren, 14,12, and 10)   Able to Return to Prior Arrangements yes   Is patient able to care for self after discharge? Unable to determine at this time (comments)   Who are your caregiver(s) and their phone number(s)? Hever Reyes III, son, [phone], Fouzia Rubin (dtr) [phone]   Patient's perception of discharge disposition rehab facility   Readmission Within the Last 30 Days no previous admission in last 30 days   Patient currently being followed by outpatient case management? No   Patient currently receives any other outside agency services? No   Equipment Currently Used at Home none   Do you have any problems affording any of your prescribed medications? No   Is the patient taking medications as prescribed? yes   Does the patient have transportation home? Yes   Transportation Anticipated family or friend will provide   Does the patient receive services at the [facility]? No   Discharge Plan A Rehab   Discharge Plan B Home Health   DME Needed Upon Discharge  other (see comments)  (tbd)   Patient/Family in Agreement with Plan yes   Does the patient have transportation to healthcare appointments? Yes                PCP:  Hever Carvalho MD  [phone]        [facility] [address] [place] [address] [address]  [address] [facility] [address]  Phone: [phone] Fax: [phone]        Emergency Contacts:  Extended Emergency Contact Information  Primary Emergency Contact: Hever Reyes III  Address: [address] [place] [address]  Mobile Phone: [phone]  Relation: Son  Secondary Emergency Contact: Fouzia Conklin  Mobile Phone: [phone]  Relation: Daughter      Insurance:    Payor: [employer] / Plan: [employer] 65 / Product Type: Medicare Advantage /     Ivelisse Gardner RN, CCRN-K, [facility]  Neuro-Critical Care Case  Manager  [phone]    12/10/2020  12:08 PM

## 2020-12-10 NOTE — NURSING
Patient arrived to [facility] from [facility]    Type of stroke/diagnosis:  Traumatic hemorrhage of basal ganglia    TPA start and end time N/A    Thrombectomy start and end time N/A    Current symptoms: left side weakness, left facial droop, intermittently confused to time and place    Skin assessment done: Yes  Wounds noted: left knee abrasion, bruises on back, skin tear on left elbow  JASPAL Armband Applied: yes  Patient Belongings on Admit: black suitcase ( 3 pair of underwear 2 blues and 1 black and white, daily pill baldwin with day and night, 3 white undertshirts, 3 pajama pants haile, grey w Amauri Ross and blue; 6 pairs of white socks), brown pants, brown belt, plaid underwear, white socks x2, navy t shirt    NCC notified: Fara STRONG

## 2020-12-10 NOTE — PROGRESS NOTES
[facility]  Vascular Neurology  Comprehensive Stroke Center  Progress Note    Assessment/Plan:     * Nontraumatic acute hemorrhage of basal ganglia  Hever Reyes Jr. is a 80 y.o. male with a significant medical history of BPH, GERD, HLD, HTN, OA, CAD, and hypothyroidism who presents to the hospital as a transfer from [facility] for evaluation of ICH.  He states his leg gave out causing him to fall and he was unable to get up without assistance.  A CTH revealed a right basal ganglia hemorrhage. CTA H&N showed R basal ganglia changes but was otherwise unremarkable. Suspected etiology is hypertensive.    Antithrombotics for secondary stroke prevention: Antiplatelets: None: Intracerebral Hemorrhage    Statins for secondary stroke prevention and hyperlipidemia, if present:   Statins: Atorvastatin- 20 mg daily    Aggressive risk factor modification: HTN, HLD, CAD     Rehab efforts: The patient has been evaluated by a stroke team provider and the therapy needs have been fully considered based off the presenting complaints and exam findings. The following therapy evaluations are needed: PT evaluate and treat, OT evaluate and treat, SLP evaluate and treat, PM&R evaluate for appropriate placement    Diagnostics ordered/pending: MRI head w/ without contrast to assess brain parenchyma    VTE prophylaxis: Mechanical prophylaxis: Place SCDs  None: Reason for No Pharmacological VTE Prophylaxis: History of systemic or intracranial bleeding    BP parameters: ICH: SBP <140        Mixed hyperlipidemia  -Stroke risk factor.  The patient is prescribed atorvastatin 80 mg daily.  -LDL 31.6.  -Recommend atorvastatin 20 mg daily    Vasogenic brain edema  -Moderate area of vasogenic cerebral edema identified when reviewing brain imaging in the territory of the R middle cerebral artery. There is mass effect associated with it. We will continue to monitor the patients clinical exam for any worsening of  symptoms which may indicate expansion of the stroke or the area of the edema resulting in the clinical change. The pattern is suggestive of hypertensive vs brain mass etiology.  -Neurosurgery following          CAD (coronary artery disease)  Stroke risk factor.  Hold ASA for now 2/2 ICH.    Hypothyroidism  -Stroke risk factor.  Continue home levothyroxine.    Essential hypertension  -Stroke risk factor.  SBP<140.         12/10: NAEON; patient neuro exam stable; plan to step down to vascular neurology today    STROKE DOCUMENTATION        NIH Scale:  1a. Level of Consciousness: 0-->Alert, keenly responsive  1b. LOC Questions: 0-->Answers both questions correctly  1c. LOC Commands: 0-->Performs both tasks correctly  2. Best Gaze: 1-->Partial gaze palsy, gaze is abnormal in one or both eyes, but forced deviation or total gaze paresis is not present  3. Visual: 0-->No visual loss  4. Facial Palsy: 0-->Normal symmetrical movements  5a. Motor Arm, Left: 0-->No drift, limb holds 90 (or 45) degrees for full 10 secs  5b. Motor Arm, Right: 0-->No drift, limb holds 90 (or 45) degrees for full 10 secs  6a. Motor Leg, Left: 0-->No drift, leg holds 30 degree position for full 5 secs  6b. Motor Leg, Right: 0-->No drift, leg holds 30 degree position for full 5 secs  7. Limb Ataxia: 0-->Absent  8. Sensory: 0-->Normal, no sensory loss  9. Best Language: 0-->No aphasia, normal  10. Dysarthria: 0-->Normal  11. Extinction and Inattention (formerly Neglect): 0-->No abnormality  Total (NIH Stroke Scale): 1       Modified [place] Score: 1  [place] Coma Scale:15   ABCD2 Score:    [id number] Score:   HAS -BLED Score:   ICH Score:0  Hunt & Banegas Classification:      Hemorrhagic change of an Ischemic Stroke: Does this patient have an ischemic stroke with hemorrhagic changes? No     Neurologic Chief Complaint: R Basal Ganglia Hemorrhage    Subjective:     Interval History: Patient is seen for follow-up neurological assessment and treatment  recommendations: NUBIAON; patient neuro exam stable; plan to step down to vascular neurology today    HPI, Past Medical, Family, and Social History remains the same as documented in the initial encounter.      Review of Systems   Constitutional: Negative for chills, fatigue and fever.   HENT: Negative for drooling, facial swelling and trouble swallowing.    Eyes: Negative for photophobia, pain and visual disturbance.   Respiratory: Negative for cough, chest tightness, shortness of breath and wheezing.    Cardiovascular: Negative for chest pain, palpitations and leg swelling.   Gastrointestinal: Negative for abdominal pain, constipation, diarrhea, nausea and vomiting.   Endocrine: Negative for cold intolerance and heat intolerance.   Genitourinary: Negative for dysuria and hematuria.   Musculoskeletal: Positive for gait problem. Negative for neck pain and neck stiffness.   Skin: Negative for rash and wound.   Allergic/Immunologic: Negative for environmental allergies and food allergies.   Neurological: Positive for weakness. Negative for dizziness, tremors, light-headedness, numbness and headaches.   Hematological: Negative for adenopathy. Does not bruise/bleed easily.   Psychiatric/Behavioral: Negative for agitation, confusion and hallucinations.     Objective:     Vital Signs (Most Recent):  Temp: 97 °F (36.1 °C) (12/10/20 1102)  Pulse: 76 (12/10/20 1302)  Resp: 20 (12/10/20 1302)  BP: (!) 106/55 (12/10/20 1302)  SpO2: 100 % (12/10/20 1302)    Vital Signs Range (Last 24H):  Temp:  [97 °F (36.1 °C)-99.1 °F (37.3 °C)]   Pulse:  [70-91]   Resp:  [16-27]   BP: (106-165)/(55-78)   SpO2:  [91 %-100 %]     Physical Exam  Vitals signs and nursing note reviewed.   Constitutional:       General: He is not in acute distress.     Appearance: He is well-developed. He is not diaphoretic.   HENT:      Head: Normocephalic and atraumatic.      Right Ear: External ear normal.      Left Ear: External ear normal.      Nose: Nose normal.       Mouth/Throat:      Mouth: Mucous membranes are moist.   Eyes:      General: No scleral icterus.        Right eye: No discharge.         Left eye: No discharge.      Extraocular Movements:      Right eye: Abnormal extraocular motion (chronic since childhood) present.      Left eye: Abnormal extraocular motion (chronic since childhood) present.      Conjunctiva/sclera: Conjunctivae normal.      Pupils:      Right eye: Pupil is not round.   Neck:      Musculoskeletal: Normal range of motion and neck supple.   Cardiovascular:      Rate and Rhythm: Normal rate and regular rhythm.   Pulmonary:      Effort: Pulmonary effort is normal. No respiratory distress.      Breath sounds: Normal breath sounds.   Abdominal:      General: Bowel sounds are normal. There is no distension.      Palpations: Abdomen is soft.      Tenderness: There is no abdominal tenderness.   Skin:     General: Skin is warm and dry.      Capillary Refill: Capillary refill takes less than 2 seconds.   Neurological:      Mental Status: He is alert and oriented to person, place, and time.      Motor: Weakness present.         Neurological Exam:   LOC: alert  Attention Span: Good   Language: No aphasia  Articulation: No dysarthria  Orientation: Person, Place, Time   Facial Movement (CN VII): Symmetric facial expression    Motor: Arm left  Normal 5/5  Leg left  Paresis: 4/5  Arm right  Normal 5/5  Leg right Normal 5/5  Sensation: Intact to light touch, temperature and vibration      Laboratory:  CMP:   Recent Labs   Lab 12/10/20  0055   CALCIUM 8.4*   ALBUMIN 3.3*   PROT 6.1      K 3.8   CO2 19*      BUN 19   CREATININE 1.3   ALKPHOS 77   ALT 27   AST 54*   BILITOT 1.6*     CBC:   Recent Labs   Lab 12/10/20  0055   WBC 8.02   RBC 4.23*   HGB 13.9*   HCT 41.3   PLT 97*   MCV 98   MCH 32.9*   MCHC 33.7     Lipid Panel:   Recent Labs   Lab 12/09/20  0233   CHOL 71*   LDLCALC 31.6*   HDL 27*   TRIG 62     Coagulation:   Recent Labs   Lab  12/08/20  2104   INR 1.1   APTT 24.8     Hgb A1C:   Recent Labs   Lab 12/09/20  0233   HGBA1C 5.2     TSH:   Recent Labs   Lab 12/09/20  0233   TSH 0.184*       Diagnostic Results:      Brain imaging:  CTH 12/8/2020   2.5 cm right basal ganglia hemorrhage at the level of the thalamus/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  There is no given history of hypertension and correlation is needed with follow-up recommended to exclude other etiology for hemorrhage.  There is mild 6 mm right to left shift of midline structures but no significant herniation.  No evidence of intraventricular hemorrhage, hydrocephalus or other sick convincing acute finding.  Extensive white matter low density likely is related to chronic small vessel ischemic changes. Component of white matter infarct is not excluded.  MRI with diffusion imaging could further evaluate.  No evidence of major arterial infarction.  Atrophy and additional incidental nonacute findings as above.      Vessel Imaging:  CTA Head and Neck 12/9/20  Right thalamic parenchymal hemorrhage with no new or worsening regional mass effect compared to 12/08/2020 head CT.  No new hemorrhage is elsewhere.     Unremarkable CTA of the head and neck.    Cardiac Evaluation:   TTE 12/9/20  · The left ventricle is normal in size with normal systolic function. The estimated ejection fraction is 65%.  · Normal left ventricular diastolic function.  · Normal right ventricular size with normal right ventricular systolic function.  · Normal central venous pressure (3 mmHg).  · The estimated PA systolic pressure is 29 mmHg.      Modesto Murcia MD  Comprehensive Stroke Center  Department of Vascular Neurology   [facility]

## 2020-12-10 NOTE — PLAN OF CARE
Recommendations    Recommendation:   1. Continue regular diet, encoruage good PO intake at meals times   2. Add boost plus if PO <50% of meals   3. RD to monitor and follow up    Goals: pt to tolerate >85% of EEN/EPN by RD follow up  Nutrition Goal Status: new  Communication of RD Recs: other (comment)(POC)

## 2020-12-10 NOTE — CONSULTS
Wound care consulted for Left knee and left elbow  PMH:  Nontraumatic acute hemorrhage of vasal ganglia, fall due to stumbling, HTn, h/0 MI, CAD, BPH, vasogenic brain edema, multipe falls and found down at home  Assessment:  The patient is sitting up at bedside, needs verbal redirection  The left elbow has a partial thickness abrasion, moist/white wound bed.  The left knee has dry scabbed over abrasions. The distal knee has a swollen area in a crescent shape from mid knee to lateral     The sacral area/gluteal cleft is red/blanches, skin intact with tenderness.   Recommendations:  Left elbow- nursing to use standing orders- foam dressing over area, change weekly/ prn until resolved  Left knee- leave open to air  Sacral spine/gluteal cleft- barrier cream bid/prn  Nursing to continue pressure prevention measures and care  Wound care signing off- please reconsult if needed.    FORREST Gray RN, CWCN  [id number]     Left elbow abrasion    Left knee abrasion    Left distal knee swelling

## 2020-12-10 NOTE — ASSESSMENT & PLAN NOTE
-Moderate area of vasogenic cerebral edema identified when reviewing brain imaging in the territory of the R middle cerebral artery. There is mass effect associated with it. We will continue to monitor the patients clinical exam for any worsening of symptoms which may indicate expansion of the stroke or the area of the edema resulting in the clinical change. The pattern is suggestive of hypertensive vs brain mass etiology.  -Neurosurgery following

## 2020-12-10 NOTE — PROGRESS NOTES
[facility]  Neurosurgery  Progress Note    Subjective:     History of Present Illness: Pt is 80yom w pmh CAD, HTN, hyperthyroid, HLD who presents with multipke falls and recent fall thi morning found down after getting out of bed where he lost balance and strength in his legs, denies LOC. Pt AOx4 and states he feelse generalized weakness. CTH showed acute R BG hemorhhage 2.5cm in size. NSGY consulted for evaluation.        Post-Op Info:  * No surgery found *         Interval History: NAEON. Exam stable. CTA negative.     Medications:  Continuous Infusions:  Scheduled Meds:   atorvastatin  40 mg Oral Daily    levothyroxine  200 mcg Oral Before breakfast    metoprolol tartrate  25 mg Oral TID    senna-docusate 8.6-50 mg  1 tablet Oral BID     PRN Meds:acetaminophen, labetalol, ondansetron, oxyCODONE, sodium chloride 0.9%     Review of Systems  Objective:     Weight: 91.1 kg (200 lb 13.4 oz)  Body mass index is 33.42 kg/m².  Vital Signs (Most Recent):  Temp: 99.1 °F (37.3 °C) (12/10/20 0302)  Pulse: 91 (12/10/20 0902)  Resp: 18 (12/10/20 0902)  BP: (!) 144/69 (12/10/20 0902)  SpO2: 100 % (12/10/20 0902) Vital Signs (24h Range):  Temp:  [98.5 °F (36.9 °C)-99.1 °F (37.3 °C)] 99.1 °F (37.3 °C)  Pulse:  [70-92] 91  Resp:  [16-27] 18  SpO2:  [91 %-100 %] 100 %  BP: (128-165)/(56-83) 144/69     Date 12/10/20 0700 - 12/11/20 0659   Shift 8792-2876 1948-6054 9106-9325 24 Hour Total   INTAKE   P.O. 250   250   Shift Total(mL/kg) 250(2.7)   250(2.7)   OUTPUT   Shift Total(mL/kg)       Weight (kg) 91.1 91.1 91.1 91.1                   Male External Urinary Catheter 12/09/20 2040 Small (Active)   Collection Container Urimeter 12/10/20 0302   Securement Method secured to top of thigh w/ tape 12/10/20 0302   Skin no redness;no breakdown 12/10/20 0302   Tolerance no signs/symptoms of discomfort 12/10/20 0302   Catheter Change Date 12/09/20 12/10/20 0302   Catheter Change Time 2040 12/10/20 0302        Neurosurgery Physical Exam   AAOx4  CN grossly intact  Pupils reactive 2mm,  coloboma R eye   L gaze preference in left eye baseline, facial droop  Follows commands x4  4/5 LUE with drift o/w 5/5  SILT    Significant Labs:  Recent Labs   Lab 12/08/20  2104 12/09/20 0233 12/10/20  0055    116* 114*   * 138 138   K 3.9 4.0 3.8   * 105 106   CO2 21* 22* 19*   BUN 17 16 19   CREATININE 1.3 1.2 1.3   CALCIUM 9.2 9.1 8.4*   MG  --  1.5* 1.6     Recent Labs   Lab 12/08/20  2104 12/09/20  0233 12/10/20  0055   WBC 12.50 10.77 8.02   HGB 15.0 13.7* 13.9*   HCT 43.4 40.8 41.3   * 109* 97*     Recent Labs   Lab 12/08/20 2104   INR 1.1   APTT 24.8       Significant Diagnostics:  No results found in the last 24 hours.      Assessment/Plan:     * Nontraumatic acute hemorrhage of basal ganglia  Pt is 80yom w pmh CAD, HTN, hyperthyroid, HLD who presents with multipke falls and recent fall thi morning found down after getting out of bed where he lost balance and strength in his legs, denies LOC. Pt AOx4 and states he feelse generalized weakness. CTH showed acute R BG hemorhhage 2.5cm in size. NSGY consulted for evaluation.     Plan:  Admit NCC  Q1hr neurochecks  CTA negative  HOB>30  SBP<140  Na 135-150  Keppra  VN consultation  PT/OT  Stable for stepdown to VN from NSGY perspective. No neurosurgical intervention required.             Marylou Valera MD  Neurosurgery  [facility]

## 2020-12-10 NOTE — SUBJECTIVE & OBJECTIVE
Interval History:  NAEON. Patient's repeat CT head stable. Patient stable for transfer to floor under Vascular Neurology.    Review of Systems   Constitutional: Positive for activity change. Negative for chills, fever and unexpected weight change.   HENT: Negative for ear pain, nosebleeds and sore throat.    Respiratory: Negative for cough, shortness of breath and wheezing.    Cardiovascular: Negative for chest pain, palpitations and leg swelling.   Gastrointestinal: Negative for abdominal pain, constipation, diarrhea, nausea and vomiting.   Genitourinary: Negative for difficulty urinating, frequency and urgency.   Musculoskeletal: Negative for back pain, joint swelling and neck pain.   Skin: Negative for pallor.   Allergic/Immunologic: Negative for food allergies.   Neurological: Positive for weakness. Negative for dizziness, tremors, seizures, syncope, speech difficulty, numbness and headaches.   Psychiatric/Behavioral: Positive for behavioral problems, confusion and hallucinations. Negative for sleep disturbance.       Objective:     Vitals:  Temp: 97.5 °F (36.4 °C)  Pulse: 73  Rhythm: normal sinus rhythm  BP: (!) 152/75  MAP (mmHg): 106  Resp: 18  SpO2: 100 %  O2 Device (Oxygen Therapy): room air    Temp  Min: 97 °F (36.1 °C)  Max: 99.1 °F (37.3 °C)  Pulse  Min: 70  Max: 91  BP  Min: 106/55  Max: 165/77  MAP (mmHg)  Min: 78  Max: 120  Resp  Min: 16  Max: 27  SpO2  Min: 91 %  Max: 100 %    12/09 0701 - 12/10 0700  In: -   Out: 350 [Urine:350]   Unmeasured Output  Urine Occurrence: 1  Stool Occurrence: 1  Pad Count: 1       Physical Exam  Constitutional:       Appearance: He is well-developed.   HENT:      Head: Normocephalic and atraumatic.   Eyes:      Conjunctiva/sclera: Conjunctivae normal.      Pupils: Pupils are equal, round, and reactive to light.   Neck:      Musculoskeletal: Normal range of motion and neck supple.   Cardiovascular:      Rate and Rhythm: Normal rate and regular rhythm.      Heart sounds:  Normal heart sounds. No murmur. No friction rub. No gallop.    Pulmonary:      Effort: Pulmonary effort is normal. No respiratory distress.      Breath sounds: Normal breath sounds. No stridor. No wheezing or rales.   Chest:      Chest wall: No tenderness.   Abdominal:      General: Bowel sounds are normal. There is no distension.      Palpations: Abdomen is soft. There is no mass.      Tenderness: There is no abdominal tenderness. There is no guarding or rebound.      Hernia: No hernia is present.   Musculoskeletal: Normal range of motion.   Skin:     General: Skin is warm and dry.      Capillary Refill: Capillary refill takes less than 2 seconds.   Neurological:      Mental Status: He is alert. Mental status is at baseline. He is disoriented.      GCS: GCS eye subscore is 4. GCS verbal subscore is 4. GCS motor subscore is 6.      Cranial Nerves: No cranial nerve deficit.      Sensory: No sensory deficit.      Motor: Weakness present. No tremor, atrophy, abnormal muscle tone or seizure activity.      Gait: Gait normal.      Deep Tendon Reflexes: Reflexes are normal and symmetric. Reflexes normal. Babinski sign absent on the right side. Babinski sign absent on the left side.      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         Thought Content: Thought content normal.         Judgment: Judgment normal.          Unable to test judgment, insight, due to level of consciousness.    Medications:  Continuous Scheduledatorvastatin, 40 mg, Daily  levothyroxine, 200 mcg, Before breakfast  metoprolol tartrate, 25 mg, TID  senna-docusate 8.6-50 mg, 1 tablet, BID  tamsulosin, 1 capsule,  Daily    PRNacetaminophen, 650 mg, Q6H PRN  labetalol, 10 mg, Q4H PRN  ondansetron, 4 mg, Q8H PRN  oxyCODONE, 5 mg, Q6H PRN  sodium chloride 0.9%, 10 mL, PRN      Today I personally reviewed pertinent medications, lines/drains/airways, imaging, cardiology results, laboratory results, microbiology results, notably:    CT Head 12/08/2020    Impression:     2.5 cm right basal ganglia hemorrhage at the level of the thalamus/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  There is no given history of hypertension and correlation is needed with follow-up recommended to exclude other etiology for hemorrhage.     There is mild 6 mm right to left shift of midline structures but no significant herniation.  No evidence of intraventricular hemorrhage, hydrocephalus or other sick convincing acute finding.     Extensive white matter low density likely is related to chronic small vessel ischemic changes.  Component of white matter infarct is not excluded.  MRI with diffusion imaging could further evaluate.  No evidence of major arterial infarction.     Atrophy and additional incidental nonacute findings as above.     This report was flagged in Epic as abnormal.     Findings were telephoned to the emergency room team at time of interpretation 9: 45.        Electronically signed by: Christen Garcia  Date:                                            12/08/2020  Time:                                           21:52            Diet  Diet Adult Regular (IDDSI Level 7)  Diet Adult Regular (IDDSI Level 7)

## 2020-12-10 NOTE — CONSULTS
"  [facility]  Adult Nutrition  Consult Note    SUMMARY     Recommendations    Recommendation:   1. Continue regular diet, encoruage good PO intake at meals times   2. Add boost plus if PO <50% of meals   3. RD to monitor and follow up    Goals: pt to tolerate >85% of EEN/EPN by RD follow up  Nutrition Goal Status: new  Communication of RD Recs: other (comment)(POC)    Reason for Assessment    Reason For Assessment: consult  Diagnosis: (ICH)  Relevant Medical History: HTN; anemia; CAD; HLD; GERD  Interdisciplinary Rounds: did not attend  General Information Comments: Pt eating bfst at time of visit. Reported good PO intake at this time and good PO PTA. Denies use of ONS prior to admit. No recent wt loss reported per pt, UBW ~208 lbs. Wt stable. NFPE not indicated at this time, pt with no s/s of malnutrition. Will monitor.  Nutrition Discharge Planning: adequate intake via PO diet    Nutrition Risk Screen    Nutrition Risk Screen: no indicators present    Nutrition/Diet History    Spiritual, Cultural Beliefs, Advent Practices, Values that Affect Care: no  Food Allergies: NKFA  Factors Affecting Nutritional Intake: None identified at this time    Anthropometrics    Temp: 97 °F (36.1 °C)  Height Method: Stated  Height: 5' 5" (165.1 cm)  Height (inches): 65 in  Weight Method: Bed Scale  Weight: 91.1 kg (200 lb 13.4 oz)  Weight (lb): 200.84 lb  Ideal Body Weight (IBW), Male: 136 lb  % Ideal Body Weight, Male (lb): 147.68 %  BMI (Calculated): 33.4  BMI Grade: 30 - 34.9- obesity - grade I    Lab/Procedures/Meds    Pertinent Labs Reviewed: reviewed  Pertinent Labs Comments: Glucose 114; Ca 8.4  Pertinent Medications Reviewed: reviewed  Pertinent Medications Comments: statin; levothyroxine; metoprolol; magnesium sulfate     Estimated/Assessed Needs    Weight Used For Calorie Calculations: 91.1 kg (200 lb 13.4 oz)  Energy Calorie Requirements (kcal): 1542 kcal/d  Energy Need Method: [place]  Protein Requirements:  g/day  Weight Used For Protein Calculations: 91.1 kg (200 lb 13.4 oz)  Fluid Requirements (mL): 1 mL/kcal or per MD  RDA Method (mL): 1542    Nutrition Prescription Ordered    Current Diet Order: Regular diet    Evaluation of Received Nutrient/Fluid Intake    I/O: -.3 L since admit  Energy Calories Required: meeting needs  Protein Required: meeting needs  Fluid Required: meeting needs  Comments: LBM 12/9  Tolerance: tolerating  % Intake of Estimated Energy Needs: 75 - 100 %  % Meal Intake: 75 - 100 %    Nutrition Risk    Level of Risk/Frequency of Follow-up: low     Assessment and Plan    No nutrition dx at this time     Monitor and Evaluation    Food and Nutrient Intake: energy intake, food and beverage intake  Food and Nutrient Adminstration: diet order  Knowledge/Beliefs/Attitudes: food and nutrition knowledge/skill  Anthropometric Measurements: weight, weight change  Biochemical Data, Medical Tests and Procedures: electrolyte and renal panel, gastrointestinal profile, glucose/endocrine profile, inflammatory profile, lipid profile  Nutrition-Focused Physical Findings: overall appearance     Nutrition Follow-Up    RD Follow-up?: Yes

## 2020-12-10 NOTE — SUBJECTIVE & OBJECTIVE
Interval History: NAEON. Exam stable. CTA negative.     Medications:  Continuous Infusions:  Scheduled Meds:   atorvastatin  40 mg Oral Daily    levothyroxine  200 mcg Oral Before breakfast    metoprolol tartrate  25 mg Oral TID    senna-docusate 8.6-50 mg  1 tablet Oral BID     PRN Meds:acetaminophen, labetalol, ondansetron, oxyCODONE, sodium chloride 0.9%     Review of Systems  Objective:     Weight: 91.1 kg (200 lb 13.4 oz)  Body mass index is 33.42 kg/m².  Vital Signs (Most Recent):  Temp: 99.1 °F (37.3 °C) (12/10/20 0302)  Pulse: 91 (12/10/20 0902)  Resp: 18 (12/10/20 0902)  BP: (!) 144/69 (12/10/20 0902)  SpO2: 100 % (12/10/20 0902) Vital Signs (24h Range):  Temp:  [98.5 °F (36.9 °C)-99.1 °F (37.3 °C)] 99.1 °F (37.3 °C)  Pulse:  [70-92] 91  Resp:  [16-27] 18  SpO2:  [91 %-100 %] 100 %  BP: (128-165)/(56-83) 144/69     Date 12/10/20 0700 - 12/11/20 0659   Shift 1768-3866 3605-1739 6081-7227 24 Hour Total   INTAKE   P.O. 250   250   Shift Total(mL/kg) 250(2.7)   250(2.7)   OUTPUT   Shift Total(mL/kg)       Weight (kg) 91.1 91.1 91.1 91.1                   Male External Urinary Catheter 12/09/20 2040 Small (Active)   Collection Container Urimeter 12/10/20 0302   Securement Method secured to top of thigh w/ tape 12/10/20 0302   Skin no redness;no breakdown 12/10/20 0302   Tolerance no signs/symptoms of discomfort 12/10/20 0302   Catheter Change Date 12/09/20 12/10/20 0302   Catheter Change Time 2040 12/10/20 0302       Neurosurgery Physical Exam   AAOx4  CN grossly intact  Pupils reactive 2mm,  coloboma R eye   L gaze preference in left eye baseline, facial droop  Follows commands x4  4/5 LUE with drift o/w 5/5  SILT    Significant Labs:  Recent Labs   Lab 12/08/20  2104 12/09/20  0233 12/10/20  0055    116* 114*   * 138 138   K 3.9 4.0 3.8   * 105 106   CO2 21* 22* 19*   BUN 17 16 19   CREATININE 1.3 1.2 1.3   CALCIUM 9.2 9.1 8.4*   MG  --  1.5* 1.6     Recent Labs   Lab 12/08/20 2104  12/09/20  0233 12/10/20  0055   WBC 12.50 10.77 8.02   HGB 15.0 13.7* 13.9*   HCT 43.4 40.8 41.3   * 109* 97*     Recent Labs   Lab 12/08/20  2104   INR 1.1   APTT 24.8       Significant Diagnostics:  No results found in the last 24 hours.

## 2020-12-10 NOTE — NURSING
Wound Care Documentation:    Wound care consulted:  Yes    LDA added: Yes    Type of Wound: skin tear, abrasion    Location of Wound: L elbow and knee

## 2020-12-10 NOTE — SUBJECTIVE & OBJECTIVE
Neurologic Chief Complaint: R Basal Ganglia Hemorrhage    Subjective:     Interval History: Patient is seen for follow-up neurological assessment and treatment recommendations: KIRBY; patient neuro exam stable; plan to step down to vascular neurology today    HPI, Past Medical, Family, and Social History remains the same as documented in the initial encounter.      Review of Systems   Constitutional: Negative for chills, fatigue and fever.   HENT: Negative for drooling, facial swelling and trouble swallowing.    Eyes: Negative for photophobia, pain and visual disturbance.   Respiratory: Negative for cough, chest tightness, shortness of breath and wheezing.    Cardiovascular: Negative for chest pain, palpitations and leg swelling.   Gastrointestinal: Negative for abdominal pain, constipation, diarrhea, nausea and vomiting.   Endocrine: Negative for cold intolerance and heat intolerance.   Genitourinary: Negative for dysuria and hematuria.   Musculoskeletal: Positive for gait problem. Negative for neck pain and neck stiffness.   Skin: Negative for rash and wound.   Allergic/Immunologic: Negative for environmental allergies and food allergies.   Neurological: Positive for weakness. Negative for dizziness, tremors, light-headedness, numbness and headaches.   Hematological: Negative for adenopathy. Does not bruise/bleed easily.   Psychiatric/Behavioral: Negative for agitation, confusion and hallucinations.     Objective:     Vital Signs (Most Recent):  Temp: 97 °F (36.1 °C) (12/10/20 1102)  Pulse: 76 (12/10/20 1302)  Resp: 20 (12/10/20 1302)  BP: (!) 106/55 (12/10/20 1302)  SpO2: 100 % (12/10/20 1302)    Vital Signs Range (Last 24H):  Temp:  [97 °F (36.1 °C)-99.1 °F (37.3 °C)]   Pulse:  [70-91]   Resp:  [16-27]   BP: (106-165)/(55-78)   SpO2:  [91 %-100 %]     Physical Exam  Vitals signs and nursing note reviewed.   Constitutional:       General: He is not in acute distress.     Appearance: He is well-developed. He is  not diaphoretic.   HENT:      Head: Normocephalic and atraumatic.      Right Ear: External ear normal.      Left Ear: External ear normal.      Nose: Nose normal.      Mouth/Throat:      Mouth: Mucous membranes are moist.   Eyes:      General: No scleral icterus.        Right eye: No discharge.         Left eye: No discharge.      Extraocular Movements:      Right eye: Abnormal extraocular motion (chronic since childhood) present.      Left eye: Abnormal extraocular motion (chronic since childhood) present.      Conjunctiva/sclera: Conjunctivae normal.      Pupils:      Right eye: Pupil is not round.   Neck:      Musculoskeletal: Normal range of motion and neck supple.   Cardiovascular:      Rate and Rhythm: Normal rate and regular rhythm.   Pulmonary:      Effort: Pulmonary effort is normal. No respiratory distress.      Breath sounds: Normal breath sounds.   Abdominal:      General: Bowel sounds are normal. There is no distension.      Palpations: Abdomen is soft.      Tenderness: There is no abdominal tenderness.   Skin:     General: Skin is warm and dry.      Capillary Refill: Capillary refill takes less than 2 seconds.   Neurological:      Mental Status: He is alert and oriented to person, place, and time.      Motor: Weakness present.         Neurological Exam:   LOC: alert  Attention Span: Good   Language: No aphasia  Articulation: No dysarthria  Orientation: Person, Place, Time   Facial Movement (CN VII): Symmetric facial expression    Motor: Arm left  Normal 5/5  Leg left  Paresis: 4/5  Arm right  Normal 5/5  Leg right Normal 5/5  Sensation: Intact to light touch, temperature and vibration      Laboratory:  CMP:   Recent Labs   Lab 12/10/20  0055   CALCIUM 8.4*   ALBUMIN 3.3*   PROT 6.1      K 3.8   CO2 19*      BUN 19   CREATININE 1.3   ALKPHOS 77   ALT 27   AST 54*   BILITOT 1.6*     CBC:   Recent Labs   Lab 12/10/20  0055   WBC 8.02   RBC 4.23*   HGB 13.9*   HCT 41.3   PLT 97*   MCV 98   MCH  32.9*   MCHC 33.7     Lipid Panel:   Recent Labs   Lab 12/09/20  0233   CHOL 71*   LDLCALC 31.6*   HDL 27*   TRIG 62     Coagulation:   Recent Labs   Lab 12/08/20  2104   INR 1.1   APTT 24.8     Hgb A1C:   Recent Labs   Lab 12/09/20  0233   HGBA1C 5.2     TSH:   Recent Labs   Lab 12/09/20 0233   TSH 0.184*       Diagnostic Results:      Brain imaging:  CTH 12/8/2020   2.5 cm right basal ganglia hemorrhage at the level of the thalamus/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  There is no given history of hypertension and correlation is needed with follow-up recommended to exclude other etiology for hemorrhage.  There is mild 6 mm right to left shift of midline structures but no significant herniation.  No evidence of intraventricular hemorrhage, hydrocephalus or other sick convincing acute finding.  Extensive white matter low density likely is related to chronic small vessel ischemic changes. Component of white matter infarct is not excluded.  MRI with diffusion imaging could further evaluate.  No evidence of major arterial infarction.  Atrophy and additional incidental nonacute findings as above.      Vessel Imaging:  CTA Head and Neck 12/9/20  Right thalamic parenchymal hemorrhage with no new or worsening regional mass effect compared to 12/08/2020 head CT.  No new hemorrhage is elsewhere.     Unremarkable CTA of the head and neck.    Cardiac Evaluation:   TTE 12/9/20  · The left ventricle is normal in size with normal systolic function. The estimated ejection fraction is 65%.  · Normal left ventricular diastolic function.  · Normal right ventricular size with normal right ventricular systolic function.  · Normal central venous pressure (3 mmHg).  · The estimated PA systolic pressure is 29 mmHg.

## 2020-12-10 NOTE — ASSESSMENT & PLAN NOTE
Hever Reyes Jr. is a 80 y.o. male with a significant medical history of BPH, GERD, HLD, HTN, OA, CAD, and hypothyroidism who presents to the hospital as a transfer from [facility] for evaluation of ICH.  He states his leg gave out causing him to fall and he was unable to get up without assistance.  A CTH revealed a right basal ganglia hemorrhage. CTA H&N showed R basal ganglia changes but was otherwise unremarkable.    Antithrombotics for secondary stroke prevention: Antiplatelets: None: Intracerebral Hemorrhage    Statins for secondary stroke prevention and hyperlipidemia, if present:   Statins: Atorvastatin- 20 mg daily    Aggressive risk factor modification: HTN, HLD, CAD     Rehab efforts: The patient has been evaluated by a stroke team provider and the therapy needs have been fully considered based off the presenting complaints and exam findings. The following therapy evaluations are needed: PT evaluate and treat, OT evaluate and treat, SLP evaluate and treat, PM&R evaluate for appropriate placement    Diagnostics ordered/pending: MRI head w/ without contrast to assess brain parenchyma, TTE to assess cardiac function/status     VTE prophylaxis: Mechanical prophylaxis: Place SCDs  None: Reason for No Pharmacological VTE Prophylaxis: History of systemic or intracranial bleeding    BP parameters: ICH: SBP <140

## 2020-12-10 NOTE — PROVIDER TRANSFER
"[facility]  Transfer of Care Note      Patient Name: Hever Reyes Jr.  MRN: [id number]  Admission Date: 12/9/2020  Hospital Length of Stay: 1 days  Transfer Date: 12/10/2020  Attending Physician: Cristobal Yao MD   Primary Care Provider: Hever Carvalho MD  Reason for Admission: Right Basal Ganglia IPH    HPI:   81 yo M presents with 2.5cm R BG IPH with mild mass effect, 6mm right to left shift, and effacement of the right lateral ventricle s/p fall. Pt and bedside family report he fell this morning and was found down 2 hours after falling. Pt describes his fall as "I think my leg gave out. I was getting up from the toilet and walking back to another room and then I was just on the floor and couldn't get myself up." He denies LOC, dizziness, or syncope, and his home meds include ASA 81mg daily and fish oil 1000mg, without AC use. Coags in ED WNL. PMH includes: anemia, BPH, CAD/MI 5 years ago, GERD, hyperlipidemia, hypertension, hyperthyroidism, kidney stones, osteoarthritis, sciatica, and sinusitis. Pt is being admitted to [facility] for monitoring and management of R BG IPH.    Hospital Course:   12/9: admitted to [facility]  12/10: NAEON. Patient's repeat CT head stable. Transfer to floor under Vascular Neurology.    * Nontraumatic acute hemorrhage of basal ganglia  2.5cm R BG IPH with mild mass effect, 6mm right to left shift, and effacement of the right lateral ventricle on CTH s/p fall    -on ASA 81 daily and fish oil 1000mg at home, hold in acute setting  -coags WNL on admit  -NSGY consulted, awaiting reccs  -repeat CTH pending  -avoid AC/AP in setting of bleed  -SBP <160  -PT/OT/SLP  -q1hr neuro checks     12/10/2020: NAEON. Patient's repeat CT head stable. Patient stable for transfer to floor under Vascular Neurology.          Mixed hyperlipidemia  Continue statin    Vasogenic brain edema  -see IPH  - q1hr neuro checks  -repeat imaging if exam change    BPH (benign prostatic hyperplasia)  Continue " home tamsulosin    CAD (coronary artery disease)  -hold AS and fish oil in acute setting    History of myocardial infarction  - hx of MI 5 years ago  -CAD  - continue cardiac monitoring  -cardiac enzymes WNL on admit    Hypothyroidism  - continue home levothyroxine    Essential hypertension  SBP <160, labetalol prn    -takes metoprolol ER 100mg at home, hold ER in acute setting  -can convert to metoprolol IR 50 BID if needed    Iron deficiency anemia  History of, takes iron 65 mg at home  -daily CBC    Fall due to stumbling  Pt states his leg gave out when walking; wound care consulted and on board; following official note and recommendations  - PT/OT      Consults (From admission, onward)        Status Ordering Provider     Inpatient consult to Neurosurgery  Once     Provider:  (Not yet assigned)    Acknowledged RASHAD BUSH     Inpatient consult to Physical Medicine Rehab  Once     Provider:  (Not yet assigned)    Completed JACOBO CRENSHAW     Inpatient consult to Registered Dietitian/Nutritionist  Once     Provider:  (Not yet assigned)    Completed JACOBO CRENSHAW     Inpatient consult to Vascular (Stroke) Neurology  Once     Provider:  (Not yet assigned)    Completed JACOBO CRENSHAW     IP consult to case management/social work  Once     Provider:  (Not yet assigned)    Acknowledged RASHAD BUSH        * No surgery found *    Diet Orders          Diet Adult Regular (IDDSI Level 7): Regular starting at 12/09 1716        Activity Orders          Diet Adult Regular (IDDSI Level 7): Regular starting at 12/09 1716        Pending Diagnostic Studies:     None        Rashad Bush MD  [facility]

## 2020-12-10 NOTE — PT/OT/SLP EVAL
Speech Language Pathology Evaluation  Cognitive Communication    Patient Name:  Hever Reyes Jr.   MRN:  [id number]  Admitting Diagnosis: Nontraumatic acute hemorrhage of basal ganglia    Recommendations:     Recommendations:                General Recommendations:  Speech/language therapy and Cognitive-linguistic therapy  Diet recommendations:  Regular, Thin   Aspiration Precautions: 1 bite/sip at a time, Small bites/sips and Standard aspiration precautions   General Precautions: Standard, fall, seizure, vision impaired  Communication strategies:  none    History:     Past Medical History:   Diagnosis Date    Anemia     BPH (benign prostatic hyperplasia)     Coronary artery disease     GERD (gastroesophageal reflux disease)     HLD (hyperlipidemia)     Hypertension     Hyperthyroidism     Nephrolithiasis     Osteoarthritis     Sciatica     Sinusitis        Past Surgical History:   Procedure Laterality Date    CATARACT EXTRACTION      CORONARY ANGIOPLASTY WITH STENT PLACEMENT      LAPAROSCOPIC CHOLECYSTECTOMY N/A 5/6/2019    Procedure: CHOLECYSTECTOMY, LAPAROSCOPIC;  Surgeon: Liam Ordonez MD;  Location: [facility];  Service: General;  Laterality: N/A;  Aminah video confirmed 5/3/dme    PROSTATE BIOPSY         Social History: Patient lives with his son, daughter-in-law and three children in [place].    Occupation/hobbies/homemaking: Patient is retired from working as an . He enjoys taking walks around the neighborhood and watching TV.     Subjective     Patient awake and alert during session  Communicated with nurse prior to session     Pain/Comfort:  · Pain Rating 1: 0/10  · Pain Rating Post-Intervention 1: 0/10    Objective:   Cognitive Status:    Arousal/Alertness Delayed response to stimuli during session  Attention Sustained attention deficit present as session progressed  Orientation Person, Situation and Time  Memory Immediate Recall WFL and Delayed Recall: independently  recalled 1/3 unrelated objects with a 5-minute delay, increasing to 3/3 with max assist  Problem Solving Conclusions 1/3 with mod assist and Categories 2/3 with max assist  Simple calculation 1/2 on simple time/money management tasks with mod assist      Receptive Language:   Comprehension:    Questions Complex yes/no WFL  Commands  multistep basic commands WFL    Pragmatics:    Flat affect and inconsistent eye contact    Expressive Language:  Verbal:    Repetition Phrases WFL  Naming Confrontation WFL and Convergent WFL  Conversational speech short utterances and minimal initation during session      Motor Speech:  Mild dysarthria noted but not affecting intellibility    Voice:   WFL    Visual-Spatial:  TBA though he endorsed impaired vision on left side     Reading:   TBA     Written Expression:   TBA    Treatment: Patient seen for a speech/language/cognitive eval. He was sitting up in bed with his daughter present during session. Patient and daughter reported no difficulties with current diet. SLP educated them regarding POC for ST, but patient would benefit from ongoing instruction. Patient left in bed with call light within reach and daughter present.     Assessment:   Hever Reyes Jr. is a 80 y.o. male with an SLP diagnosis of Dysarthria, Cognitive-Linguistic Impairment and Visio-Spatial Impairment.      Goals:   Multidisciplinary Problems     SLP Goals        Problem: SLP Goal    Goal Priority Disciplines Outcome   SLP Goal     SLP Ongoing, Progressing   Description: Speech-Language Pathology Goals   Goals to be met by 12/16/20  1. Patient will participate in ongoing swallow assessment in order to determine safest least restrictive diet.   2. Patient will participate in a speech/language/cognitive assessment (met & ongoing).  3. Patient will answer problem solving/safety awareness questions with 80% accuracy and mod assist.   4. Patient will answer mental manipulation questions with 75% accuracy and mod  assist.   5. Patient will participate in ongoing assessment of reading/writing/visuospatial skills.                    Plan:   · Patient to be seen:  4 x/week   · Plan of Care expires:  01/08/21  · Plan of Care reviewed with:  patient, daughter   · SLP Follow-Up:  Yes       Discharge recommendations:  Discharge Facility/Level of Care Needs: rehabilitation facility   Barriers to Discharge:  None    Time Tracking:   SLP Treatment Date:   12/10/20  Speech Start Time:  1300  Speech Stop Time:  1317     Speech Total Time (min):  17 min    Billable Minutes: Eval 9  and Self Care/Home Management Training 8    Job Zhou CCC-SLP  Speech-Language Pathology  Pager: [phone]   12/10/2020

## 2020-12-10 NOTE — ASSESSMENT & PLAN NOTE
Pt is 80yom w pmh CAD, HTN, hyperthyroid, HLD who presents with multipke falls and recent fall thi morning found down after getting out of bed where he lost balance and strength in his legs, denies LOC. Pt AOx4 and states he feelse generalized weakness. CTH showed acute R BG hemorhhage 2.5cm in size. NSGY consulted for evaluation.     Plan:  Admit NCC  Q1hr neurochecks  CTA negative  HOB>30  SBP<140  Na 135-150  Keppra  VN consultation  PT/OT  Stable for stepdown to VN from NSGY perspective. No neurosurgical intervention required.

## 2020-12-10 NOTE — ASSESSMENT & PLAN NOTE
2.5cm R BG IPH with mild mass effect, 6mm right to left shift, and effacement of the right lateral ventricle on CTH s/p fall    -on ASA 81 daily and fish oil 1000mg at home, hold in acute setting  -coags WNL on admit  -NSGY consulted, awaiting reccs  -repeat CTH pending  -avoid AC/AP in setting of bleed  -SBP <160  -PT/OT/SLP  -q1hr neuro checks     12/10/2020: KIRBY. Patient's repeat CT head stable. Patient stable for transfer to floor under Vascular Neurology.

## 2020-12-11 ENCOUNTER — TELEPHONE (OUTPATIENT)
Dept: NEUROSURGERY | Facility: CLINIC | Age: 80
End: 2020-12-11

## 2020-12-11 DIAGNOSIS — I61.0 NONTRAUMATIC SUBCORTICAL HEMORRHAGE OF CEREBRAL HEMISPHERE, UNSPECIFIED LATERALITY: Primary | ICD-10-CM

## 2020-12-11 PROBLEM — Z78.9 IMPAIRED MOBILITY AND ADLS: Status: ACTIVE | Noted: 2020-12-11

## 2020-12-11 PROBLEM — Z74.09 IMPAIRED MOBILITY AND ADLS: Status: ACTIVE | Noted: 2020-12-11

## 2020-12-11 LAB
ALBUMIN SERPL BCP-MCNC: 3.5 G/DL (ref 3.5–5.2)
ALP SERPL-CCNC: 83 U/L (ref 55–135)
ALT SERPL W/O P-5'-P-CCNC: 31 U/L (ref 10–44)
ANION GAP SERPL CALC-SCNC: 12 MMOL/L (ref 8–16)
AST SERPL-CCNC: 47 U/L (ref 10–40)
BASOPHILS # BLD AUTO: 0.05 K/UL (ref 0–0.2)
BASOPHILS NFR BLD: 0.6 % (ref 0–1.9)
BILIRUB SERPL-MCNC: 1.4 MG/DL (ref 0.1–1)
BUN SERPL-MCNC: 16 MG/DL (ref 8–23)
CALCIUM SERPL-MCNC: 8.4 MG/DL (ref 8.7–10.5)
CHLORIDE SERPL-SCNC: 105 MMOL/L (ref 95–110)
CO2 SERPL-SCNC: 22 MMOL/L (ref 23–29)
CREAT SERPL-MCNC: 1.2 MG/DL (ref 0.5–1.4)
DIFFERENTIAL METHOD: ABNORMAL
EOSINOPHIL # BLD AUTO: 0.2 K/UL (ref 0–0.5)
EOSINOPHIL NFR BLD: 2.9 % (ref 0–8)
ERYTHROCYTE [DISTWIDTH] IN BLOOD BY AUTOMATED COUNT: 13 % (ref 11.5–14.5)
EST. GFR  (AFRICAN AMERICAN): >60 ML/MIN/1.73 M^2
EST. GFR  (NON AFRICAN AMERICAN): 56.4 ML/MIN/1.73 M^2
GLUCOSE SERPL-MCNC: 115 MG/DL (ref 70–110)
HCT VFR BLD AUTO: 40.7 % (ref 40–54)
HGB BLD-MCNC: 13.3 G/DL (ref 14–18)
IMM GRANULOCYTES # BLD AUTO: 0.02 K/UL (ref 0–0.04)
IMM GRANULOCYTES NFR BLD AUTO: 0.3 % (ref 0–0.5)
LYMPHOCYTES # BLD AUTO: 1.2 K/UL (ref 1–4.8)
LYMPHOCYTES NFR BLD: 15.6 % (ref 18–48)
MAGNESIUM SERPL-MCNC: 1.7 MG/DL (ref 1.6–2.6)
MCH RBC QN AUTO: 32.1 PG (ref 27–31)
MCHC RBC AUTO-ENTMCNC: 32.7 G/DL (ref 32–36)
MCV RBC AUTO: 98 FL (ref 82–98)
MONOCYTES # BLD AUTO: 0.8 K/UL (ref 0.3–1)
MONOCYTES NFR BLD: 9.9 % (ref 4–15)
NEUTROPHILS # BLD AUTO: 5.6 K/UL (ref 1.8–7.7)
NEUTROPHILS NFR BLD: 70.7 % (ref 38–73)
NRBC BLD-RTO: 0 /100 WBC
PHOSPHATE SERPL-MCNC: 3 MG/DL (ref 2.7–4.5)
PLATELET # BLD AUTO: 96 K/UL (ref 150–350)
PMV BLD AUTO: 11 FL (ref 9.2–12.9)
POTASSIUM SERPL-SCNC: 3.5 MMOL/L (ref 3.5–5.1)
PROT SERPL-MCNC: 6 G/DL (ref 6–8.4)
RBC # BLD AUTO: 4.14 M/UL (ref 4.6–6.2)
SODIUM SERPL-SCNC: 139 MMOL/L (ref 136–145)
WBC # BLD AUTO: 7.86 K/UL (ref 3.9–12.7)

## 2020-12-11 PROCEDURE — 80053 COMPREHEN METABOLIC PANEL: CPT

## 2020-12-11 PROCEDURE — 25000003 PHARM REV CODE 250: Performed by: NURSE PRACTITIONER

## 2020-12-11 PROCEDURE — 99222 1ST HOSP IP/OBS MODERATE 55: CPT | Mod: ,,, | Performed by: NURSE PRACTITIONER

## 2020-12-11 PROCEDURE — A9585 GADOBUTROL INJECTION: HCPCS | Performed by: PSYCHIATRY & NEUROLOGY

## 2020-12-11 PROCEDURE — 99232 SBSQ HOSP IP/OBS MODERATE 35: CPT | Mod: ,,, | Performed by: PHYSICIAN ASSISTANT

## 2020-12-11 PROCEDURE — 25000003 PHARM REV CODE 250: Performed by: INTERNAL MEDICINE

## 2020-12-11 PROCEDURE — 25000003 PHARM REV CODE 250: Performed by: PHYSICIAN ASSISTANT

## 2020-12-11 PROCEDURE — 83735 ASSAY OF MAGNESIUM: CPT

## 2020-12-11 PROCEDURE — 99222 PR INITIAL HOSPITAL CARE,LEVL II: ICD-10-PCS | Mod: ,,, | Performed by: NURSE PRACTITIONER

## 2020-12-11 PROCEDURE — 25500020 PHARM REV CODE 255: Performed by: PSYCHIATRY & NEUROLOGY

## 2020-12-11 PROCEDURE — 99233 SBSQ HOSP IP/OBS HIGH 50: CPT | Mod: ,,, | Performed by: PSYCHIATRY & NEUROLOGY

## 2020-12-11 PROCEDURE — 92526 ORAL FUNCTION THERAPY: CPT

## 2020-12-11 PROCEDURE — 99233 PR SUBSEQUENT HOSPITAL CARE,LEVL III: ICD-10-PCS | Mod: ,,, | Performed by: PSYCHIATRY & NEUROLOGY

## 2020-12-11 PROCEDURE — 11000001 HC ACUTE MED/SURG PRIVATE ROOM

## 2020-12-11 PROCEDURE — 36415 COLL VENOUS BLD VENIPUNCTURE: CPT

## 2020-12-11 PROCEDURE — 63600175 PHARM REV CODE 636 W HCPCS: Performed by: NURSE PRACTITIONER

## 2020-12-11 PROCEDURE — 84100 ASSAY OF PHOSPHORUS: CPT

## 2020-12-11 PROCEDURE — 85025 COMPLETE CBC W/AUTO DIFF WBC: CPT

## 2020-12-11 PROCEDURE — 99232 PR SUBSEQUENT HOSPITAL CARE,LEVL II: ICD-10-PCS | Mod: ,,, | Performed by: PHYSICIAN ASSISTANT

## 2020-12-11 RX ORDER — POLYETHYLENE GLYCOL 3350 17 G/17G
17 POWDER, FOR SOLUTION ORAL DAILY
Status: DISCONTINUED | OUTPATIENT
Start: 2020-12-11 | End: 2020-12-14

## 2020-12-11 RX ORDER — METOPROLOL SUCCINATE 100 MG/1
100 TABLET, EXTENDED RELEASE ORAL DAILY
Status: DISCONTINUED | OUTPATIENT
Start: 2020-12-11 | End: 2020-12-14 | Stop reason: HOSPADM

## 2020-12-11 RX ORDER — HEPARIN SODIUM 5000 [USP'U]/ML
5000 INJECTION, SOLUTION INTRAVENOUS; SUBCUTANEOUS EVERY 8 HOURS
Status: DISCONTINUED | OUTPATIENT
Start: 2020-12-11 | End: 2020-12-14 | Stop reason: HOSPADM

## 2020-12-11 RX ORDER — ATORVASTATIN CALCIUM 20 MG/1
20 TABLET, FILM COATED ORAL DAILY
Status: DISCONTINUED | OUTPATIENT
Start: 2020-12-11 | End: 2020-12-14 | Stop reason: HOSPADM

## 2020-12-11 RX ORDER — AMLODIPINE BESYLATE 5 MG/1
5 TABLET ORAL DAILY
Status: DISCONTINUED | OUTPATIENT
Start: 2020-12-11 | End: 2020-12-12

## 2020-12-11 RX ORDER — GADOBUTROL 604.72 MG/ML
10 INJECTION INTRAVENOUS
Status: COMPLETED | OUTPATIENT
Start: 2020-12-11 | End: 2020-12-11

## 2020-12-11 RX ADMIN — TAMSULOSIN HYDROCHLORIDE 0.4 MG: 0.4 CAPSULE ORAL at 08:12

## 2020-12-11 RX ADMIN — DOCUSATE SODIUM 50MG AND SENNOSIDES 8.6MG 1 TABLET: 8.6; 5 TABLET, FILM COATED ORAL at 08:12

## 2020-12-11 RX ADMIN — HEPARIN SODIUM 5000 UNITS: 5000 INJECTION INTRAVENOUS; SUBCUTANEOUS at 02:12

## 2020-12-11 RX ADMIN — ACETAMINOPHEN 650 MG: 325 TABLET ORAL at 09:12

## 2020-12-11 RX ADMIN — ATORVASTATIN CALCIUM 20 MG: 20 TABLET, FILM COATED ORAL at 08:12

## 2020-12-11 RX ADMIN — LABETALOL HCL IV SOLN PREFILLED SYRINGE 20 MG/4ML (5 MG/ML) 10 MG: 20/4 SOLUTION PREFILLED SYRINGE at 04:12

## 2020-12-11 RX ADMIN — OXYCODONE HYDROCHLORIDE 5 MG: 5 TABLET ORAL at 08:12

## 2020-12-11 RX ADMIN — GADOBUTROL 10 ML: 604.72 INJECTION INTRAVENOUS at 05:12

## 2020-12-11 RX ADMIN — HEPARIN SODIUM 5000 UNITS: 5000 INJECTION INTRAVENOUS; SUBCUTANEOUS at 08:12

## 2020-12-11 RX ADMIN — POLYETHYLENE GLYCOL 3350 17 G: 17 POWDER, FOR SOLUTION ORAL at 02:12

## 2020-12-11 RX ADMIN — LEVOTHYROXINE SODIUM 200 MCG: 100 TABLET ORAL at 09:12

## 2020-12-11 RX ADMIN — AMLODIPINE BESYLATE 5 MG: 5 TABLET ORAL at 08:12

## 2020-12-11 RX ADMIN — METOPROLOL SUCCINATE 100 MG: 100 TABLET, EXTENDED RELEASE ORAL at 08:12

## 2020-12-11 NOTE — SUBJECTIVE & OBJECTIVE
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 12/9/2020    Osteoarthritis     Sciatica     Sinusitis      Past Surgical History:   Procedure Laterality Date    CATARACT EXTRACTION      CORONARY ANGIOPLASTY WITH STENT PLACEMENT      LAPAROSCOPIC CHOLECYSTECTOMY N/A 5/6/2019    Procedure: CHOLECYSTECTOMY, LAPAROSCOPIC;  Surgeon: Liam Ordonez MD;  Location: [facility];  Service: General;  Laterality: N/A;  Volin video confirmed 5/3/dme    PROSTATE BIOPSY       Review of patient's allergies indicates:  No Known Allergies    Scheduled Medications:    amLODIPine  5 mg Oral Daily    atorvastatin  20 mg Oral Daily    heparin (porcine)  5,000 Units Subcutaneous Q8H    levothyroxine  200 mcg Oral Before breakfast    metoprolol succinate  100 mg Oral Daily    senna-docusate 8.6-50 mg  1 tablet Oral BID    tamsulosin  1 capsule Oral Daily       PRN Medications: acetaminophen, labetalol, ondansetron, oxyCODONE, sodium chloride 0.9%    Family History     Family history is unknown by patient.        Tobacco Use    Smoking status: Never Smoker    Smokeless tobacco: Never Used   Substance and Sexual Activity    Alcohol use: No    Drug use: No    Sexual activity: Not on file     Review of Systems   Constitutional: Positive for activity change and fatigue. Negative for chills.   HENT: Positive for hearing loss. Negative for drooling, trouble swallowing and voice change.    Eyes: Negative for pain and visual disturbance.   Respiratory: Negative for cough and shortness of breath.    Cardiovascular: Negative for chest pain and palpitations.   Gastrointestinal: Negative for nausea and vomiting.   Genitourinary: Negative for difficulty  urinating and flank pain.   Musculoskeletal: Positive for gait problem. Negative for back pain and myalgias.   Skin: Negative for rash and wound.   Neurological: Positive for weakness. Negative for dizziness, numbness and headaches.   Psychiatric/Behavioral: Negative for agitation. The patient is not nervous/anxious.      Objective:     Vital Signs (Most Recent):  Temp: 97.3 °F (36.3 °C) (12/11/20 1213)  Pulse: 81 (12/11/20 1213)  Resp: 18 (12/11/20 1213)  BP: (!) 152/69 (12/11/20 1213)  SpO2: (!) 92 % (12/11/20 1213)    Vital Signs (24h Range):  Temp:  [97.3 °F (36.3 °C)-99.1 °F (37.3 °C)] 97.3 °F (36.3 °C)  Pulse:  [64-81] 81  Resp:  [18-20] 18  SpO2:  [92 %-100 %] 92 %  BP: (106-184)/(55-97) 152/69     Body mass index is 33.42 kg/m².    Physical Exam  Vitals signs reviewed.   Constitutional:       General: He is sleeping. He is not in acute distress.     Appearance: He is well-developed.   HENT:      Head: Normocephalic and atraumatic.      Right Ear: External ear normal. Decreased hearing noted.      Left Ear: External ear normal. Decreased hearing noted.      Nose: Nose normal.   Eyes:      General: No scleral icterus.        Right eye: No discharge.         Left eye: No discharge.      Extraocular Movements:      Right eye: Abnormal extraocular motion present.      Left eye: Abnormal extraocular motion present.   Neck:      Musculoskeletal: Normal range of motion.   Cardiovascular:      Rate and Rhythm: Normal rate.   Pulmonary:      Effort: Pulmonary effort is normal. No respiratory distress.   Abdominal:      General: There is no distension.      Palpations: Abdomen is soft.      Tenderness: There is no abdominal tenderness.   Musculoskeletal: Normal range of motion.         General: No tenderness.   Skin:     General: Skin is warm and dry.      Findings: No rash.   Neurological:      Mental Status: He is easily aroused.      Motor: Weakness present.   Psychiatric:         Mood and Affect: Mood normal.          Behavior: Behavior normal.            Diagnostic Results:   Labs: Reviewed  X-Ray: Reviewed  CT: Reviewed

## 2020-12-11 NOTE — ASSESSMENT & PLAN NOTE
Stroke risk factor.  The patient is prescribed atorvastatin 80 mg daily.  LDL 31.6.  Atorvastatin 20 mg daily

## 2020-12-11 NOTE — NURSING
Nursing Transfer Note       Transfer to 921 from 9094    Transfer via wheelchair    Transfer with cardiac monitoring    Transported by RN    Medicines sent: no    Chart sent with patient: Yes    Belongings sent with patient: black suitcase (3 pair of underwear [2 blues and 1 black and white], daily pill baldwin with day and night, 3 white undertshirts, 3 pajama pants haile, grey w Amauri Ross and blue; 6 pairs of white socks), brown pants, brown belt, plaid underwear, white socks x2, navy t shirt, dentures    Notified: daughter    Bedside Neuro assessment performed: Yes    Bedside Handoff given to: SKYLAR Sawyer     Upon arrival to floor: cardiac monitor applied, patient oriented to room, call bell in reach and bed in lowest position

## 2020-12-11 NOTE — HPI
"79 yo M presents with 2.5cm R BG IPH with mild mass effect, 6mm right to left shift, and effacement of the right lateral ventricle s/p fall. Pt and bedside family report he fell this morning and was found down 2 hours after falling. Pt describes his fall as "I think my leg gave out. I was getting up from the toilet and walking back to another room and then I was just on the floor and couldn't get myself up." He denies LOC, dizziness, or syncope, and his home meds include ASA 81mg daily and fish oil 1000mg, without AC use. Coags in ED WNL. PMH includes: anemia, BPH, CAD/MI 5 years ago, GERD, hyperlipidemia, hypertension, hyperthyroidism, kidney stones, osteoarthritis, sciatica, and sinusitis. Pt is being admitted to [facility] for monitoring and management of R BG IPH.  "
"Hever Reyes Jr. is a 80 y.o. male with a significant medical history of BPH, GERD, HLD, HTN, OA, CAD, and hypothyroidism who presents to the hospital as a transfer from [facility] for evaluation of ICH.  The patient was in his usual state of health and states he stood up and attempted to walk and his legs gave out.  He fell subsequently striking the left side of his body including his chest and head.  The patient states he was then unable to get up from the floor without assistance.  He denies LOC or any preceding symptoms.  He presented to the [facility] ED where a CTH was obtained and revealed a right ICH.  The patient was transferred to [facility] for further evaluation by Neurosurgery, higher level of care.  Currently the patient is AA&O x 4.  He is complaining of a right frontal headache and RUE heaviness"  but currently denies dizziness, chest pain, N/V, abdominal pain, or change in vision.  On exam the patient is noted to have a dysconjugate gaze and surgical pupil which are baseline for the patient.  He was seen by Neurosurgery while in ED.  No neurosurgical intervention at this time.  The patient is admitted to [facility].    "
Hever Reyes  is an 80-year-old male with PMHx of HTN, HLD, BPH, GERD, osteoarthritis, sciatica, and nephrolithiasis.  Patient presented to [facility] after fall with.  On arrival, found to have R basal ganglia IPH.  He was then transferred to [facility] on 12/9/20 for further evaluation and management.  Admitted to [facility].  Neurosurgery consulted and no acute surgical intervention.  Stepped down to Vascular Neurology; etiology likely hypertension.  PT, OT, and SLP following.  PM&R consulted to assess rehabilitation needs.      Functional History: Patient lives in [place] with his son, DIL, and 3 grandchildren in a single story home without steps to enter.  Prior to admission, he was (I) with ADLs and mobility.  DME: none.  
Pt is 80yom w pmh CAD, HTN, hyperthyroid, HLD who presents with multipke falls and recent fall thi morning found down after getting out of bed where he lost balance and strength in his legs, denies LOC. Pt AOx4 and states he feelse generalized weakness. CTH showed acute R BG hemorhhage 2.5cm in size. NSGY consulted for evaluation.      
1.64

## 2020-12-11 NOTE — ASSESSMENT & PLAN NOTE
-2/2 ICH  -no surgical intervention  -management per primary team   Letter mailed to pt with results.

## 2020-12-11 NOTE — HOSPITAL COURSE
12/9/20:  Evaluated by PT, OT, and SLP.  BM David.  Sit to stand CGA.  Ambulated 20 ft CGA.  Feeding CGA, grooming set-upA-David, UBD modA, and LBD maxA.  SLP recommendation: mechanical soft diet and thin liquids.   12/10/20:  Participated with PT and SLP.  BM CGA-David.  Sit to stand David.  Ambulated 9 ft F/B x 6 David.  Found to have dysarthria, visio-spatial impairment, and cognitive-linguistic impairment.  SLP recommendation: regular diet and thin liquids.

## 2020-12-11 NOTE — PROGRESS NOTES
[facility]  Neurosurgery  Progress Note    Subjective:     History of Present Illness: Pt is 80yom w pmh CAD, HTN, hyperthyroid, HLD who presents with multipke falls and recent fall thi morning found down after getting out of bed where he lost balance and strength in his legs, denies LOC. Pt AOx4 and states he feelse generalized weakness. CTH showed acute R BG hemorhhage 2.5cm in size. NSGY consulted for evaluation.        Post-Op Info:  * No surgery found *         Interval History: NAEON. Neurologically stable.     Medications:  Continuous Infusions:  Scheduled Meds:   amLODIPine  5 mg Oral Daily    atorvastatin  20 mg Oral Daily    heparin (porcine)  5,000 Units Subcutaneous Q8H    levothyroxine  200 mcg Oral Before breakfast    metoprolol succinate  100 mg Oral Daily    polyethylene glycol  17 g Oral Daily    senna-docusate 8.6-50 mg  1 tablet Oral BID    tamsulosin  1 capsule Oral Daily     PRN Meds:acetaminophen, labetalol, ondansetron, oxyCODONE, sodium chloride 0.9%     Review of Systems  Objective:     Weight: 91.1 kg (200 lb 13.4 oz)  Body mass index is 33.42 kg/m².  Vital Signs (Most Recent):  Temp: 97.3 °F (36.3 °C) (12/11/20 1213)  Pulse: 81 (12/11/20 1213)  Resp: 18 (12/11/20 1213)  BP: (!) 152/69 (12/11/20 1213)  SpO2: (!) 92 % (12/11/20 1213) Vital Signs (24h Range):  Temp:  [97.3 °F (36.3 °C)-99.1 °F (37.3 °C)] 97.3 °F (36.3 °C)  Pulse:  [64-81] 81  Resp:  [18-20] 18  SpO2:  [92 %-100 %] 92 %  BP: (142-184)/(69-97) 152/69                Neurosurgery Physical Exam  AAOx4  CN grossly intact  Pupils reactive 2mm,  coloboma R eye   L gaze preference in left eye baseline, facial droop  Follows commands x4  4/5 LUE with drift o/w 5/5  SILT    Significant Labs:  Recent Labs   Lab 12/10/20  0055 12/11/20  0335   * 115*    139   K 3.8 3.5    105   CO2 19* 22*   BUN 19 16   CREATININE 1.3 1.2   CALCIUM 8.4* 8.4*   MG 1.6 1.7     Recent Labs   Lab 12/10/20  0055  12/11/20  0335   WBC 8.02 7.86   HGB 13.9* 13.3*   HCT 41.3 40.7   PLT 97* 96*     No results for input(s): LABPT, INR, APTT in the last 48 hours.  Microbiology Results (last 7 days)     ** No results found for the last 168 hours. **        All pertinent labs from the last 24 hours have been reviewed.    Significant Diagnostics:  I have reviewed all pertinent imaging results/findings within the past 24 hours.    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 Pt AOx4 and states he feelse generalized weakness. CTH showed acute R BG hemorhhage 2.5cm in size. NSGY consulted for evaluation.     Plan:  Neurologically stable  CTA negative  Will likely need MRI brain in future, will discuss with Dr. Dee Estrada PA-C  Neurosurgery  [facility]

## 2020-12-11 NOTE — ASSESSMENT & PLAN NOTE
-Moderate area of vasogenic cerebral edema identified when reviewing brain imaging in the territory of the R middle cerebral artery. There is mass effect associated with it. We will continue to monitor the patients clinical exam for any worsening of symptoms which may indicate expansion of the stroke or the area of the edema resulting in the clinical change. The pattern is suggestive of hypertensive etiology.

## 2020-12-11 NOTE — PT/OT/SLP PROGRESS
"Speech Language Pathology Treatment    Patient Name:  Hever Reyes Jr.   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    Recommendations:                 General Recommendations:  Dysphagia therapy and Cognitive-linguistic therapy  Diet recommendations:  Regular, Liquid Diet Level: Thin   Aspiration Precautions: 1 bite/sip at a time, Avoid talking while eating, no straws, Eliminate distractions, Feed only when awake/alert, Frequent oral care, HOB to 90 degrees, No straws, Remain upright 30 minutes post meal, Small bites/sips and Strict aspiration precautions   General Precautions: Standard, aspiration, fall, seizure, vision impaired  Communication strategies:  provide increased time to answer and go to room if call light pushed    Subjective     Pt presents calm  He explains, "I get hiccups"  Daughter asks, "Do you know how long it will be before he goes to rehab?"    Pain/Comfort:  · Pain Rating 1: 0/10    Objective:     Has the patient been evaluated by SLP for swallowing?   Yes  Keep patient NPO? No   Current Respiratory Status: room air      Pt found awake in bed eating lunch meal tray, Daughter at bedside. He was oriented x4. He endorsed a good appetite. He was seen with bites of semi-soft textures (strawberry shortcake) and sips thin liquids (straw sips x2.) He demonstrated increased hiccups following meal Pt did not turn head to SLP on L and required moderate verbal cues to turn head L to find clock on L of room. He completed fx time management tasks WNL. Pt and daughter were educated on SLP role, safety precautions, aspiration precautions, safe swallow strategies and recommendations for avoidance of straws, single small sips and discharge recommendations. Daughter asked about time of discharge, questions deferred to CM. Pt remained upright in bed with call light in reach and Daughter at bedside.     Assessment:     Hever Reyes Jr. is a 80 y.o. male " with an SLP diagnosis of Dysphagia, Cognitive-Linguistic Impairment and Visio-Spatial Impairment.      Goals:   Multidisciplinary Problems     SLP Goals        Problem: SLP Goal    Goal Priority Disciplines Outcome   SLP Goal     SLP Ongoing, Progressing   Description: Speech-Language Pathology Goals   Goals to be met by 12/16/20  1. Patient will participate in ongoing swallow assessment in order to determine safest least restrictive diet.   2. Patient will participate in a speech/language/cognitive assessment (met & ongoing).  3. Patient will answer problem solving/safety awareness questions with 80% accuracy and mod assist.   4. Patient will answer mental manipulation questions with 75% accuracy and mod assist.   5. Patient will participate in ongoing assessment of reading/writing/visuospatial skills.                    Plan:     · Patient to be seen:  4 x/week   · Plan of Care expires:  01/08/21  · Plan of Care reviewed with:  patient, daughter   · SLP Follow-Up:  Yes       Discharge recommendations:  rehabilitation facility       Time Tracking:     SLP Treatment Date:   12/11/20  Speech Start Time:  1245  Speech Stop Time:  1256     Speech Total Time (min):  11 min    Billable Minutes: Treatment Swallowing Dysfunction 11    WANDA Giraldo, [facility]-SLP  Speech-Language Pathology  Pager: [phone]    12/11/2020

## 2020-12-11 NOTE — PLAN OF CARE
Problem: SLP Goal  Goal: SLP Goal  Description: Speech-Language Pathology Goals   Goals to be met by 12/16/20  1. Patient will participate in ongoing swallow assessment in order to determine safest least restrictive diet.   2. Patient will participate in a speech/language/cognitive assessment (met & ongoing).  3. Patient will answer problem solving/safety awareness questions with 80% accuracy and mod assist.   4. Patient will answer mental manipulation questions with 75% accuracy and mod assist.   5. Patient will participate in ongoing assessment of reading/writing/visuospatial skills.   Outcome: Ongoing, Progressing    Pt progressing with goals. ST to continue to follow.     FARHAD Giraldo., [facility]-SLP  Speech-Language Pathology  Pager: [phone]  12/11/2020

## 2020-12-11 NOTE — PLAN OF CARE
12/11/20 1124   Post-Acute Status   Post-Acute Authorization Placement   Post-Acute Placement Status Referrals Sent  (rehab)     SW met with Pt and Pt daughter at bedside, also Pt son via speakerphone. Discussed therapy recs for rehab and provided list. Addressed questions and reported need to send referrals to obtain accepting options. Pt daughter/son agreeable and will review the list for preferences asap. Pt choice form signed and placed in chart. Currently, they want [facility] as preference then [facility].    JOSAFAT sent referrals via .    Advised by Kris with [facility] they have already accepted pending family preference.     Indy Kearney LCSW  Neurocritical Care   [facility]  [phone]

## 2020-12-11 NOTE — PT/OT/SLP PROGRESS
Occupational Therapy  Not Seen      Patient Name:  Hever Reyes Jr.   MRN:  [id number]  Length of Stay: 2 days  Recent Surgery: * No surgery found *      Patient plan of care 4x/w. Next follow up scheduled for Monday.   Pt is appropriate for progressive mobility with nursing staff. Please encourage and ensure that pt is up to chair for meals. And up to bedside commode for toileting task- assistance x1 person.     ROULA Choe 12/11/2020

## 2020-12-11 NOTE — ASSESSMENT & PLAN NOTE
Pt is 80yom w pmh CAD, HTN, hyperthyroid, HLD who presents with multipke falls and recent fall thi morning found down after getting out of bed where he lost balance and strength in his legs, denies LOC. Pt AOx4 and states he feelse generalized weakness. CTH showed acute R BG hemorhhage 2.5cm in size. NSGY consulted for evaluation.     Plan:  Neurologically stable  CTA negative  Will likely need MRI brain in future, will discuss with Dr. Price

## 2020-12-11 NOTE — PLAN OF CARE
12/11: Patient stepped down overnight. BP elevated - switched metoprolol to home dose and started Norvasc 5 mg. Will titrate up as needed. Waiting on rehab placement.     CM spoke with patient and daughter in room and we discussed recommendations for IRF discussed available IRF facilities in [facility], [place], [facility] and the [facility]. Patient and daughter stated they do not want to go in the [facility] or on the [facility]. They prefer [facility].    Referral sent to [facility].       12/11/20 5321   Discharge Reassessment   Assessment Type Discharge Planning Reassessment   Provided patient/caregiver education on the expected discharge date and the discharge plan Yes   Do you have any problems affording any of your prescribed medications? No   Discharge Plan A Rehab   Discharge Plan B Home Health   DME Needed Upon Discharge  other (see comments)  (tbd)   Patient choice form signed by patient/caregiver N/A   Anticipated Discharge Disposition Rehab   Can the patient/caregiver answer the patient profile reliably? Yes, cognitively intact   How does the patient rate their overall health at the present time? Fair   Describe the patient's ability to walk at the present time. Major restrictions/daily assistance from another person   How often would a person be available to care for the patient? Often   Number of comorbid conditions (as recorded on the chart) Five or more   During the past month, has the patient often been bothered by feeling down, depressed or hopeless? No   During the past month, has the patient often been bothered by little interest or pleasure in doing things? No   Post-Acute Status   Post-Acute Authorization Placement   Post-Acute Placement Status Pending Post-Acute Clinical Review   Discharge Delays None known at this time       Dinora Cam RN  Case Management  Ext: [phone]

## 2020-12-11 NOTE — SUBJECTIVE & OBJECTIVE
Neurologic Chief Complaint: R Basal Ganglia Hemorrhage    Subjective:     Interval History: Patient is seen for follow-up neurological assessment and treatment recommendations:     Patient stepped down overnight. BP elevated - switched metoprolol to home dose and started Norvasc 5 mg. Will titrate up as needed. Waiting on rehab placement.     HPI, Past Medical, Family, and Social History remains the same as documented in the initial encounter.      Review of Systems   Constitutional: Negative for fever.   Eyes: Negative for visual disturbance.   Respiratory: Negative for shortness of breath.    Cardiovascular: Negative for chest pain.   Gastrointestinal: Negative for nausea and vomiting.   Endocrine: Negative for cold intolerance and heat intolerance.   Genitourinary: Negative for dysuria and hematuria.   Musculoskeletal: Positive for gait problem.   Allergic/Immunologic: Negative for environmental allergies and food allergies.   Neurological: Positive for weakness. Negative for tremors.   Hematological: Negative for adenopathy. Does not bruise/bleed easily.   Psychiatric/Behavioral: Negative for agitation, confusion and hallucinations.     Objective:     Vital Signs (Most Recent):  Temp: 98.1 °F (36.7 °C) (12/11/20 0846)  Pulse: 70 (12/11/20 0846)  Resp: 18 (12/11/20 0846)  BP: (!) 148/92 (12/11/20 0851)  SpO2: 96 % (12/11/20 0846)    Vital Signs Range (Last 24H):  Temp:  [97.5 °F (36.4 °C)-99.1 °F (37.3 °C)]   Pulse:  [64-85]   Resp:  [18-20]   BP: (106-184)/(55-97)   SpO2:  [93 %-100 %]     Physical Exam  Vitals signs and nursing note reviewed.   Constitutional:       General: He is not in acute distress.     Appearance: He is well-developed. He is not diaphoretic.   Eyes:      Extraocular Movements:      Right eye: Abnormal extraocular motion (chronic since childhood) present.      Left eye: Abnormal extraocular motion (chronic since childhood) present.      Conjunctiva/sclera: Conjunctivae normal.      Pupils:       Right eye: Pupil is not round.   Neck:      Musculoskeletal: Normal range of motion and neck supple.   Cardiovascular:      Rate and Rhythm: Normal rate and regular rhythm.   Pulmonary:      Breath sounds: Normal breath sounds.   Abdominal:      Palpations: Abdomen is soft.   Skin:     General: Skin is warm and dry.      Capillary Refill: Capillary refill takes less than 2 seconds.   Neurological:      Mental Status: He is alert and oriented to person, place, and time.      Motor: Weakness present.         Neurological Exam:   LOC: alert  Attention Span: Good   Language: No aphasia  Articulation: No dysarthria  Orientation: Person, Place, Time   Facial Movement (CN VII): right facial droop   Motor: Arm left  Normal 4/5  Leg left  Paresis: 5/5  Arm right  Normal 5/5  Leg right Normal 5/5  Sensation: Intact to light touch, temperature and vibration      Laboratory:  CMP:   Recent Labs   Lab 12/11/20  0335   CALCIUM 8.4*   ALBUMIN 3.5   PROT 6.0      K 3.5   CO2 22*      BUN 16   CREATININE 1.2   ALKPHOS 83   ALT 31   AST 47*   BILITOT 1.4*     CBC:   Recent Labs   Lab 12/11/20  0335   WBC 7.86   RBC 4.14*   HGB 13.3*   HCT 40.7   PLT 96*   MCV 98   MCH 32.1*   MCHC 32.7     Lipid Panel:   Recent Labs   Lab 12/09/20  0233   CHOL 71*   LDLCALC 31.6*   HDL 27*   TRIG 62     Coagulation:   Recent Labs   Lab 12/08/20  2104   INR 1.1   APTT 24.8     Hgb A1C:   Recent Labs   Lab 12/09/20  0233   HGBA1C 5.2     TSH:   Recent Labs   Lab 12/09/20  0233   TSH 0.184*       Diagnostic Results:      Brain imaging:  CTH 12/8/2020   2.5 cm right basal ganglia hemorrhage at the level of the thalamus/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  There is no given history of hypertension and correlation is needed with follow-up recommended to exclude other etiology for hemorrhage.  There is mild 6 mm right to left shift of midline structures but no significant herniation.  No evidence of intraventricular  hemorrhage, hydrocephalus or other sick convincing acute finding.  Extensive white matter low density likely is related to chronic small vessel ischemic changes. Component of white matter infarct is not excluded.  MRI with diffusion imaging could further evaluate.  No evidence of major arterial infarction.  Atrophy and additional incidental nonacute findings as above.      Vessel Imaging:  CTA Head and Neck 12/9/20  Right thalamic parenchymal hemorrhage with no new or worsening regional mass effect compared to 12/08/2020 head CT.  No new hemorrhage is elsewhere.     Unremarkable CTA of the head and neck.    Cardiac Evaluation:   TTE 12/9/20  · The left ventricle is normal in size with normal systolic function. The estimated ejection fraction is 65%.  · Normal left ventricular diastolic function.  · Normal right ventricular size with normal right ventricular systolic function.  · Normal central venous pressure (3 mmHg).  · The estimated PA systolic pressure is 29 mmHg.

## 2020-12-11 NOTE — SUBJECTIVE & OBJECTIVE
Interval History: NAEON. Neurologically stable.     Medications:  Continuous Infusions:  Scheduled Meds:   amLODIPine  5 mg Oral Daily    atorvastatin  20 mg Oral Daily    heparin (porcine)  5,000 Units Subcutaneous Q8H    levothyroxine  200 mcg Oral Before breakfast    metoprolol succinate  100 mg Oral Daily    polyethylene glycol  17 g Oral Daily    senna-docusate 8.6-50 mg  1 tablet Oral BID    tamsulosin  1 capsule Oral Daily     PRN Meds:acetaminophen, labetalol, ondansetron, oxyCODONE, sodium chloride 0.9%     Review of Systems  Objective:     Weight: 91.1 kg (200 lb 13.4 oz)  Body mass index is 33.42 kg/m².  Vital Signs (Most Recent):  Temp: 97.3 °F (36.3 °C) (12/11/20 1213)  Pulse: 81 (12/11/20 1213)  Resp: 18 (12/11/20 1213)  BP: (!) 152/69 (12/11/20 1213)  SpO2: (!) 92 % (12/11/20 1213) Vital Signs (24h Range):  Temp:  [97.3 °F (36.3 °C)-99.1 °F (37.3 °C)] 97.3 °F (36.3 °C)  Pulse:  [64-81] 81  Resp:  [18-20] 18  SpO2:  [92 %-100 %] 92 %  BP: (142-184)/(69-97) 152/69                Neurosurgery Physical Exam  AAOx4  CN grossly intact  Pupils reactive 2mm,  coloboma R eye   L gaze preference in left eye baseline, facial droop  Follows commands x4  4/5 LUE with drift o/w 5/5  SILT    Significant Labs:  Recent Labs   Lab 12/10/20  0055 12/11/20  0335   * 115*    139   K 3.8 3.5    105   CO2 19* 22*   BUN 19 16   CREATININE 1.3 1.2   CALCIUM 8.4* 8.4*   MG 1.6 1.7     Recent Labs   Lab 12/10/20  0055 12/11/20  0335   WBC 8.02 7.86   HGB 13.9* 13.3*   HCT 41.3 40.7   PLT 97* 96*     No results for input(s): LABPT, INR, APTT in the last 48 hours.  Microbiology Results (last 7 days)     ** No results found for the last 168 hours. **        All pertinent labs from the last 24 hours have been reviewed.    Significant Diagnostics:  I have reviewed all pertinent imaging results/findings within the past 24 hours.

## 2020-12-11 NOTE — CONSULTS
[facility]  Physical Medicine & Rehab  Consult Note    Patient Name: Hever Reyes Jr.  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  Patient presented to [facility] after fall with.  On arrival, found to have R basal ganglia IPH.  He was then transferred to [facility] on 12/9/20 for further evaluation and management.  Admitted to [facility].  Neurosurgery consulted and no acute surgical intervention.  Stepped down to Vascular Neurology; etiology likely hypertension.  PT, OT, and SLP following.  PM&R consulted to assess rehabilitation needs.      Functional History: Patient lives in [place] with his son, SANGEETA, and 3 grandchildren in a single story home without steps to enter.  Prior to admission, he was (I) with ADLs and mobility.  DME: none.    Hospital Course:   12/9/20:  Evaluated by PT, OT, and SLP.  BM David.  Sit to stand CGA.  Ambulated 20 ft CGA.  Feeding CGA, grooming set-upA-David, UBD modA, and LBD maxA.  SLP recommendation: mechanical soft diet and thin liquids.   12/10/20:  Participated with PT and SLP.  BM CGA-David.  Sit to stand David.  Ambulated 9 ft F/B x 6 David.  Found to have dysarthria, visio-spatial impairment, and cognitive-linguistic impairment.  SLP recommendation: regular diet and thin liquids.    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 12/9/2020    Osteoarthritis     Sciatica     Sinusitis      Past Surgical History:   Procedure Laterality Date    CATARACT EXTRACTION      CORONARY ANGIOPLASTY WITH STENT PLACEMENT      LAPAROSCOPIC CHOLECYSTECTOMY N/A 5/6/2019    Procedure: CHOLECYSTECTOMY, LAPAROSCOPIC;  Surgeon: Liam Ordonez MD;  Location: [facility];  Service: General;  Laterality: N/A;  Aminah video confirmed 5/3/dme    PROSTATE BIOPSY       Review of patient's allergies indicates:  No Known Allergies    Scheduled Medications:    amLODIPine  5 mg Oral Daily    atorvastatin  20 mg Oral Daily    heparin (porcine)  5,000 Units Subcutaneous Q8H    levothyroxine  200 mcg Oral Before breakfast    metoprolol succinate  100 mg Oral Daily    senna-docusate 8.6-50 mg  1 tablet Oral BID    tamsulosin  1 capsule Oral Daily       PRN Medications: acetaminophen, labetalol, ondansetron, oxyCODONE, sodium chloride 0.9%    Family History     Family history is unknown by patient.        Tobacco Use    Smoking status: Never Smoker    Smokeless tobacco: Never Used   Substance and Sexual Activity    Alcohol use: No    Drug use: No    Sexual activity: Not on file     Review of Systems   Constitutional: Positive for activity change and fatigue. Negative for chills.   HENT: Positive for hearing loss. Negative for drooling, trouble swallowing and voice change.    Eyes: Negative for pain and visual disturbance.   Respiratory: Negative for cough and shortness of breath.    Cardiovascular: Negative for chest pain and palpitations.   Gastrointestinal: Negative for nausea and vomiting.   Genitourinary: Negative for difficulty urinating and flank pain.   Musculoskeletal: Positive for gait problem. Negative for back pain and myalgias.   Skin: Negative for rash  and wound.   Neurological: Positive for weakness. Negative for dizziness, numbness and headaches.   Psychiatric/Behavioral: Negative for agitation. The patient is not nervous/anxious.      Objective:     Vital Signs (Most Recent):  Temp: 97.3 °F (36.3 °C) (12/11/20 1213)  Pulse: 81 (12/11/20 1213)  Resp: 18 (12/11/20 1213)  BP: (!) 152/69 (12/11/20 1213)  SpO2: (!) 92 % (12/11/20 1213)    Vital Signs (24h Range):  Temp:  [97.3 °F (36.3 °C)-99.1 °F (37.3 °C)] 97.3 °F (36.3 °C)  Pulse:  [64-81] 81  Resp:  [18-20] 18  SpO2:  [92 %-100 %] 92 %  BP: (106-184)/(55-97) 152/69     Body mass index is 33.42 kg/m².    Physical Exam  Vitals signs reviewed.   Constitutional:       General: He is sleeping. He is not in acute distress.     Appearance: He is well-developed.   HENT:      Head: Normocephalic and atraumatic.      Right Ear: External ear normal. Decreased hearing noted.      Left Ear: External ear normal. Decreased hearing noted.      Nose: Nose normal.   Eyes:      General: No scleral icterus.        Right eye: No discharge.         Left eye: No discharge.      Extraocular Movements:      Right eye: Abnormal extraocular motion present.      Left eye: Abnormal extraocular motion present.   Neck:      Musculoskeletal: Normal range of motion.   Cardiovascular:      Rate and Rhythm: Normal rate.   Pulmonary:      Effort: Pulmonary effort is normal. No respiratory distress.   Abdominal:      General: There is no distension.      Palpations: Abdomen is soft.      Tenderness: There is no abdominal tenderness.   Musculoskeletal: Normal range of motion.         General: No tenderness.   Skin:     General: Skin is warm and dry.      Findings: No rash.   Neurological:      Mental Status: He is easily aroused.      Motor: Weakness present.   Psychiatric:         Mood and Affect: Mood normal.         Behavior: Behavior normal.     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    Recommendations  -  Encourage mobility, OOB in chair, and early ambulation as appropriate   -  PT, OT, SLP evaluate and treat  -  Monitor mood and sleep disturbances and establish consistent sleep-wake cycle  -  Monitor for bowel and bladder dysfunction  -  Monitor for shoulder pain/subluxation and spasticity  -  DVT prophylaxis if appropriate  -  Monitor for and prevent skin breakdown and pressure ulcers  · Early mobility, repositioning/weight shifting every 20-30 minutes when sitting, turn patient every 2 hours, proper mattress/overlay and chair cushioning, pressure relief/heel protector boots    Recommend inpatient rehab.  Primary team notified.     Thank you for your consult.     LAWRENCE Batista  Department of Physical Medicine & Rehab  [facility]

## 2020-12-11 NOTE — TELEPHONE ENCOUNTER
----- Message from Gloria Estrada PA-C sent at 12/11/2020  2:57 PM CST -----  Please schedule patient for follow up in 4-6 weeks with repeat CTH and MRI brain w/o contrast (ordered)

## 2020-12-11 NOTE — PROGRESS NOTES
[facility]  Vascular Neurology  Comprehensive Stroke Center  Progress Note    Assessment/Plan:     * 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 is a 80 y.o. male with a significant medical history of BPH, GERD, HLD, HTN, OA, CAD, and hypothyroidism who presents to the hospital as a transfer from [facility] for evaluation of ICH.  He states his leg gave out causing him to fall and he was unable to get up without assistance.  A CTH revealed a right basal ganglia hemorrhage. CTA H&N showed R basal ganglia changes but was otherwise unremarkable. Etiology HTN.     Antithrombotics for secondary stroke prevention: Antiplatelets: None: Intracerebral Hemorrhage    Statins for secondary stroke prevention and hyperlipidemia, if present:   Statins: Atorvastatin- 20 mg daily    Aggressive risk factor modification: HTN, HLD, CAD     Rehab efforts: The patient has been evaluated by a stroke team provider and the therapy needs have been fully considered based off the presenting complaints and exam findings. The following therapy evaluations are needed: PT evaluate and treat, OT evaluate and treat, SLP evaluate and treat, PM&R evaluate for appropriate placement - recommending rehab placement     Diagnostics ordered/pending: NA    VTE prophylaxis: Mechanical prophylaxis: heparin sbq   None: Reason for No Pharmacological VTE Prophylaxis: History of systemic or intracranial bleeding    BP parameters: ICH: SBP <160        Mixed hyperlipidemia  Stroke risk factor.  The patient is prescribed atorvastatin 80 mg daily.  LDL 31.6.  Atorvastatin 20 mg daily    Vasogenic brain edema  -Moderate area of vasogenic cerebral edema identified when reviewing brain imaging in the territory of the R middle cerebral artery. There is mass effect associated with it. We will continue to monitor the patients clinical exam for any worsening of symptoms which may indicate  expansion of the stroke or the area of the edema resulting in the clinical change. The pattern is suggestive of hypertensive etiology.            CAD (coronary artery disease)  Stroke risk factor  Hold ASA for now 2/2 ICH    Hypothyroidism  Stroke risk factor  Continue home levothyroxine    Essential hypertension  Stroke risk factor  SBP <160   Metoprolol 100 mg ER  Norvasc 5 mg started 12/11         12/10: NAEON; patient neuro exam stable; plan to step down to vascular neurology today  12/11: Patient stepped down overnight. BP elevated - switched metoprolol to home dose and started Norvasc 5 mg. Will titrate up as needed. Waiting on rehab placement.     STROKE DOCUMENTATION        NIH Scale:  1a. Level of Consciousness: 0-->Alert, keenly responsive  1b. LOC Questions: 0-->Answers both questions correctly  1c. LOC Commands: 0-->Performs both tasks correctly  2. Best Gaze: 1-->Partial gaze palsy, gaze is abnormal in one or both eyes, but forced deviation or total gaze paresis is not present(baseline )  3. Visual: 0-->No visual loss  4. Facial Palsy: 1-->Minor paralysis (flattened nasolabial fold, asymmetry on smiling)  5a. Motor Arm, Left: 0-->No drift, limb holds 90 (or 45) degrees for full 10 secs  5b. Motor Arm, Right: 1-->Drift, limb holds 90 (or 45) degrees, but drifts down before full 10 secs, does not hit bed or other support  6a. Motor Leg, Left: 0-->No drift, leg holds 30 degree position for full 5 secs  6b. Motor Leg, Right: 0-->No drift, leg holds 30 degree position for full 5 secs  7. Limb Ataxia: 0-->Absent  8. Sensory: 0-->Normal, no sensory loss  9. Best Language: 0-->No aphasia, normal  10. Dysarthria: 0-->Normal  11. Extinction and Inattention (formerly Neglect): 0-->No abnormality  Total (NIH Stroke Scale): 3       Modified Bibb Score: 1  [place] Coma Scale:    ABCD2 Score:    [id number] Score:   HAS -BLED Score:   ICH Score:0  Hunt & Banegas Classification:      Hemorrhagic change of an Ischemic  Stroke: Does this patient have an ischemic stroke with hemorrhagic changes? No     Neurologic Chief Complaint: R Basal Ganglia Hemorrhage    Subjective:     Interval History: Patient is seen for follow-up neurological assessment and treatment recommendations:     Patient stepped down overnight. BP elevated - switched metoprolol to home dose and started Norvasc 5 mg. Will titrate up as needed. Waiting on rehab placement.     HPI, Past Medical, Family, and Social History remains the same as documented in the initial encounter.      Review of Systems   Constitutional: Negative for fever.   Eyes: Negative for visual disturbance.   Respiratory: Negative for shortness of breath.    Cardiovascular: Negative for chest pain.   Gastrointestinal: Negative for nausea and vomiting.   Endocrine: Negative for cold intolerance and heat intolerance.   Genitourinary: Negative for dysuria and hematuria.   Musculoskeletal: Positive for gait problem.   Allergic/Immunologic: Negative for environmental allergies and food allergies.   Neurological: Positive for weakness. Negative for tremors.   Hematological: Negative for adenopathy. Does not bruise/bleed easily.   Psychiatric/Behavioral: Negative for agitation, confusion and hallucinations.     Objective:     Vital Signs (Most Recent):  Temp: 98.1 °F (36.7 °C) (12/11/20 0846)  Pulse: 70 (12/11/20 0846)  Resp: 18 (12/11/20 0846)  BP: (!) 148/92 (12/11/20 0851)  SpO2: 96 % (12/11/20 0846)    Vital Signs Range (Last 24H):  Temp:  [97.5 °F (36.4 °C)-99.1 °F (37.3 °C)]   Pulse:  [64-85]   Resp:  [18-20]   BP: (106-184)/(55-97)   SpO2:  [93 %-100 %]     Physical Exam  Vitals signs and nursing note reviewed.   Constitutional:       General: He is not in acute distress.     Appearance: He is well-developed. He is not diaphoretic.   Eyes:      Extraocular Movements:      Right eye: Abnormal extraocular motion (chronic since childhood) present.      Left eye: Abnormal extraocular motion (chronic  since childhood) present.      Conjunctiva/sclera: Conjunctivae normal.      Pupils:      Right eye: Pupil is not round.   Neck:      Musculoskeletal: Normal range of motion and neck supple.   Cardiovascular:      Rate and Rhythm: Normal rate and regular rhythm.   Pulmonary:      Breath sounds: Normal breath sounds.   Abdominal:      Palpations: Abdomen is soft.   Skin:     General: Skin is warm and dry.      Capillary Refill: Capillary refill takes less than 2 seconds.   Neurological:      Mental Status: He is alert and oriented to person, place, and time.      Motor: Weakness present.         Neurological Exam:   LOC: alert  Attention Span: Good   Language: No aphasia  Articulation: No dysarthria  Orientation: Person, Place, Time   Facial Movement (CN VII): right facial droop   Motor: Arm left  Normal 4/5  Leg left  Paresis: 5/5  Arm right  Normal 5/5  Leg right Normal 5/5  Sensation: Intact to light touch, temperature and vibration      Laboratory:  CMP:   Recent Labs   Lab 12/11/20  0335   CALCIUM 8.4*   ALBUMIN 3.5   PROT 6.0      K 3.5   CO2 22*      BUN 16   CREATININE 1.2   ALKPHOS 83   ALT 31   AST 47*   BILITOT 1.4*     CBC:   Recent Labs   Lab 12/11/20  0335   WBC 7.86   RBC 4.14*   HGB 13.3*   HCT 40.7   PLT 96*   MCV 98   MCH 32.1*   MCHC 32.7     Lipid Panel:   Recent Labs   Lab 12/09/20  0233   CHOL 71*   LDLCALC 31.6*   HDL 27*   TRIG 62     Coagulation:   Recent Labs   Lab 12/08/20  2104   INR 1.1   APTT 24.8     Hgb A1C:   Recent Labs   Lab 12/09/20  0233   HGBA1C 5.2     TSH:   Recent Labs   Lab 12/09/20  0233   TSH 0.184*       Diagnostic Results:      Brain imaging:  [facility] 12/8/2020   2.5 cm right basal ganglia hemorrhage at the level of the thalamus/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  There is no given history of hypertension and correlation is needed with follow-up recommended to exclude other etiology for hemorrhage.  There is mild 6 mm right to left shift of  midline structures but no significant herniation.  No evidence of intraventricular hemorrhage, hydrocephalus or other sick convincing acute finding.  Extensive white matter low density likely is related to chronic small vessel ischemic changes. Component of white matter infarct is not excluded.  MRI with diffusion imaging could further evaluate.  No evidence of major arterial infarction.  Atrophy and additional incidental nonacute findings as above.      Vessel Imaging:  CTA Head and Neck 12/9/20  Right thalamic parenchymal hemorrhage with no new or worsening regional mass effect compared to 12/08/2020 head CT.  No new hemorrhage is elsewhere.     Unremarkable CTA of the head and neck.    Cardiac Evaluation:   TTE 12/9/20  · The left ventricle is normal in size with normal systolic function. The estimated ejection fraction is 65%.  · Normal left ventricular diastolic function.  · Normal right ventricular size with normal right ventricular systolic function.  · Normal central venous pressure (3 mmHg).  · The estimated PA systolic pressure is 29 mmHg.      Maia Gerardo NP  Comprehensive Stroke Center  Department of Vascular Neurology   [facility]

## 2020-12-11 NOTE — ASSESSMENT & PLAN NOTE
-2/2 stroke  -(I) with ADLs and mobility at baseline  See hospital course for functional, cognitive/speech/language, and nutrition/swallow status.    Recommendations  -  Encourage mobility, OOB in chair, and early ambulation as appropriate   -  PT, OT, SLP evaluate and treat  -  Monitor mood and sleep disturbances and establish consistent sleep-wake cycle  -  Monitor for bowel and bladder dysfunction  -  Monitor for shoulder pain/subluxation and spasticity  -  DVT prophylaxis if appropriate  -  Monitor for and prevent skin breakdown and pressure ulcers  · Early mobility, repositioning/weight shifting every 20-30 minutes when sitting, turn patient every 2 hours, proper mattress/overlay and chair cushioning, pressure relief/heel protector boots

## 2020-12-11 NOTE — ASSESSMENT & PLAN NOTE
Hever Reyes Jr. is a 80 y.o. male with a significant medical history of BPH, GERD, HLD, HTN, OA, CAD, and hypothyroidism who presents to the hospital as a transfer from [facility] for evaluation of ICH.  He states his leg gave out causing him to fall and he was unable to get up without assistance.  A CTH revealed a right basal ganglia hemorrhage. CTA H&N showed R basal ganglia changes but was otherwise unremarkable. Etiology HTN.     Antithrombotics for secondary stroke prevention: Antiplatelets: None: Intracerebral Hemorrhage    Statins for secondary stroke prevention and hyperlipidemia, if present:   Statins: Atorvastatin- 20 mg daily    Aggressive risk factor modification: HTN, HLD, CAD     Rehab efforts: The patient has been evaluated by a stroke team provider and the therapy needs have been fully considered based off the presenting complaints and exam findings. The following therapy evaluations are needed: PT evaluate and treat, OT evaluate and treat, SLP evaluate and treat, PM&R evaluate for appropriate placement - recommending rehab placement     Diagnostics ordered/pending: NA    VTE prophylaxis: Mechanical prophylaxis: heparin sbq   None: Reason for No Pharmacological VTE Prophylaxis: History of systemic or intracranial bleeding    BP parameters: ICH: SBP <160

## 2020-12-11 NOTE — ASSESSMENT & PLAN NOTE
-CTH impression showed R basal ganglia IPH  -Neurosurgery consulted--> no acute surgical intervention  -management per Vascular Neurology; etiology likely hypertension

## 2020-12-12 LAB
ALBUMIN SERPL BCP-MCNC: 3.6 G/DL (ref 3.5–5.2)
ALP SERPL-CCNC: 77 U/L (ref 55–135)
ALT SERPL W/O P-5'-P-CCNC: 30 U/L (ref 10–44)
ANION GAP SERPL CALC-SCNC: 12 MMOL/L (ref 8–16)
AST SERPL-CCNC: 37 U/L (ref 10–40)
BASOPHILS # BLD AUTO: 0.05 K/UL (ref 0–0.2)
BASOPHILS NFR BLD: 0.7 % (ref 0–1.9)
BILIRUB SERPL-MCNC: 1.6 MG/DL (ref 0.1–1)
BUN SERPL-MCNC: 12 MG/DL (ref 8–23)
CALCIUM SERPL-MCNC: 8.6 MG/DL (ref 8.7–10.5)
CHLORIDE SERPL-SCNC: 105 MMOL/L (ref 95–110)
CO2 SERPL-SCNC: 21 MMOL/L (ref 23–29)
CREAT SERPL-MCNC: 1.1 MG/DL (ref 0.5–1.4)
DIFFERENTIAL METHOD: ABNORMAL
EOSINOPHIL # BLD AUTO: 0.3 K/UL (ref 0–0.5)
EOSINOPHIL NFR BLD: 3.6 % (ref 0–8)
ERYTHROCYTE [DISTWIDTH] IN BLOOD BY AUTOMATED COUNT: 12.9 % (ref 11.5–14.5)
EST. GFR  (AFRICAN AMERICAN): >60 ML/MIN/1.73 M^2
EST. GFR  (NON AFRICAN AMERICAN): >60 ML/MIN/1.73 M^2
GLUCOSE SERPL-MCNC: 108 MG/DL (ref 70–110)
HCT VFR BLD AUTO: 41.1 % (ref 40–54)
HGB BLD-MCNC: 14.2 G/DL (ref 14–18)
IMM GRANULOCYTES # BLD AUTO: 0.02 K/UL (ref 0–0.04)
IMM GRANULOCYTES NFR BLD AUTO: 0.3 % (ref 0–0.5)
LYMPHOCYTES # BLD AUTO: 1.3 K/UL (ref 1–4.8)
LYMPHOCYTES NFR BLD: 19 % (ref 18–48)
MCH RBC QN AUTO: 32.9 PG (ref 27–31)
MCHC RBC AUTO-ENTMCNC: 34.5 G/DL (ref 32–36)
MCV RBC AUTO: 95 FL (ref 82–98)
MONOCYTES # BLD AUTO: 0.6 K/UL (ref 0.3–1)
MONOCYTES NFR BLD: 8.7 % (ref 4–15)
NEUTROPHILS # BLD AUTO: 4.7 K/UL (ref 1.8–7.7)
NEUTROPHILS NFR BLD: 67.7 % (ref 38–73)
NRBC BLD-RTO: 0 /100 WBC
PLATELET # BLD AUTO: 108 K/UL (ref 150–350)
PMV BLD AUTO: 11.1 FL (ref 9.2–12.9)
POTASSIUM SERPL-SCNC: 3.7 MMOL/L (ref 3.5–5.1)
PROT SERPL-MCNC: 6.5 G/DL (ref 6–8.4)
RBC # BLD AUTO: 4.31 M/UL (ref 4.6–6.2)
SODIUM SERPL-SCNC: 138 MMOL/L (ref 136–145)
WBC # BLD AUTO: 6.88 K/UL (ref 3.9–12.7)

## 2020-12-12 PROCEDURE — 99232 SBSQ HOSP IP/OBS MODERATE 35: CPT | Mod: ,,, | Performed by: PHYSICIAN ASSISTANT

## 2020-12-12 PROCEDURE — 99233 SBSQ HOSP IP/OBS HIGH 50: CPT | Mod: ,,, | Performed by: PSYCHIATRY & NEUROLOGY

## 2020-12-12 PROCEDURE — 25000003 PHARM REV CODE 250: Performed by: INTERNAL MEDICINE

## 2020-12-12 PROCEDURE — 11000001 HC ACUTE MED/SURG PRIVATE ROOM

## 2020-12-12 PROCEDURE — 25000003 PHARM REV CODE 250: Performed by: PHYSICIAN ASSISTANT

## 2020-12-12 PROCEDURE — 99233 PR SUBSEQUENT HOSPITAL CARE,LEVL III: ICD-10-PCS | Mod: ,,, | Performed by: PSYCHIATRY & NEUROLOGY

## 2020-12-12 PROCEDURE — 25000003 PHARM REV CODE 250: Performed by: NURSE PRACTITIONER

## 2020-12-12 PROCEDURE — 36415 COLL VENOUS BLD VENIPUNCTURE: CPT

## 2020-12-12 PROCEDURE — 99232 PR SUBSEQUENT HOSPITAL CARE,LEVL II: ICD-10-PCS | Mod: ,,, | Performed by: PHYSICIAN ASSISTANT

## 2020-12-12 PROCEDURE — 80053 COMPREHEN METABOLIC PANEL: CPT

## 2020-12-12 PROCEDURE — 85025 COMPLETE CBC W/AUTO DIFF WBC: CPT

## 2020-12-12 PROCEDURE — 63600175 PHARM REV CODE 636 W HCPCS: Performed by: NURSE PRACTITIONER

## 2020-12-12 RX ORDER — AMLODIPINE BESYLATE 10 MG/1
10 TABLET ORAL DAILY
Status: DISCONTINUED | OUTPATIENT
Start: 2020-12-12 | End: 2020-12-14 | Stop reason: HOSPADM

## 2020-12-12 RX ADMIN — METOPROLOL SUCCINATE 100 MG: 100 TABLET, EXTENDED RELEASE ORAL at 08:12

## 2020-12-12 RX ADMIN — POLYETHYLENE GLYCOL 3350 17 G: 17 POWDER, FOR SOLUTION ORAL at 08:12

## 2020-12-12 RX ADMIN — DOCUSATE SODIUM 50MG AND SENNOSIDES 8.6MG 1 TABLET: 8.6; 5 TABLET, FILM COATED ORAL at 08:12

## 2020-12-12 RX ADMIN — ACETAMINOPHEN 650 MG: 325 TABLET ORAL at 12:12

## 2020-12-12 RX ADMIN — TAMSULOSIN HYDROCHLORIDE 0.4 MG: 0.4 CAPSULE ORAL at 08:12

## 2020-12-12 RX ADMIN — LEVOTHYROXINE SODIUM 200 MCG: 100 TABLET ORAL at 06:12

## 2020-12-12 RX ADMIN — HEPARIN SODIUM 5000 UNITS: 5000 INJECTION INTRAVENOUS; SUBCUTANEOUS at 06:12

## 2020-12-12 RX ADMIN — ATORVASTATIN CALCIUM 20 MG: 20 TABLET, FILM COATED ORAL at 08:12

## 2020-12-12 RX ADMIN — DOCUSATE SODIUM 50MG AND SENNOSIDES 8.6MG 1 TABLET: 8.6; 5 TABLET, FILM COATED ORAL at 10:12

## 2020-12-12 RX ADMIN — AMLODIPINE BESYLATE 10 MG: 10 TABLET ORAL at 08:12

## 2020-12-12 RX ADMIN — HEPARIN SODIUM 5000 UNITS: 5000 INJECTION INTRAVENOUS; SUBCUTANEOUS at 01:12

## 2020-12-12 RX ADMIN — HEPARIN SODIUM 5000 UNITS: 5000 INJECTION INTRAVENOUS; SUBCUTANEOUS at 10:12

## 2020-12-12 NOTE — PROGRESS NOTES
[facility]  Vascular Neurology  Comprehensive Stroke Center  Progress Note    Assessment/Plan:     * 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 is a 80 y.o. male with a significant medical history of BPH, GERD, HLD, HTN, OA, CAD, and hypothyroidism who presents to the hospital as a transfer from [facility] for evaluation of ICH.  He states his leg gave out causing him to fall and he was unable to get up without assistance.  A CTH revealed a right basal ganglia hemorrhage. CTA H&N showed R basal ganglia changes but was otherwise unremarkable. Etiology HTN. MRI w and wo ordered by NSGY - can not exclude underlying mass lesion. Patient to follow up in NSGY clinic 4-6 weeks for repeat MRI w wo contrast per their recommendations.     Antithrombotics for secondary stroke prevention: Antiplatelets: None: Intracerebral Hemorrhage    Statins for secondary stroke prevention and hyperlipidemia, if present:   Statins: Atorvastatin- 20 mg daily    Aggressive risk factor modification: HTN, HLD, CAD     Rehab efforts: The patient has been evaluated by a stroke team provider and the therapy needs have been fully considered based off the presenting complaints and exam findings. The following therapy evaluations are needed: PT evaluate and treat, OT evaluate and treat, SLP evaluate and treat, PM&R evaluate for appropriate placement - recommending rehab placement     Diagnostics ordered/pending: NA    VTE prophylaxis: Mechanical prophylaxis: heparin sbq   None: Reason for No Pharmacological VTE Prophylaxis: History of systemic or intracranial bleeding    BP parameters: ICH: SBP <160        Mixed hyperlipidemia  Stroke risk factor.  The patient is prescribed atorvastatin 80 mg daily.  LDL 31.6.  Atorvastatin 20 mg daily    Vasogenic brain edema  -Moderate area of vasogenic cerebral edema identified when reviewing brain imaging in the territory of the R  middle cerebral artery. There is mass effect associated with it. We will continue to monitor the patients clinical exam for any worsening of symptoms which may indicate expansion of the stroke or the area of the edema resulting in the clinical change. The pattern is suggestive of hypertensive etiology.            CAD (coronary artery disease)  Stroke risk factor  Hold ASA for now 2/2 ICH    Hypothyroidism  Stroke risk factor  Continue home levothyroxine    Essential hypertension  Stroke risk factor  SBP <160   Metoprolol 100 mg ER  Norvasc 5 mg started 12/11, increased to 10 mg          12/10: NAEON; patient neuro exam stable; plan to step down to vascular neurology today  12/11: Patient stepped down overnight. BP elevated - switched metoprolol to home dose and started Norvasc 5 mg. Will titrate up as needed. Waiting on rehab placement.   12/12: NAEON. Increasing Norvasc to 10 mg. Pending rehab placement.     STROKE DOCUMENTATION        NIH Scale:  1a. Level of Consciousness: 0-->Alert, keenly responsive  1b. LOC Questions: 0-->Answers both questions correctly  1c. LOC Commands: 0-->Performs both tasks correctly  2. Best Gaze: 1-->Partial gaze palsy, gaze is abnormal in one or both eyes, but forced deviation or total gaze paresis is not present(baseline )  3. Visual: 0-->No visual loss  4. Facial Palsy: 1-->Minor paralysis (flattened nasolabial fold, asymmetry on smiling)  5a. Motor Arm, Left: 0-->No drift, limb holds 90 (or 45) degrees for full 10 secs  5b. Motor Arm, Right: 1-->Drift, limb holds 90 (or 45) degrees, but drifts down before full 10 secs, does not hit bed or other support  6a. Motor Leg, Left: 0-->No drift, leg holds 30 degree position for full 5 secs  6b. Motor Leg, Right: 0-->No drift, leg holds 30 degree position for full 5 secs  7. Limb Ataxia: 0-->Absent  8. Sensory: 0-->Normal, no sensory loss  9. Best Language: 0-->No aphasia, normal  10. Dysarthria: 0-->Normal  11. Extinction and Inattention  (formerly Neglect): 0-->No abnormality  Total (NIH Stroke Scale): 3       Modified Carlisle Score: 1  [place] Coma Scale:    ABCD2 Score:    [id number] Score:   HAS -BLED Score:   ICH Score:0  Hunt & Banegas Classification:      Hemorrhagic change of an Ischemic Stroke: Does this patient have an ischemic stroke with hemorrhagic changes? No     Neurologic Chief Complaint: R Basal Ganglia Hemorrhage    Subjective:     Interval History: Patient is seen for follow-up neurological assessment and treatment recommendations:     KIRBY. Increasing Norvasc to 10 mg. Pending rehab placement.     HPI, Past Medical, Family, and Social History remains the same as documented in the initial encounter.      Review of Systems   Constitutional: Negative for fever.   Eyes: Negative for visual disturbance.   Respiratory: Negative for shortness of breath.    Cardiovascular: Negative for chest pain.   Gastrointestinal: Negative for nausea and vomiting.   Endocrine: Negative for cold intolerance and heat intolerance.   Genitourinary: Negative for dysuria and hematuria.   Musculoskeletal: Positive for gait problem.   Allergic/Immunologic: Negative for environmental allergies and food allergies.   Neurological: Positive for weakness. Negative for tremors.   Hematological: Negative for adenopathy. Does not bruise/bleed easily.   Psychiatric/Behavioral: Negative for agitation, confusion and hallucinations.     Objective:     Vital Signs (Most Recent):  Temp: 97.6 °F (36.4 °C) (12/12/20 0931)  Pulse: 68 (12/12/20 0931)  Resp: 18 (12/12/20 0931)  BP: (!) 169/95 (12/12/20 0931)  SpO2: 96 % (12/12/20 0931)    Vital Signs Range (Last 24H):  Temp:  [97.3 °F (36.3 °C)-99.1 °F (37.3 °C)]   Pulse:  [59-81]   Resp:  [16-18]   BP: (152-169)/(67-95)   SpO2:  [92 %-96 %]     Physical Exam  Vitals signs and nursing note reviewed.   Constitutional:       General: He is not in acute distress.     Appearance: He is well-developed. He is not diaphoretic.   Eyes:       Extraocular Movements:      Right eye: Abnormal extraocular motion (chronic since childhood) present.      Left eye: Abnormal extraocular motion (chronic since childhood) present.      Conjunctiva/sclera: Conjunctivae normal.      Pupils:      Right eye: Pupil is not round.   Neck:      Musculoskeletal: Normal range of motion and neck supple.   Cardiovascular:      Rate and Rhythm: Normal rate and regular rhythm.   Pulmonary:      Breath sounds: Normal breath sounds.   Abdominal:      Palpations: Abdomen is soft.   Skin:     General: Skin is warm and dry.      Capillary Refill: Capillary refill takes less than 2 seconds.   Neurological:      Mental Status: He is alert and oriented to person, place, and time.      Motor: Weakness present.         Neurological Exam:   LOC: alert  Attention Span: Good   Language: No aphasia  Articulation: No dysarthria  Orientation: Person, Place, Time   Facial Movement (CN VII): right facial droop   Motor: Arm left  Normal 4/5  Leg left  Paresis: 5/5  Arm right  Normal 5/5  Leg right Normal 5/5  Sensation: Intact to light touch, temperature and vibration      Laboratory:  CMP:   Recent Labs   Lab 12/12/20  0509   CALCIUM 8.6*   ALBUMIN 3.6   PROT 6.5      K 3.7   CO2 21*      BUN 12   CREATININE 1.1   ALKPHOS 77   ALT 30   AST 37   BILITOT 1.6*     CBC:   Recent Labs   Lab 12/12/20  0509   WBC 6.88   RBC 4.31*   HGB 14.2   HCT 41.1   *   MCV 95   MCH 32.9*   MCHC 34.5     Lipid Panel:   Recent Labs   Lab 12/09/20  0233   CHOL 71*   LDLCALC 31.6*   HDL 27*   TRIG 62     Coagulation:   Recent Labs   Lab 12/08/20  2104   INR 1.1   APTT 24.8     Hgb A1C:   Recent Labs   Lab 12/09/20  0233   HGBA1C 5.2     TSH:   Recent Labs   Lab 12/09/20  0233   TSH 0.184*       Diagnostic Results:      Brain imaging:  CT 12/8/2020   2.5 cm right basal ganglia hemorrhage at the level of the thalamus/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  There is no given  history of hypertension and correlation is needed with follow-up recommended to exclude other etiology for hemorrhage.  There is mild 6 mm right to left shift of midline structures but no significant herniation.  No evidence of intraventricular hemorrhage, hydrocephalus or other sick convincing acute finding.  Extensive white matter low density likely is related to chronic small vessel ischemic changes. Component of white matter infarct is not excluded.  MRI with diffusion imaging could further evaluate.  No evidence of major arterial infarction.  Atrophy and additional incidental nonacute findings as above.      Vessel Imaging:  CTA Head and Neck 12/9/20  Right thalamic parenchymal hemorrhage with no new or worsening regional mass effect compared to 12/08/2020 head CT.  No new hemorrhage is elsewhere.     Unremarkable CTA of the head and neck.    Cardiac Evaluation:   TTE 12/9/20  · The left ventricle is normal in size with normal systolic function. The estimated ejection fraction is 65%.  · Normal left ventricular diastolic function.  · Normal right ventricular size with normal right ventricular systolic function.  · Normal central venous pressure (3 mmHg).  · The estimated PA systolic pressure is 29 mmHg.      Maia Gerardo NP  Comprehensive Stroke Center  Department of Vascular Neurology   [facility]

## 2020-12-12 NOTE — SUBJECTIVE & OBJECTIVE
Interval History: NAEON. Hypertensive overnight. Patient neurologically stable, awake and eating breakfast this AM. MRI brain with stable hemorrhage, possible underlying lesion cannot be ruled out. Will need to follow up in neurosurgery clinic in 4-6 weeks with repeat MRI brain w/wo contrast. Discussed with patient that our office will arrange. He is in agreement.     Medications:  Continuous Infusions:  Scheduled Meds:   amLODIPine  10 mg Oral Daily    atorvastatin  20 mg Oral Daily    heparin (porcine)  5,000 Units Subcutaneous Q8H    levothyroxine  200 mcg Oral Before breakfast    metoprolol succinate  100 mg Oral Daily    polyethylene glycol  17 g Oral Daily    senna-docusate 8.6-50 mg  1 tablet Oral BID    tamsulosin  1 capsule Oral Daily     PRN Meds:acetaminophen, labetalol, ondansetron, oxyCODONE, sodium chloride 0.9%     Review of Systems  Objective:     Weight: 91.1 kg (200 lb 13.4 oz)  Body mass index is 33.42 kg/m².  Vital Signs (Most Recent):  Temp: 97.6 °F (36.4 °C) (12/12/20 0931)  Pulse: 68 (12/12/20 0931)  Resp: 18 (12/12/20 0931)  BP: (!) 169/95 (12/12/20 0931)  SpO2: 96 % (12/12/20 0931) Vital Signs (24h Range):  Temp:  [97.3 °F (36.3 °C)-99.1 °F (37.3 °C)] 97.6 °F (36.4 °C)  Pulse:  [59-81] 68  Resp:  [16-18] 18  SpO2:  [92 %-96 %] 96 %  BP: (152-169)/(67-95) 169/95         Neurosurgery Physical Exam  AAOx4  CN grossly intact  Pupils reactive 2mm,  coloboma R eye   L gaze preference in left eye baseline, facial droop  Follows commands x4  4/5 LUE with drift o/w 5/5  SILT     Significant Labs:  Recent Labs   Lab 12/11/20  0335 12/12/20  0509   * 108    138   K 3.5 3.7    105   CO2 22* 21*   BUN 16 12   CREATININE 1.2 1.1   CALCIUM 8.4* 8.6*   MG 1.7  --      Recent Labs   Lab 12/11/20  0335 12/12/20  0509   WBC 7.86 6.88   HGB 13.3* 14.2   HCT 40.7 41.1   PLT 96* 108*     No results for input(s): LABPT, INR, APTT in the last 48 hours.  Microbiology Results (last 7 days)      ** No results found for the last 168 hours. **        All pertinent labs from the last 24 hours have been reviewed.    Significant Diagnostics:  I independently reviewed the imaging.      MRI: Mri Brain W Wo Contrast    Result Date: 12/12/2020  1. Relative to prior CT/CTA examination of 12/09/2020, no substantial change in size or associated mass effect of parenchymal hemorrhage centered in the right thalamus, as described. 2. No convincing evidence of underlying mass lesion or vascular anomaly to account for the hemorrhage.  However note the mass effect related to the hemorrhage could obscure an underlying lesion.  As such, consideration of follow-up contrast-enhanced MRI brain upon resolution of the hemorrhage could be performed to more reliably exclude an underlying lesion. 3. Sequela of chronic/remote ischemia, as described. Electronically signed by: Suleman Mathew Date:    12/12/2020 Time:    08:21

## 2020-12-12 NOTE — ASSESSMENT & PLAN NOTE
Stroke risk factor  SBP <160   Metoprolol 100 mg ER  Norvasc 5 mg started 12/11, increased to 10 mg

## 2020-12-12 NOTE — ASSESSMENT & PLAN NOTE
Hever Reyes Jr. is a 80 y.o. male with a significant medical history of BPH, GERD, HLD, HTN, OA, CAD, and hypothyroidism who presents to the hospital as a transfer from [facility] for evaluation of ICH.  He states his leg gave out causing him to fall and he was unable to get up without assistance.  A CTH revealed a right basal ganglia hemorrhage. CTA H&N showed R basal ganglia changes but was otherwise unremarkable. Etiology HTN. MRI w and wo ordered by NSGY - can not exclude underlying mass lesion. Patient to follow up in NSGY clinic 4-6 weeks for repeat MRI w wo contrast per their recommendations.     Antithrombotics for secondary stroke prevention: Antiplatelets: None: Intracerebral Hemorrhage    Statins for secondary stroke prevention and hyperlipidemia, if present:   Statins: Atorvastatin- 20 mg daily    Aggressive risk factor modification: HTN, HLD, CAD     Rehab efforts: The patient has been evaluated by a stroke team provider and the therapy needs have been fully considered based off the presenting complaints and exam findings. The following therapy evaluations are needed: PT evaluate and treat, OT evaluate and treat, SLP evaluate and treat, PM&R evaluate for appropriate placement - recommending rehab placement     Diagnostics ordered/pending: NA    VTE prophylaxis: Mechanical prophylaxis: heparin sbq   None: Reason for No Pharmacological VTE Prophylaxis: History of systemic or intracranial bleeding    BP parameters: ICH: SBP <160

## 2020-12-12 NOTE — ASSESSMENT & PLAN NOTE
Pt is 80yom w pmh CAD, HTN, hyperthyroid, HLD who presents with multipke falls and recent fall thi morning found down after getting out of bed where he lost balance and strength in his legs, denies LOC. Pt AOx4 and states he feelse generalized weakness. CTH showed acute R BG hemorhhage 2.5cm in size. NSGY consulted for evaluation.     Plan:  Neurologically stable  CTA negative   MRI brain w/wo contrast reviewed, stable hemorrhage, cannot rule out underlying lesion. Will need repeat MRI brain w/wo contrast in 4-6 weeks with clinic follow up, our office will arrange  Neurosurgery signing off, please call with any questions or concerns.

## 2020-12-12 NOTE — PLAN OF CARE
Problem: Fall Injury Risk  Goal: Absence of Fall and Fall-Related Injury  Outcome: Ongoing, Progressing     Problem: Adult Inpatient Plan of Care  Goal: Plan of Care Review  Outcome: Ongoing, Progressing     Problem: Adjustment to Illness (Stroke, Hemorrhagic)  Goal: Optimal Coping  Outcome: Ongoing, Not Progressing     POC reviewed with pt and daughter. Verbalized understanding. VSS. Full assessment in flowsheets. Bed locked in lowest position. Call light within reach. Daughter at bedside. TM

## 2020-12-12 NOTE — PROGRESS NOTES
[facility]  Neurosurgery  Progress Note    Subjective:     History of Present Illness: Pt is 80yom w pmh CAD, HTN, hyperthyroid, HLD who presents with multipke falls and recent fall thi morning found down after getting out of bed where he lost balance and strength in his legs, denies LOC. Pt AOx4 and states he feelse generalized weakness. CTH showed acute R BG hemorhhage 2.5cm in size. NSGY consulted for evaluation.        Post-Op Info:  * No surgery found *         Interval History: NAEON. Hypertensive overnight. Patient neurologically stable, awake and eating breakfast this AM. MRI brain with stable hemorrhage, possible underlying lesion cannot be ruled out. Will need to follow up in neurosurgery clinic in 4-6 weeks with repeat MRI brain w/wo contrast. Discussed with patient that our office will arrange. He is in agreement.     Medications:  Continuous Infusions:  Scheduled Meds:   amLODIPine  10 mg Oral Daily    atorvastatin  20 mg Oral Daily    heparin (porcine)  5,000 Units Subcutaneous Q8H    levothyroxine  200 mcg Oral Before breakfast    metoprolol succinate  100 mg Oral Daily    polyethylene glycol  17 g Oral Daily    senna-docusate 8.6-50 mg  1 tablet Oral BID    tamsulosin  1 capsule Oral Daily     PRN Meds:acetaminophen, labetalol, ondansetron, oxyCODONE, sodium chloride 0.9%     Review of Systems  Objective:     Weight: 91.1 kg (200 lb 13.4 oz)  Body mass index is 33.42 kg/m².  Vital Signs (Most Recent):  Temp: 97.6 °F (36.4 °C) (12/12/20 0931)  Pulse: 68 (12/12/20 0931)  Resp: 18 (12/12/20 0931)  BP: (!) 169/95 (12/12/20 0931)  SpO2: 96 % (12/12/20 0931) Vital Signs (24h Range):  Temp:  [97.3 °F (36.3 °C)-99.1 °F (37.3 °C)] 97.6 °F (36.4 °C)  Pulse:  [59-81] 68  Resp:  [16-18] 18  SpO2:  [92 %-96 %] 96 %  BP: (152-169)/(67-95) 169/95         Neurosurgery Physical Exam  AAOx4  CN grossly intact  Pupils reactive 2mm,  coloboma R eye   L gaze preference in left eye baseline,  facial droop  Follows commands x4  4/5 LUE with drift o/w 5/5  SILT     Significant Labs:  Recent Labs   Lab 12/11/20  0335 12/12/20  0509   * 108    138   K 3.5 3.7    105   CO2 22* 21*   BUN 16 12   CREATININE 1.2 1.1   CALCIUM 8.4* 8.6*   MG 1.7  --      Recent Labs   Lab 12/11/20  0335 12/12/20  0509   WBC 7.86 6.88   HGB 13.3* 14.2   HCT 40.7 41.1   PLT 96* 108*     No results for input(s): LABPT, INR, APTT in the last 48 hours.  Microbiology Results (last 7 days)     ** No results found for the last 168 hours. **        All pertinent labs from the last 24 hours have been reviewed.    Significant Diagnostics:  I independently reviewed the imaging.      MRI: Mri Brain W Wo Contrast    Result Date: 12/12/2020  1. Relative to prior CT/CTA examination of 12/09/2020, no substantial change in size or associated mass effect of parenchymal hemorrhage centered in the right thalamus, as described. 2. No convincing evidence of underlying mass lesion or vascular anomaly to account for the hemorrhage.  However note the mass effect related to the hemorrhage could obscure an underlying lesion.  As such, consideration of follow-up contrast-enhanced MRI brain upon resolution of the hemorrhage could be performed to more reliably exclude an underlying lesion. 3. Sequela of chronic/remote ischemia, as described. Electronically signed by: Suleman Mathew Date:    12/12/2020 Time:    08:21    Assessment/Plan:     * Nontraumatic subcortical hemorrhage of right cerebral hemisphere  Pt is 80yom w pmh CAD, HTN, hyperthyroid, HLD who presents with multipke falls and recent fall thi morning found down after getting out of bed where he lost balance and strength in his legs, denies LOC. Pt AOx4 and states he feelse generalized weakness. CTH showed acute R BG hemorhhage 2.5cm in size. NSGY consulted for evaluation.     Plan:  Neurologically stable  CTA negative   MRI brain w/wo contrast reviewed, stable hemorrhage, cannot  rule out underlying lesion. Will need repeat MRI brain w/wo contrast in 4-6 weeks with clinic follow up, our office will arrange  Neurosurgery signing off, please call with any questions or concerns.               Gloria Estrada PA-C  Neurosurgery  [facility]

## 2020-12-13 LAB
ALBUMIN SERPL BCP-MCNC: 3.5 G/DL (ref 3.5–5.2)
ALP SERPL-CCNC: 74 U/L (ref 55–135)
ALT SERPL W/O P-5'-P-CCNC: 31 U/L (ref 10–44)
ANION GAP SERPL CALC-SCNC: 9 MMOL/L (ref 8–16)
AST SERPL-CCNC: 30 U/L (ref 10–40)
BASOPHILS # BLD AUTO: 0.04 K/UL (ref 0–0.2)
BASOPHILS NFR BLD: 0.7 % (ref 0–1.9)
BILIRUB SERPL-MCNC: 1.5 MG/DL (ref 0.1–1)
BUN SERPL-MCNC: 11 MG/DL (ref 8–23)
CALCIUM SERPL-MCNC: 8.6 MG/DL (ref 8.7–10.5)
CHLORIDE SERPL-SCNC: 101 MMOL/L (ref 95–110)
CO2 SERPL-SCNC: 25 MMOL/L (ref 23–29)
CREAT SERPL-MCNC: 1.1 MG/DL (ref 0.5–1.4)
DIFFERENTIAL METHOD: ABNORMAL
EOSINOPHIL # BLD AUTO: 0.3 K/UL (ref 0–0.5)
EOSINOPHIL NFR BLD: 5.5 % (ref 0–8)
ERYTHROCYTE [DISTWIDTH] IN BLOOD BY AUTOMATED COUNT: 12.7 % (ref 11.5–14.5)
EST. GFR  (AFRICAN AMERICAN): >60 ML/MIN/1.73 M^2
EST. GFR  (NON AFRICAN AMERICAN): >60 ML/MIN/1.73 M^2
GLUCOSE SERPL-MCNC: 107 MG/DL (ref 70–110)
HCT VFR BLD AUTO: 40.1 % (ref 40–54)
HGB BLD-MCNC: 13.7 G/DL (ref 14–18)
IMM GRANULOCYTES # BLD AUTO: 0.01 K/UL (ref 0–0.04)
IMM GRANULOCYTES NFR BLD AUTO: 0.2 % (ref 0–0.5)
LYMPHOCYTES # BLD AUTO: 1.3 K/UL (ref 1–4.8)
LYMPHOCYTES NFR BLD: 22.8 % (ref 18–48)
MCH RBC QN AUTO: 32.9 PG (ref 27–31)
MCHC RBC AUTO-ENTMCNC: 34.2 G/DL (ref 32–36)
MCV RBC AUTO: 96 FL (ref 82–98)
MONOCYTES # BLD AUTO: 0.7 K/UL (ref 0.3–1)
MONOCYTES NFR BLD: 12.2 % (ref 4–15)
NEUTROPHILS # BLD AUTO: 3.2 K/UL (ref 1.8–7.7)
NEUTROPHILS NFR BLD: 58.6 % (ref 38–73)
NRBC BLD-RTO: 0 /100 WBC
PLATELET # BLD AUTO: 114 K/UL (ref 150–350)
PMV BLD AUTO: 10.6 FL (ref 9.2–12.9)
POTASSIUM SERPL-SCNC: 3.8 MMOL/L (ref 3.5–5.1)
PROT SERPL-MCNC: 6.2 G/DL (ref 6–8.4)
RBC # BLD AUTO: 4.17 M/UL (ref 4.6–6.2)
SODIUM SERPL-SCNC: 135 MMOL/L (ref 136–145)
WBC # BLD AUTO: 5.48 K/UL (ref 3.9–12.7)

## 2020-12-13 PROCEDURE — 25000003 PHARM REV CODE 250: Performed by: PHYSICIAN ASSISTANT

## 2020-12-13 PROCEDURE — 25000003 PHARM REV CODE 250: Performed by: INTERNAL MEDICINE

## 2020-12-13 PROCEDURE — 11000001 HC ACUTE MED/SURG PRIVATE ROOM

## 2020-12-13 PROCEDURE — 85025 COMPLETE CBC W/AUTO DIFF WBC: CPT

## 2020-12-13 PROCEDURE — 97116 GAIT TRAINING THERAPY: CPT | Mod: CQ

## 2020-12-13 PROCEDURE — 99233 PR SUBSEQUENT HOSPITAL CARE,LEVL III: ICD-10-PCS | Mod: ,,, | Performed by: PSYCHIATRY & NEUROLOGY

## 2020-12-13 PROCEDURE — 36415 COLL VENOUS BLD VENIPUNCTURE: CPT

## 2020-12-13 PROCEDURE — 99233 SBSQ HOSP IP/OBS HIGH 50: CPT | Mod: ,,, | Performed by: PSYCHIATRY & NEUROLOGY

## 2020-12-13 PROCEDURE — 80053 COMPREHEN METABOLIC PANEL: CPT

## 2020-12-13 PROCEDURE — 25000003 PHARM REV CODE 250: Performed by: NURSE PRACTITIONER

## 2020-12-13 PROCEDURE — 63600175 PHARM REV CODE 636 W HCPCS: Performed by: NURSE PRACTITIONER

## 2020-12-13 RX ORDER — TALC
6 POWDER (GRAM) TOPICAL NIGHTLY
Status: DISCONTINUED | OUTPATIENT
Start: 2020-12-13 | End: 2020-12-14 | Stop reason: HOSPADM

## 2020-12-13 RX ADMIN — ATORVASTATIN CALCIUM 20 MG: 20 TABLET, FILM COATED ORAL at 09:12

## 2020-12-13 RX ADMIN — METOPROLOL SUCCINATE 100 MG: 100 TABLET, EXTENDED RELEASE ORAL at 01:12

## 2020-12-13 RX ADMIN — DOCUSATE SODIUM 50MG AND SENNOSIDES 8.6MG 1 TABLET: 8.6; 5 TABLET, FILM COATED ORAL at 09:12

## 2020-12-13 RX ADMIN — ACETAMINOPHEN 650 MG: 325 TABLET ORAL at 01:12

## 2020-12-13 RX ADMIN — AMLODIPINE BESYLATE 10 MG: 10 TABLET ORAL at 09:12

## 2020-12-13 RX ADMIN — LEVOTHYROXINE SODIUM 200 MCG: 100 TABLET ORAL at 05:12

## 2020-12-13 RX ADMIN — MELATONIN TAB 3 MG 6 MG: 3 TAB at 09:12

## 2020-12-13 RX ADMIN — HEPARIN SODIUM 5000 UNITS: 5000 INJECTION INTRAVENOUS; SUBCUTANEOUS at 05:12

## 2020-12-13 RX ADMIN — TAMSULOSIN HYDROCHLORIDE 0.4 MG: 0.4 CAPSULE ORAL at 09:12

## 2020-12-13 RX ADMIN — HEPARIN SODIUM 5000 UNITS: 5000 INJECTION INTRAVENOUS; SUBCUTANEOUS at 09:12

## 2020-12-13 RX ADMIN — POLYETHYLENE GLYCOL 3350 17 G: 17 POWDER, FOR SOLUTION ORAL at 09:12

## 2020-12-13 RX ADMIN — HEPARIN SODIUM 5000 UNITS: 5000 INJECTION INTRAVENOUS; SUBCUTANEOUS at 03:12

## 2020-12-13 NOTE — PLAN OF CARE
Problem: Fall Injury Risk  Goal: Absence of Fall and Fall-Related Injury  Outcome: Ongoing, Progressing     Problem: Functional Ability Impaired (Stroke, Hemorrhagic)  Goal: Optimal Functional Ability  Outcome: Ongoing, Progressing   Patient aaox3, vss, amb with assist,  sitting in the recliner, urinal at bedside, given am med, POC explained patient verbalized understanding will cont to reinforce.

## 2020-12-13 NOTE — SUBJECTIVE & OBJECTIVE
Neurologic Chief Complaint: R Basal Ganglia Hemorrhage    Subjective:     Interval History: Patient is seen for follow-up neurological assessment and treatment recommendations:      Patient reports unable to sleep at night - melatonin nightly ordered.    HPI, Past Medical, Family, and Social History remains the same as documented in the initial encounter.      Review of Systems   Constitutional: Negative for fever.   Eyes: Negative for visual disturbance.   Respiratory: Negative for shortness of breath.    Cardiovascular: Negative for chest pain.   Gastrointestinal: Negative for nausea and vomiting.   Endocrine: Negative for cold intolerance and heat intolerance.   Genitourinary: Negative for dysuria and hematuria.   Allergic/Immunologic: Negative for environmental allergies and food allergies.   Neurological: Positive for weakness. Negative for tremors.   Hematological: Negative for adenopathy. Does not bruise/bleed easily.   Psychiatric/Behavioral: Positive for sleep disturbance. Negative for agitation, confusion and hallucinations.     Objective:     Vital Signs (Most Recent):  Temp: 98.7 °F (37.1 °C) (12/13/20 0342)  Pulse: 62 (12/13/20 0342)  Resp: 18 (12/13/20 0342)  BP: (!) 158/77 (12/13/20 0342)  SpO2: (!) 90 % (12/13/20 0342)    Vital Signs Range (Last 24H):  Temp:  [97.6 °F (36.4 °C)-99.1 °F (37.3 °C)]   Pulse:  [62-79]   Resp:  [15-18]   BP: (123-169)/(66-95)   SpO2:  [90 %-98 %]     Physical Exam  Vitals signs and nursing note reviewed.   Constitutional:       General: He is not in acute distress.     Appearance: He is well-developed. He is not diaphoretic.   Eyes:      Extraocular Movements:      Right eye: Abnormal extraocular motion (chronic since childhood) present.      Left eye: Abnormal extraocular motion (chronic since childhood) present.      Conjunctiva/sclera: Conjunctivae normal.      Pupils:      Right eye: Pupil is not round.   Neck:      Musculoskeletal: Normal range of motion and neck  supple.   Cardiovascular:      Rate and Rhythm: Normal rate and regular rhythm.   Pulmonary:      Breath sounds: Normal breath sounds.   Abdominal:      Palpations: Abdomen is soft.   Skin:     General: Skin is warm and dry.      Capillary Refill: Capillary refill takes less than 2 seconds.   Neurological:      Mental Status: He is alert and oriented to person, place, and time.      Motor: Weakness present.         Neurological Exam:   LOC: alert  Attention Span: Good   Language: No aphasia  Articulation: No dysarthria  Orientation: Person, Place, Time   Facial Movement (CN VII): left facial droop   Motor: Arm left  Normal 5/5  Leg left  Paresis: 5/5  Arm right  Normal 5/5  Leg right Normal 5/5  Sensation: Intact to light touch, temperature and vibration      Laboratory:  CMP:   Recent Labs   Lab 12/13/20  0500   CALCIUM 8.6*   ALBUMIN 3.5   PROT 6.2   *   K 3.8   CO2 25      BUN 11   CREATININE 1.1   ALKPHOS 74   ALT 31   AST 30   BILITOT 1.5*     CBC:   Recent Labs   Lab 12/13/20  0500   WBC 5.48   RBC 4.17*   HGB 13.7*   HCT 40.1   *   MCV 96   MCH 32.9*   MCHC 34.2     Lipid Panel:   Recent Labs   Lab 12/09/20  0233   CHOL 71*   LDLCALC 31.6*   HDL 27*   TRIG 62     Coagulation:   Recent Labs   Lab 12/08/20  2104   INR 1.1   APTT 24.8     Hgb A1C:   Recent Labs   Lab 12/09/20  0233   HGBA1C 5.2     TSH:   Recent Labs   Lab 12/09/20  0233   TSH 0.184*       Diagnostic Results:      Brain imaging:  CT 12/8/2020   2.5 cm right basal ganglia hemorrhage at the level of the thalamus/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  There is no given history of hypertension and correlation is needed with follow-up recommended to exclude other etiology for hemorrhage.  There is mild 6 mm right to left shift of midline structures but no significant herniation.  No evidence of intraventricular hemorrhage, hydrocephalus or other sick convincing acute finding.  Extensive white matter low density  likely is related to chronic small vessel ischemic changes. Component of white matter infarct is not excluded.  MRI with diffusion imaging could further evaluate.  No evidence of major arterial infarction.  Atrophy and additional incidental nonacute findings as above.      Vessel Imaging:  CTA Head and Neck 12/9/20  Right thalamic parenchymal hemorrhage with no new or worsening regional mass effect compared to 12/08/2020 head CT.  No new hemorrhage is elsewhere.     Unremarkable CTA of the head and neck.    Cardiac Evaluation:   TTE 12/9/20  · The left ventricle is normal in size with normal systolic function. The estimated ejection fraction is 65%.  · Normal left ventricular diastolic function.  · Normal right ventricular size with normal right ventricular systolic function.  · Normal central venous pressure (3 mmHg).  · The estimated PA systolic pressure is 29 mmHg.

## 2020-12-13 NOTE — ASSESSMENT & PLAN NOTE
Hever Reyes Jr. is a 80 y.o. male with a significant medical history of BPH, GERD, HLD, HTN, OA, CAD, and hypothyroidism who presents to the hospital as a transfer from [facility] for evaluation of ICH.  He states his leg gave out causing him to fall and he was unable to get up without assistance.  A CTH revealed a right basal ganglia hemorrhage. CTA H&N showed R basal ganglia changes but was otherwise unremarkable. Etiology HTN. MRI w and wo ordered by NSGY - can not exclude underlying mass lesion. Patient to follow up in NSGY clinic 4-6 weeks for repeat MRI w wo contrast per their recommendations.     Antithrombotics for secondary stroke prevention: Antiplatelets: None: Intracerebral Hemorrhage    Statins for secondary stroke prevention and hyperlipidemia, if present:   Statins: Atorvastatin- 20 mg daily    Aggressive risk factor modification: HTN, HLD, CAD     Rehab efforts: The patient has been evaluated by a stroke team provider and the therapy needs have been fully considered based off the presenting complaints and exam findings. The following therapy evaluations are needed: PT evaluate and treat, OT evaluate and treat, SLP evaluate and treat, PM&R evaluate for appropriate placement - recommending rehab placement     Diagnostics ordered/pending: NA    VTE prophylaxis: Mechanical prophylaxis: heparin sbq   None: Reason for No Pharmacological VTE Prophylaxis: History of systemic or intracranial bleeding    BP parameters: ICH: SBP <160

## 2020-12-13 NOTE — PT/OT/SLP PROGRESS
"Physical Therapy Treatment    Patient Name:  Hever Reyes Jr.   MRN:  [id number]    Recommendations:     Discharge Recommendations:  rehabilitation facility   Discharge Equipment Recommendations: none   Barriers to discharge: Decreased caregiver support    Assessment:     Hever Reyes Jr. is a 80 y.o. male admitted with a medical diagnosis of Nontraumatic subcortical hemorrhage of right cerebral hemisphere.  He presents with the following impairments/functional limitations:  weakness, impaired endurance, gait instability. Patient doing better with mobility as evidenced by his ability to ambulate apprx 48 ft with RW with CGA and without AD for appx 10 ft to chair with CGA. Will benefit from IPR stay to gain [place] and return to prior level.     Rehab Prognosis: Good; patient would benefit from acute skilled PT services to address these deficits and reach maximum level of function.    Recent Surgery: * No surgery found *      Plan:     During this hospitalization, patient to be seen 4 x/week to address the identified rehab impairments via gait training, therapeutic activities, therapeutic exercises and progress toward the following goals:    · Plan of Care Expires:  12/23/20    Subjective     Chief Complaint: head ache  Patient/Family Comments/goals: "My head hurts".   Pain/Comfort:  · Pain Rating 1: 5/10  · Location 1: head  · Pain Addressed 1: Pre-medicate for activity      Objective:     Communicated with nurse prior to session.  Patient found supine with bed alarm, blood pressure cuff, central line, telemetry, pulse ox (continuous), peripheral IV upon PT entry to room.     General Precautions: Standard, aspiration, fall, vision impaired   Orthopedic Precautions:N/A   Braces: N/A     Functional Mobility:  · Bed Mobility:     · Supine to Sit: minimum assistance and vcs for tech  · Transfers:  Sit to Stand:  contact guard assistance with hand-held assist and rolling walker  · Gait: 48 ft with RW and HHA for 10 " ft with CGA.       AM-PAC 6 CLICK MOBILITY  Turning over in bed (including adjusting bedclothes, sheets and blankets)?: 3  Sitting down on and standing up from a chair with arms (e.g., wheelchair, bedside commode, etc.): 3  Moving from lying on back to sitting on the side of the bed?: 3  Moving to and from a bed to a chair (including a wheelchair)?: 3  Need to walk in hospital room?: 3  Climbing 3-5 steps with a railing?: 3  Basic Mobility Total Score: 18       Therapeutic Activities and Exercises:   -white board updated  -educ patient on benefit of OOB activity to improve endurance    Patient left up in chair with all lines intact, call button in reach and son present..    GOALS:   Multidisciplinary Problems     Physical Therapy Goals        Problem: Physical Therapy Goal    Goal Priority Disciplines Outcome Goal Variances Interventions   Physical Therapy Goal     PT, PT/OT Ongoing, Progressing     Description: Goals to be met by: 2020    Patient will increase functional independence with mobility by performin. Supine to sit with supervision  2. Sit to stand transfer with Supervision  3. Gait x250 feet with Supervision    4. Lower extremity exercise program x30 reps, with supervision                     Time Tracking:     PT Received On: 20  PT Start Time: 1139     PT Stop Time: 1203  PT Total Time (min): 24 min     Billable Minutes: Gait Training 24    Treatment Type: Treatment  PT/PTA: PTA     PTA Visit Number: 2     Guillermina Cobb, PTA  2020

## 2020-12-13 NOTE — PROGRESS NOTES
[facility]  Vascular Neurology  Comprehensive Stroke Center  Progress Note    Assessment/Plan:     * 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 is a 80 y.o. male with a significant medical history of BPH, GERD, HLD, HTN, OA, CAD, and hypothyroidism who presents to the hospital as a transfer from [facility] for evaluation of ICH.  He states his leg gave out causing him to fall and he was unable to get up without assistance.  A CTH revealed a right basal ganglia hemorrhage. CTA H&N showed R basal ganglia changes but was otherwise unremarkable. Etiology HTN. MRI w and wo ordered by NSGY - can not exclude underlying mass lesion. Patient to follow up in NSGY clinic 4-6 weeks for repeat MRI w wo contrast per their recommendations.     Antithrombotics for secondary stroke prevention: Antiplatelets: None: Intracerebral Hemorrhage    Statins for secondary stroke prevention and hyperlipidemia, if present:   Statins: Atorvastatin- 20 mg daily    Aggressive risk factor modification: HTN, HLD, CAD     Rehab efforts: The patient has been evaluated by a stroke team provider and the therapy needs have been fully considered based off the presenting complaints and exam findings. The following therapy evaluations are needed: PT evaluate and treat, OT evaluate and treat, SLP evaluate and treat, PM&R evaluate for appropriate placement - recommending rehab placement     Diagnostics ordered/pending: NA    VTE prophylaxis: Mechanical prophylaxis: heparin sbq   None: Reason for No Pharmacological VTE Prophylaxis: History of systemic or intracranial bleeding    BP parameters: ICH: SBP <160        Mixed hyperlipidemia  Stroke risk factor.  The patient is prescribed atorvastatin 80 mg daily.  LDL 31.6.  Atorvastatin 20 mg daily    Vasogenic brain edema  -Moderate area of vasogenic cerebral edema identified when reviewing brain imaging in the territory of the R  middle cerebral artery. There is mass effect associated with it. We will continue to monitor the patients clinical exam for any worsening of symptoms which may indicate expansion of the stroke or the area of the edema resulting in the clinical change. The pattern is suggestive of hypertensive etiology.            CAD (coronary artery disease)  Stroke risk factor  Hold ASA for now 2/2 ICH    Hypothyroidism  Stroke risk factor  Continue home levothyroxine    Essential hypertension  Stroke risk factor  SBP <160   Metoprolol 100 mg ER  Norvasc 5 mg started 12/11, increased to 10 mg          12/10: NAEON; patient neuro exam stable; plan to step down to vascular neurology today  12/11: Patient stepped down overnight. BP elevated - switched metoprolol to home dose and started Norvasc 5 mg. Will titrate up as needed. Waiting on rehab placement.   12/12: NAEON. Increasing Norvasc to 10 mg. Pending rehab placement.   12/13: Patient reports unable to sleep at night - melatonin nightly ordered.     STROKE DOCUMENTATION        NIH Scale:  1a. Level of Consciousness: 0-->Alert, keenly responsive  1b. LOC Questions: 1-->Answers one question correctly  1c. LOC Commands: 0-->Performs both tasks correctly  2. Best Gaze: 0-->Normal(baseline)  3. Visual: 0-->No visual loss  4. Facial Palsy: 1-->Minor paralysis (flattened nasolabial fold, asymmetry on smiling)  5a. Motor Arm, Left: 0-->No drift, limb holds 90 (or 45) degrees for full 10 secs  5b. Motor Arm, Right: 0-->No drift, limb holds 90 (or 45) degrees for full 10 secs  6a. Motor Leg, Left: 0-->No drift, leg holds 30 degree position for full 5 secs  6b. Motor Leg, Right: 0-->No drift, leg holds 30 degree position for full 5 secs  7. Limb Ataxia: 0-->Absent  8. Sensory: 0-->Normal, no sensory loss  9. Best Language: 0-->No aphasia, normal  10. Dysarthria: 0-->Normal  11. Extinction and Inattention (formerly Neglect): 0-->No abnormality  Total (NIH Stroke Scale): 2       Modified  [place] Score: 1  [place] Coma Scale:    ABCD2 Score:    [id number] Score:   HAS -BLED Score:   ICH Score:0  Hunt & Banegas Classification:      Hemorrhagic change of an Ischemic Stroke: Does this patient have an ischemic stroke with hemorrhagic changes? No     Neurologic Chief Complaint: R Basal Ganglia Hemorrhage    Subjective:     Interval History: Patient is seen for follow-up neurological assessment and treatment recommendations:      Patient reports unable to sleep at night - melatonin nightly ordered.    HPI, Past Medical, Family, and Social History remains the same as documented in the initial encounter.      Review of Systems   Constitutional: Negative for fever.   Eyes: Negative for visual disturbance.   Respiratory: Negative for shortness of breath.    Cardiovascular: Negative for chest pain.   Gastrointestinal: Negative for nausea and vomiting.   Endocrine: Negative for cold intolerance and heat intolerance.   Genitourinary: Negative for dysuria and hematuria.   Allergic/Immunologic: Negative for environmental allergies and food allergies.   Neurological: Positive for weakness. Negative for tremors.   Hematological: Negative for adenopathy. Does not bruise/bleed easily.   Psychiatric/Behavioral: Positive for sleep disturbance. Negative for agitation, confusion and hallucinations.     Objective:     Vital Signs (Most Recent):  Temp: 98.7 °F (37.1 °C) (12/13/20 0342)  Pulse: 62 (12/13/20 0342)  Resp: 18 (12/13/20 0342)  BP: (!) 158/77 (12/13/20 0342)  SpO2: (!) 90 % (12/13/20 0342)    Vital Signs Range (Last 24H):  Temp:  [97.6 °F (36.4 °C)-99.1 °F (37.3 °C)]   Pulse:  [62-79]   Resp:  [15-18]   BP: (123-169)/(66-95)   SpO2:  [90 %-98 %]     Physical Exam  Vitals signs and nursing note reviewed.   Constitutional:       General: He is not in acute distress.     Appearance: He is well-developed. He is not diaphoretic.   Eyes:      Extraocular Movements:      Right eye: Abnormal extraocular motion (chronic  since childhood) present.      Left eye: Abnormal extraocular motion (chronic since childhood) present.      Conjunctiva/sclera: Conjunctivae normal.      Pupils:      Right eye: Pupil is not round.   Neck:      Musculoskeletal: Normal range of motion and neck supple.   Cardiovascular:      Rate and Rhythm: Normal rate and regular rhythm.   Pulmonary:      Breath sounds: Normal breath sounds.   Abdominal:      Palpations: Abdomen is soft.   Skin:     General: Skin is warm and dry.      Capillary Refill: Capillary refill takes less than 2 seconds.   Neurological:      Mental Status: He is alert and oriented to person, place, and time.      Motor: Weakness present.         Neurological Exam:   LOC: alert  Attention Span: Good   Language: No aphasia  Articulation: No dysarthria  Orientation: Person, Place, Time   Facial Movement (CN VII): left facial droop   Motor: Arm left  Normal 5/5  Leg left  Paresis: 5/5  Arm right  Normal 5/5  Leg right Normal 5/5  Sensation: Intact to light touch, temperature and vibration      Laboratory:  CMP:   Recent Labs   Lab 12/13/20  0500   CALCIUM 8.6*   ALBUMIN 3.5   PROT 6.2   *   K 3.8   CO2 25      BUN 11   CREATININE 1.1   ALKPHOS 74   ALT 31   AST 30   BILITOT 1.5*     CBC:   Recent Labs   Lab 12/13/20  0500   WBC 5.48   RBC 4.17*   HGB 13.7*   HCT 40.1   *   MCV 96   MCH 32.9*   MCHC 34.2     Lipid Panel:   Recent Labs   Lab 12/09/20  0233   CHOL 71*   LDLCALC 31.6*   HDL 27*   TRIG 62     Coagulation:   Recent Labs   Lab 12/08/20  2104   INR 1.1   APTT 24.8     Hgb A1C:   Recent Labs   Lab 12/09/20  0233   HGBA1C 5.2     TSH:   Recent Labs   Lab 12/09/20  0233   TSH 0.184*       Diagnostic Results:      Brain imaging:  CT 12/8/2020   2.5 cm right basal ganglia hemorrhage at the level of the thalamus/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  There is no given history of hypertension and correlation is needed with follow-up recommended to exclude  other etiology for hemorrhage.  There is mild 6 mm right to left shift of midline structures but no significant herniation.  No evidence of intraventricular hemorrhage, hydrocephalus or other sick convincing acute finding.  Extensive white matter low density likely is related to chronic small vessel ischemic changes. Component of white matter infarct is not excluded.  MRI with diffusion imaging could further evaluate.  No evidence of major arterial infarction.  Atrophy and additional incidental nonacute findings as above.      Vessel Imaging:  CTA Head and Neck 12/9/20  Right thalamic parenchymal hemorrhage with no new or worsening regional mass effect compared to 12/08/2020 head CT.  No new hemorrhage is elsewhere.     Unremarkable CTA of the head and neck.    Cardiac Evaluation:   TTE 12/9/20  · The left ventricle is normal in size with normal systolic function. The estimated ejection fraction is 65%.  · Normal left ventricular diastolic function.  · Normal right ventricular size with normal right ventricular systolic function.  · Normal central venous pressure (3 mmHg).  · The estimated PA systolic pressure is 29 mmHg.      Maia Gerardo NP  Comprehensive Stroke Center  Department of Vascular Neurology   [facility]

## 2020-12-14 VITALS
TEMPERATURE: 98 F | SYSTOLIC BLOOD PRESSURE: 147 MMHG | HEART RATE: 69 BPM | DIASTOLIC BLOOD PRESSURE: 65 MMHG | WEIGHT: 200.81 LBS | HEIGHT: 65 IN | RESPIRATION RATE: 16 BRPM | OXYGEN SATURATION: 100 % | BODY MASS INDEX: 33.46 KG/M2

## 2020-12-14 PROCEDURE — 99233 SBSQ HOSP IP/OBS HIGH 50: CPT | Mod: GC,,, | Performed by: PSYCHIATRY & NEUROLOGY

## 2020-12-14 PROCEDURE — 25000003 PHARM REV CODE 250: Performed by: INTERNAL MEDICINE

## 2020-12-14 PROCEDURE — 63600175 PHARM REV CODE 636 W HCPCS: Performed by: NURSE PRACTITIONER

## 2020-12-14 PROCEDURE — 92507 TX SP LANG VOICE COMM INDIV: CPT

## 2020-12-14 PROCEDURE — 97530 THERAPEUTIC ACTIVITIES: CPT

## 2020-12-14 PROCEDURE — 92526 ORAL FUNCTION THERAPY: CPT

## 2020-12-14 PROCEDURE — 99233 PR SUBSEQUENT HOSPITAL CARE,LEVL III: ICD-10-PCS | Mod: GC,,, | Performed by: PSYCHIATRY & NEUROLOGY

## 2020-12-14 PROCEDURE — 97535 SELF CARE MNGMENT TRAINING: CPT

## 2020-12-14 PROCEDURE — 25000003 PHARM REV CODE 250: Performed by: NURSE PRACTITIONER

## 2020-12-14 RX ORDER — AMLODIPINE BESYLATE 10 MG/1
10 TABLET ORAL DAILY
Qty: 30 TABLET | Refills: 2
Start: 2020-12-15 | End: 2021-10-19

## 2020-12-14 RX ORDER — ATORVASTATIN CALCIUM 20 MG/1
20 TABLET, FILM COATED ORAL DAILY
Qty: 30 TABLET | Refills: 2
Start: 2020-12-15 | End: 2021-10-19

## 2020-12-14 RX ORDER — POLYETHYLENE GLYCOL 3350 17 G/17G
17 POWDER, FOR SOLUTION ORAL DAILY PRN
Status: DISCONTINUED | OUTPATIENT
Start: 2020-12-14 | End: 2020-12-14 | Stop reason: HOSPADM

## 2020-12-14 RX ORDER — AMOXICILLIN 250 MG
1 CAPSULE ORAL 2 TIMES DAILY PRN
Status: DISCONTINUED | OUTPATIENT
Start: 2020-12-14 | End: 2020-12-14 | Stop reason: HOSPADM

## 2020-12-14 RX ADMIN — ATORVASTATIN CALCIUM 20 MG: 20 TABLET, FILM COATED ORAL at 08:12

## 2020-12-14 RX ADMIN — HEPARIN SODIUM 5000 UNITS: 5000 INJECTION INTRAVENOUS; SUBCUTANEOUS at 05:12

## 2020-12-14 RX ADMIN — ACETAMINOPHEN 650 MG: 325 TABLET ORAL at 10:12

## 2020-12-14 RX ADMIN — LEVOTHYROXINE SODIUM 200 MCG: 100 TABLET ORAL at 05:12

## 2020-12-14 RX ADMIN — AMLODIPINE BESYLATE 10 MG: 10 TABLET ORAL at 08:12

## 2020-12-14 RX ADMIN — HEPARIN SODIUM 5000 UNITS: 5000 INJECTION INTRAVENOUS; SUBCUTANEOUS at 02:12

## 2020-12-14 RX ADMIN — METOPROLOL SUCCINATE 100 MG: 100 TABLET, EXTENDED RELEASE ORAL at 08:12

## 2020-12-14 RX ADMIN — TAMSULOSIN HYDROCHLORIDE 0.4 MG: 0.4 CAPSULE ORAL at 08:12

## 2020-12-14 NOTE — DISCHARGE SUMMARY
"[facility]  Vascular Neurology  Comprehensive Stroke Center  Discharge Summary     Summary:     Admit Date: 12/9/2020  1:10 AM    Discharge Date and Time:  12/14/2020 3:18 PM    Attending Physician: Denilson Rubin MD     Discharge Provider: Job Collins MD    History of Present Illness: Hever Reyes Jr. is a 80 y.o. male with a significant medical history of BPH, GERD, HLD, HTN, OA, CAD, and hypothyroidism who presents to the hospital as a transfer from [facility] for evaluation of ICH.  The patient was in his usual state of health and states he stood up and attempted to walk and his legs gave out.  He fell subsequently striking the left side of his body including his chest and head.  The patient states he was then unable to get up from the floor without assistance.  He denies LOC or any preceding symptoms.  He presented to the [facility] ED where a CTH was obtained and revealed a right ICH.  The patient was transferred to [facility] for further evaluation by Neurosurgery, higher level of care.  Currently the patient is AA&O x 4.  He is complaining of a right frontal headache and RUE heaviness"  but currently denies dizziness, chest pain, N/V, abdominal pain, or change in vision.  On exam the patient is noted to have a dysconjugate gaze and surgical pupil which are baseline for the patient.  He was seen by Neurosurgery while in ED.  No neurosurgical intervention at this time.  The patient is admitted to [facility].      Hospital Course (synopsis of major diagnoses, care, treatment, and services provided during the course of the hospital stay): 12/10: NAEON; patient neuro exam stable; plan to step down to vascular neurology today  12/11: Patient stepped down overnight. BP elevated - switched metoprolol to home dose and started Norvasc 5 mg. Will titrate up as needed. Waiting on rehab placement.   12/12: NAEON. Increasing Norvasc to 10 mg. Pending rehab placement. "   12/13: Patient reports unable to sleep at night - melatonin nightly ordered.   12/14: No acute events overnight, some complaints of headache this morning resolved with PRN Tylenol. To be discharged to inpatient rehab facility today.    Stroke Etiology: Not Applicable/Not Ischemic Stroke, right basal ganglia hemorrhage presumed secondary to HTN    STROKE DOCUMENTATION       NIH Scale:  1a. Level of Consciousness: 0-->Alert, keenly responsive  1b. LOC Questions: 1-->Answers one question correctly  1c. LOC Commands: 0-->Performs both tasks correctly  2. Best Gaze: 0-->Normal(Baseline amblyopia on left, left MFL involvement?)  3. Visual: 0-->No visual loss  4. Facial Palsy: 1-->Minor paralysis (flattened nasolabial fold, asymmetry on smiling)  5a. Motor Arm, Left: 0-->No drift, limb holds 90 (or 45) degrees for full 10 secs  5b. Motor Arm, Right: 0-->No drift, limb holds 90 (or 45) degrees for full 10 secs  6a. Motor Leg, Left: 0-->No drift, leg holds 30 degree position for full 5 secs  6b. Motor Leg, Right: 0-->No drift, leg holds 30 degree position for full 5 secs  7. Limb Ataxia: 0-->Absent  8. Sensory: 0-->Normal, no sensory loss  9. Best Language: 0-->No aphasia, normal  10. Dysarthria: 0-->Normal  11. Extinction and Inattention (formerly Neglect): 0-->No abnormality  Total (NIH Stroke Scale): 2    Assessment/Plan:     Diagnostic Results:    Brain imaging:  CTH 12/8/2020   2.5 cm right basal ganglia hemorrhage at the level of the thalamus/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  There is no given history of hypertension and correlation is needed with follow-up recommended to exclude other etiology for hemorrhage.  There is mild 6 mm right to left shift of midline structures but no significant herniation.  No evidence of intraventricular hemorrhage, hydrocephalus or other sick convincing acute finding.  Extensive white matter low density likely is related to chronic small vessel ischemic changes.  Component of white matter infarct is not excluded.  MRI with diffusion imaging could further evaluate.  No evidence of major arterial infarction.  Atrophy and additional incidental nonacute findings as above.        Vessel Imaging:  CTA Head and Neck 12/9/20  Right thalamic parenchymal hemorrhage with no new or worsening regional mass effect compared to 12/08/2020 head CT.  No new hemorrhage is elsewhere.     Unremarkable CTA of the head and neck.     Cardiac Evaluation:   TTE 12/9/20  · The left ventricle is normal in size with normal systolic function. The estimated ejection fraction is 65%.  · Normal left ventricular diastolic function.  · Normal right ventricular size with normal right ventricular systolic function.  · Normal central venous pressure (3 mmHg).  · The estimated PA systolic pressure is 29 mmHg.       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 [I61.0] 12/09/2020 Yes    Impaired mobility and ADLs [Z74.09, Z78.9] 12/11/2020 No    Fall due to stumbling [W01.0XXA] 12/09/2020 Yes    Iron deficiency anemia [D50.9] 12/09/2020 Yes    Essential hypertension [I10] 12/09/2020 Yes    Hypothyroidism [E03.9] 12/09/2020 Yes    History of myocardial infarction [I25.2] 12/09/2020 Not Applicable    CAD (coronary artery disease) [I25.10] 12/09/2020 Yes    BPH (benign prostatic hyperplasia) [N40.0] 12/09/2020 Yes    Vasogenic brain edema [G93.6] 12/09/2020 Yes    Mixed hyperlipidemia [E78.2] 12/09/2020 Yes      Problems Resolved During this Admission:     * 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 is a 80 y.o. male with a significant medical history of BPH, GERD, HLD, HTN, OA, CAD, and hypothyroidism who presents to the hospital as a  transfer from [facility] for evaluation of ICH.  He states his leg gave out causing him to fall and he was unable to get up without assistance.  A CTH revealed a right basal ganglia hemorrhage. CTA H&N showed R basal ganglia changes but was otherwise unremarkable. Etiology HTN. MRI w and wo ordered by NSGY - can not exclude underlying mass lesion. Patient to follow up in NSGY clinic 4-6 weeks for repeat MRI w wo contrast per their recommendations.     Antithrombotics for secondary stroke prevention: Antiplatelets: None: Intracerebral Hemorrhage    Statins for secondary stroke prevention and hyperlipidemia, if present:   Statins: Atorvastatin- 20 mg daily    Aggressive risk factor modification: HTN, HLD, CAD     Rehab efforts: The patient has been evaluated by a stroke team provider and the therapy needs have been fully considered based off the presenting complaints and exam findings. The following therapy evaluations are needed: PT evaluate and treat, OT evaluate and treat, SLP evaluate and treat, PM&R evaluate for appropriate placement - inpatient rehab facility on discharge.    Diagnostics ordered/pending: NA    VTE prophylaxis: Mechanical prophylaxis: Per inpatient rehab.  Pharmacological VTE Prophylaxis: Per inpatient rehab.    BP parameters: ICH: SBP < 160 mmHg    Mixed hyperlipidemia  Stroke risk factor  The patient is prescribed atorvastatin 80 mg daily as outpatient  LDL 31.6  Resume atorvastatin 20 mg daily for now    Vasogenic brain edema  Moderate area of vasogenic cerebral edema identified when reviewing brain imaging in the territory of the R middle cerebral artery. There is mass effect associated with it. We will continue to monitor the patients clinical exam for any worsening of symptoms which may indicate expansion of the stroke or the area of the edema resulting in the clinical change. The pattern is suggestive of hypertensive etiology.    CAD (coronary artery disease)  Stroke  risk factor  Hold ASA for now 2/2 ICH    Hypothyroidism  Stroke risk factor  Continue home dose levothyroxine 200 mcg daily  TSH 0.184 with free T4 of 1.13 on admission    Essential hypertension  Stroke risk factor  SBP < 160 mmHg  Resume Toprol- mg daily and Norvasc 10 mg daily    Recommendations:     Post-discharge complication risks: Falls, Pneumonia, Skin breakdown, Urinary tract infections    Stroke Education given to: patient and family    Follow-up in Stroke Clinic in 4-6 weeks.     Discharge Plan:  Antithrombotics: None. Contraindicated due to Hemorrhagic Stroke  Statin: Atorvastatin 20mg  Aggresive risk factor modification:  Hypertension  High Cholesterol  Diet  Exercise  Obesity  Carotid Artery disease    Follow Up:  Follow-up Information     Hever Carvalho MD.    Specialties: Internal Medicine, Pediatrics  Why: Please call to schedule your hospital follow-up appointment for within 1 week of discharge from inpatient rehab facility.  Contact information:  [address] [place] [address] [phone]                   Patient Instructions:      Ambulatory referral/consult to Vascular Neurology   Standing Status: Future   Referral Priority: Routine Referral Type: Consultation   Referral Reason: Specialty Services Required   Requested Specialty: Vascular Neurology   Number of Visits Requested: 1     Ambulatory referral/consult to Neurosurgery   Standing Status: Future   Referral Priority: Routine Referral Type: Consultation   Referral Reason: Specialty Services Required   Requested Specialty: Neurosurgery   Number of Visits Requested: 1       Medications:  Reconciled Home Medications:      Medication List      START taking these medications    amLODIPine 10 MG tablet  Commonly known as: NORVASC  Take 1 tablet (10 mg total) by mouth once daily.  Start taking on: December 15, 2020        CHANGE how you take these medications    atorvastatin 20 MG tablet  Commonly known as: LIPITOR  Take 1 tablet  (20 mg total) by mouth once daily.  Start taking on: December 15, 2020  What changed:   · medication strength  · how much to take        CONTINUE taking these medications    ferrous sulfate 325 mg (65 mg iron) Tab tablet  Commonly known as: FEOSOL  Take 1 tablet by mouth twice daily     fish oil-omega-3 fatty acids 300-1,000 mg capsule  Take by mouth once daily.     levothyroxine 200 MCG tablet  Commonly known as: SYNTHROID  Take 1 tablet by mouth once daily     metoprolol succinate 100 MG 24 hr tablet  Commonly known as: TOPROL-XL  Take 1 tablet by mouth once daily     multivitamin capsule  Take 1 capsule by mouth once daily.     tamsulosin 0.4 mg Cap  Commonly known as: FLOMAX  Take 1 capsule by mouth once daily        STOP taking these medications    aspirin 81 MG EC tablet  Commonly known as: ECOTRIN            Job Collins MD  Comprehensive Stroke Center  Department of Vascular Neurology   [facility]

## 2020-12-14 NOTE — PT/OT/SLP PROGRESS
"Speech Language Pathology Treatment    Patient Name:  Hever Reyes Jr.   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    Recommendations:                 General Recommendations:  Dysphagia therapy and Cognitive-linguistic therapy  Diet recommendations:  Regular, Liquid Diet Level: Thin   Aspiration Precautions: 1 bite/sip at a time, Avoid talking while eating, no straws, Eliminate distractions, Feed only when awake/alert, Frequent oral care, HOB to 90 degrees, No straws, Remain upright 30 minutes post meal, Small bites/sips and Strict aspiration precautions   General Precautions: Standard, aspiration, fall, seizure, vision impaired  Communication strategies:  provide increased time to answer and go to room if call light pushed    Subjective     SLP reviewed Pt with RN  Pt presents calm  He explains, "I've had cataract stuff for years" when discussing vision     Pain/Comfort:  · Pain Rating 1: 5/10  · Location - Side 1: Left  · Location - Orientation 1: generalized  · Location 1: head  · Pain Addressed 1: Cessation of Activity, MD notified  · Pain Rating Post-Intervention 1: 5/10    Objective:     Has the patient been evaluated by SLP for swallowing?   Yes  Keep patient NPO? No   Current Respiratory Status: room air      Pt found awake in bed with daughter at bedside. HOB elevated.  He was alert and oriented x4.His voice was clear with adequate intensity.  Pt seen with self-fed cup edge sips thin liquids x3. No overt S/S aspiration with sips thin liquids. He declined addittional PO trials 2/2 recently completed breakfast meal tray. He completed fx reading tasks with 90% Accuracy I'ly.  He endorsed difficult reading items on whiteboard in room 2/2 eyeglasses not present and harder to see at distance. He named on average 8 items/minute in a concrete category across 3 attempts provided min-mod verbal cues from SLP to expand. He completed fx math questions for " time management, verbally presented with 66% accuracy, I'ly.  He His voice was clear with adequate intensity.   Pt was educated on progress, SLP Role, ST goals, safe swallow precautions, S/S aspiration for ongoing monitoring and recommendations for ongoing ST via IRF upon d/c from acute. He nodded along to education and asked SLP questions about why he felt his ear was itching. MD in room to round with Pt and questions deferred to MD. No additional questions. Wheitboard current. Pt remained in position as found with call light in reach and MD in room upon SLP Exit.     Assessment:     Hever Reyes Jr. is a 80 y.o. male with an SLP diagnosis of Dysphagia, Cognitive-Linguistic Impairment and Visio-Spatial Impairment.     Goals:   Multidisciplinary Problems     SLP Goals        Problem: SLP Goal    Goal Priority Disciplines Outcome   SLP Goal     SLP Ongoing, Progressing   Description: Speech-Language Pathology Goals   Goals to be met by 12/16/20  1. Patient will participate in ongoing swallow assessment in order to determine safest least restrictive diet.   2. Patient will participate in a speech/language/cognitive assessment (met & ongoing).  3. Patient will answer problem solving/safety awareness questions with 80% accuracy and mod assist.   4. Patient will answer mental manipulation questions with 75% accuracy and mod assist.   5. Patient will participate in ongoing assessment of reading/writing/visuospatial skills.                    Plan:     · Patient to be seen:  4 x/week   · Plan of Care expires:  01/08/21  · Plan of Care reviewed with:  patient   · SLP Follow-Up:  Yes       Discharge recommendations:  rehabilitation facility     Time Tracking:     SLP Treatment Date:   12/14/20  Speech Start Time:  1008  Speech Stop Time:  1029     Speech Total Time (min):  21 min    Billable Minutes: Speech Therapy Individual 13 and Treatment Swallowing Dysfunction 8    FARHAD Giraldo., CCC-SLP  Speech-Language  Pathology  Pager: [phone]    12/14/2020

## 2020-12-14 NOTE — PLAN OF CARE
Problem: SLP Goal  Goal: SLP Goal  Description: Speech-Language Pathology Goals   Goals to be met by 12/16/20  1. Patient will participate in ongoing swallow assessment in order to determine safest least restrictive diet.   2. Patient will participate in a speech/language/cognitive assessment (met & ongoing).  3. Patient will answer problem solving/safety awareness questions with 80% accuracy and mod assist.   4. Patient will answer mental manipulation questions with 75% accuracy and mod assist.   5. Patient will participate in ongoing assessment of reading/writing/visuospatial skills.   Outcome: Ongoing, Progressing     Pt progressing with goals. ST to continue to follow.    FARHAD Giraldo., [facility]SLP  Speech-Language Pathology  Pager: [phone]  12/14/2020

## 2020-12-14 NOTE — PLAN OF CARE
Goals remain appropriate. ROULA Choe 12/14/2020   Problem: Occupational Therapy Goal  Goal: Occupational Therapy Goal  Description: Goals to be met by: 12/23     Patient will increase functional independence with ADLs by performing:    UE Dressing with Contact Guard Assistance.  LE Dressing (pants, brief) with Contact Guard Assistance.  Grooming while standing at sink with Contact Guard Assistance. met  Toileting from toilet with Minimal Assistance for hygiene and clothing management. progressing  Supine to sit with Contact Guard Assistance.  Toilet transfer to toilet with Contact Guard Assistance. Met with RW  Functional mobility at household distance for ADL task with min(A). Met with RW  Pt will verbalize s/s of stroke with teachback.   Outcome: Ongoing, Progressing

## 2020-12-14 NOTE — SUBJECTIVE & OBJECTIVE
"Neurologic Chief Complaint: R Basal Ganglia Hemorrhage    Subjective:     Interval History: Patient seen and examined this AM. No acute events overnight. Still reports some left upper extremity "numbness" although dull sensory sensation remains intact without difference. Also with some complaints of headache this morning for which he received PRN Tylenol. Otherwise he is afebrile with pulse ranging from 70-60s bpm. Systolic blood readings ranging from 140-130s mmHg. UOP remains adequate with ~400 mL urine output in the last 24 hrs but with four unmeasured voids. Pending inpatient rehab placement at this time. To continue working with PT and OT in the meantime.     HPI, Past Medical, Family, and Social History remains the same as documented in the initial encounter.      Review of Systems   Constitutional: Negative for chills, diaphoresis and fever.   HENT: Negative for trouble swallowing and voice change.    Eyes: Negative for photophobia and visual disturbance.   Respiratory: Negative for cough, shortness of breath and wheezing.    Cardiovascular: Negative for chest pain, palpitations and leg swelling.   Gastrointestinal: Negative for abdominal distention, abdominal pain, blood in stool, constipation, diarrhea, nausea and vomiting.   Genitourinary: Negative for difficulty urinating, dysuria and hematuria.   Musculoskeletal: Negative for joint swelling and neck pain.   Allergic/Immunologic: Negative for environmental allergies and food allergies.   Neurological: Positive for facial asymmetry, weakness, numbness and headaches. Negative for dizziness, tremors, seizures, syncope, speech difficulty and light-headedness.   Psychiatric/Behavioral: Negative for confusion and sleep disturbance.     Objective:     Vital Signs (Most Recent):  Temp: 97.8 °F (36.6 °C) (12/14/20 0729)  Pulse: 62 (12/14/20 0729)  Resp: 16 (12/14/20 0729)  BP: (!) 147/65 (12/14/20 0729)  SpO2: 100 % (12/14/20 0729)    Vital Signs Range (Last " 24H):  Temp:  [97.8 °F (36.6 °C)-99 °F (37.2 °C)]   Pulse:  [62-76]   Resp:  [16-20]   BP: (133-156)/(65-85)   SpO2:  [95 %-100 %]     Physical Exam  Vitals signs and nursing note reviewed.   Constitutional:       General: He is not in acute distress.     Appearance: He is well-developed. He is not diaphoretic.   Eyes:      Extraocular Movements:      Right eye: Abnormal extraocular motion (chronic since childhood) present.      Left eye: Abnormal extraocular motion (chronic since childhood) present.      Conjunctiva/sclera: Conjunctivae normal.      Pupils:      Right eye: Pupil is not round.   Neck:      Musculoskeletal: Normal range of motion and neck supple.   Cardiovascular:      Rate and Rhythm: Normal rate and regular rhythm.   Pulmonary:      Breath sounds: Normal breath sounds.   Abdominal:      Palpations: Abdomen is soft.   Skin:     General: Skin is warm and dry.      Capillary Refill: Capillary refill takes less than 2 seconds.   Neurological:      Mental Status: He is alert and oriented to person, place, and time.      Motor: Weakness present.       Neurological Exam:   LOC: alert  Attention Span: Good   Language: No aphasia  Articulation: No dysarthria  Orientation: Person, Place, Time   Facial Movement (CN VII): left facial droop   Motor: Arm left  Normal 5/5  Leg left  Paresis: 5/5  Arm right  Normal 5/5  Leg right Normal 5/5  Sensation: Intact to light touch, temperature and vibration    Laboratory:  CMP:   No results for input(s): GLUCOSE, CALCIUM, ALBUMIN, PROT, NA, K, CO2, CL, BUN, CREATININE, ALKPHOS, ALT, AST, BILITOT in the last 24 hours.  CBC:   Recent Labs   Lab 12/13/20  0500   WBC 5.48   RBC 4.17*   HGB 13.7*   HCT 40.1   *   MCV 96   MCH 32.9*   MCHC 34.2     Lipid Panel:   Recent Labs   Lab 12/09/20  0233   CHOL 71*   LDLCALC 31.6*   HDL 27*   TRIG 62     Coagulation:   Recent Labs   Lab 12/08/20  2104   INR 1.1   APTT 24.8     Hgb A1C:   Recent Labs   Lab 12/09/20  0233   HGBA1C  5.2     TSH:   Recent Labs   Lab 12/09/20  0233   TSH 0.184*       Diagnostic Results:    Brain imaging:  CTH 12/8/2020   2.5 cm right basal ganglia hemorrhage at the level of the thalamus/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  There is no given history of hypertension and correlation is needed with follow-up recommended to exclude other etiology for hemorrhage.  There is mild 6 mm right to left shift of midline structures but no significant herniation.  No evidence of intraventricular hemorrhage, hydrocephalus or other sick convincing acute finding.  Extensive white matter low density likely is related to chronic small vessel ischemic changes. Component of white matter infarct is not excluded.  MRI with diffusion imaging could further evaluate.  No evidence of major arterial infarction.  Atrophy and additional incidental nonacute findings as above.      Vessel Imaging:  CTA Head and Neck 12/9/20  Right thalamic parenchymal hemorrhage with no new or worsening regional mass effect compared to 12/08/2020 head CT.  No new hemorrhage is elsewhere.     Unremarkable CTA of the head and neck.    Cardiac Evaluation:   TTE 12/9/20  · The left ventricle is normal in size with normal systolic function. The estimated ejection fraction is 65%.  · Normal left ventricular diastolic function.  · Normal right ventricular size with normal right ventricular systolic function.  · Normal central venous pressure (3 mmHg).  · The estimated PA systolic pressure is 29 mmHg.

## 2020-12-14 NOTE — ASSESSMENT & PLAN NOTE
Stroke risk factor  The patient is prescribed atorvastatin 80 mg daily as outpatient  LDL 31.6  Resume atorvastatin 20 mg daily for now

## 2020-12-14 NOTE — PLAN OF CARE
12/14/20 1533   Post-Acute Status   Post-Acute Authorization Placement   Post-Acute Placement Status Set-up Complete       Pt has been accepted by [facility]. Nurse can call report to [phone].  Transport setup with a W/C for 5pm.  SW in contact with CM and Medical staff. Will continue to follow and offer support as needed.     Brandt Clarke, CAROLYN  [facility]   Ext. [phone]

## 2020-12-14 NOTE — ASSESSMENT & PLAN NOTE
Hever Reyes Jr. is a 80 y.o. male with a significant medical history of BPH, GERD, HLD, HTN, OA, CAD, and hypothyroidism who presents to the hospital as a transfer from [facility] for evaluation of ICH.  He states his leg gave out causing him to fall and he was unable to get up without assistance.  A CTH revealed a right basal ganglia hemorrhage. CTA H&N showed R basal ganglia changes but was otherwise unremarkable. Etiology HTN. MRI w and wo ordered by NSGY - can not exclude underlying mass lesion. Patient to follow up in NSGY clinic 4-6 weeks for repeat MRI w wo contrast per their recommendations.     Antithrombotics for secondary stroke prevention: Antiplatelets: None: Intracerebral Hemorrhage    Statins for secondary stroke prevention and hyperlipidemia, if present:   Statins: Atorvastatin- 20 mg daily    Aggressive risk factor modification: HTN, HLD, CAD     Rehab efforts: The patient has been evaluated by a stroke team provider and the therapy needs have been fully considered based off the presenting complaints and exam findings. The following therapy evaluations are needed: PT evaluate and treat, OT evaluate and treat, SLP evaluate and treat, PM&R evaluate for appropriate placement - recommending inpatient rehab, placement pending    Diagnostics ordered/pending: NA    VTE prophylaxis: Mechanical prophylaxis: FREDIS hose   Pharmacological VTE Prophylaxis: heparin 5,000 U subcutaneous Q8H    BP parameters: ICH: SBP <160

## 2020-12-14 NOTE — ASSESSMENT & PLAN NOTE
Moderate area of vasogenic cerebral edema identified when reviewing brain imaging in the territory of the R middle cerebral artery. There is mass effect associated with it. We will continue to monitor the patients clinical exam for any worsening of symptoms which may indicate expansion of the stroke or the area of the edema resulting in the clinical change. The pattern is suggestive of hypertensive etiology.

## 2020-12-14 NOTE — PLAN OF CARE
Patient aqaox3, amb to bathroom, vss, given pushpa med and PRN med for H/A, no c/o at this time will cont to monitor.  Problem: Cerebral Tissue Perfusion Risk (Stroke, Hemorrhagic)  Goal: Optimal Cerebral Tissue Perfusion  Outcome: Ongoing, Progressing     Problem: Sensorimotor Impairment (Stroke, Hemorrhagic)  Goal: Improved Sensorimotor Function  Outcome: Ongoing, Progressing

## 2020-12-14 NOTE — ASSESSMENT & PLAN NOTE
Stroke risk factor  SBP < 160 mmHg  Resume Toprol- mg daily and Norvasc 10 mg daily for now  Continue with labetalol 10 mg IV Q4H PRN for systolic BP readings > 160 mmHg

## 2020-12-14 NOTE — NURSING
Called rehab , given report to zoila robles, family at bedside, no c/o at this time.dc Iv dc monitor, waiting for ride at 5

## 2020-12-14 NOTE — PLAN OF CARE
[facility]    FACILITY TRANSFER ORDERS      Patient Name: Hever Reyes Jr.  YOB: 1940    PCP: Hever Carvalho MD   PCP Address: [address] / [place] [address]  PCP Phone Number: [phone]  PCP Fax: [phone]    Encounter Date: 12/14/2020    Admit to: Inpatient rehab facility    Vital Signs:  Routine    Diagnoses:   Active Hospital Problems    Diagnosis  POA    *Nontraumatic subcortical hemorrhage of right cerebral hemisphere [I61.0]  Yes    Impaired mobility and ADLs [Z74.09, Z78.9]  No    Fall due to stumbling [W01.0XXA]  Yes    Iron deficiency anemia [D50.9]  Yes    Essential hypertension [I10]  Yes    Hypothyroidism [E03.9]  Yes    History of myocardial infarction [I25.2]  Not Applicable    CAD (coronary artery disease) [I25.10]  Yes    BPH (benign prostatic hyperplasia) [N40.0]  Yes    Vasogenic brain edema [G93.6]  Yes    Mixed hyperlipidemia [E78.2]  Yes      Resolved Hospital Problems   No resolved problems to display.       Allergies:Review of patient's allergies indicates:  No Known Allergies    Diet: regular diet and 2 gram sodium diet    Activities: Activity as tolerated    Nursing: Per facility protocol    Labs: Per facility protocol     CONSULTS:    Physical Therapy to evaluate and treat. , Occupational Therapy to evaluate and treat. and Speech Therapy to evaluate and treat for Language and Cognition.    MISCELLANEOUS CARE:  Routine Skin for Bedridden Patients: Apply moisture barrier cream to all skin folds and wet areas in perineal area daily and after baths and all bowel movements.    WOUND CARE ORDERS  None    Medications: Review discharge medications with patient and family and provide education.      Current Discharge Medication List      START taking these medications    Details   amLODIPine (NORVASC) 10 MG tablet Take 1 tablet (10 mg total) by mouth once daily.  Qty: 30 tablet, Refills: 2    Comments: .         CONTINUE these medications  which have CHANGED    Details   atorvastatin (LIPITOR) 20 MG tablet Take 1 tablet (20 mg total) by mouth once daily.  Qty: 30 tablet, Refills: 2         CONTINUE these medications which have NOT CHANGED    Details   ferrous sulfate (FEOSOL) 325 mg (65 mg iron) Tab tablet Take 1 tablet by mouth twice daily  Qty: 90 tablet, Refills: 5    Associated Diagnoses: Anemia, unspecified type      fish oil-omega-3 fatty acids 300-1,000 mg capsule Take by mouth once daily.      levothyroxine (SYNTHROID) 200 MCG tablet Take 1 tablet by mouth once daily  Qty: 90 tablet, Refills: 5    Associated Diagnoses: Hypothyroidism, unspecified type      metoprolol succinate (TOPROL-XL) 100 MG 24 hr tablet Take 1 tablet by mouth once daily  Qty: 90 tablet, Refills: 5    Associated Diagnoses: Essential hypertension      multivitamin capsule Take 1 capsule by mouth once daily.      tamsulosin (FLOMAX) 0.4 mg Cap Take 1 capsule by mouth once daily  Qty: 90 capsule, Refills: 5    Associated Diagnoses: Benign prostatic hyperplasia, unspecified whether lower urinary tract symptoms present         STOP taking these medications       aspirin (ECOTRIN) 81 MG EC tablet Comments: Follow up with [facility] regarding restarting aspirin.  Reason for Stopping: Nontraumatic subcortical hemorrhage of right cerebral hemisphere.                   _________________________________  Job Collins MD  12/14/2020

## 2020-12-14 NOTE — PLAN OF CARE
12/13: Patient reports unable to sleep at night - melatonin nightly ordered.     [facility] submitted for authorization.       12/14/20 9968   Discharge Reassessment   Assessment Type Discharge Planning Reassessment   Provided patient/caregiver education on the expected discharge date and the discharge plan Yes   Do you have any problems affording any of your prescribed medications? No   Discharge Plan A Rehab   Discharge Plan B Home Health   DME Needed Upon Discharge  other (see comments)  (tbd)   Patient choice form signed by patient/caregiver N/A   Anticipated Discharge Disposition Rehab   Can the patient/caregiver answer the patient profile reliably? Yes, cognitively intact   How does the patient rate their overall health at the present time? Fair   Describe the patient's ability to walk at the present time. Major restrictions/daily assistance from another person   How often would a person be available to care for the patient? Often   Number of comorbid conditions (as recorded on the chart) Five or more   During the past month, has the patient often been bothered by feeling down, depressed or hopeless? No   During the past month, has the patient often been bothered by little interest or pleasure in doing things? No   Post-Acute Status   Post-Acute Authorization Placement   Post-Acute Placement Status Pending Payor Review   Discharge Delays None known at this time     Dinora Cam RN  Case Management  Ext: [phone]

## 2020-12-14 NOTE — PROGRESS NOTES
[facility]  Vascular Neurology  Comprehensive Stroke Center  Progress Note    Assessment/Plan:     * 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 is a 80 y.o. male with a significant medical history of BPH, GERD, HLD, HTN, OA, CAD, and hypothyroidism who presents to the hospital as a transfer from [facility] for evaluation of ICH.  He states his leg gave out causing him to fall and he was unable to get up without assistance.  A CTH revealed a right basal ganglia hemorrhage. CTA H&N showed R basal ganglia changes but was otherwise unremarkable. Etiology HTN. MRI w and wo ordered by NSGY - can not exclude underlying mass lesion. Patient to follow up in NSGY clinic 4-6 weeks for repeat MRI w wo contrast per their recommendations.     Antithrombotics for secondary stroke prevention: Antiplatelets: None: Intracerebral Hemorrhage    Statins for secondary stroke prevention and hyperlipidemia, if present:   Statins: Atorvastatin- 20 mg daily    Aggressive risk factor modification: HTN, HLD, CAD     Rehab efforts: The patient has been evaluated by a stroke team provider and the therapy needs have been fully considered based off the presenting complaints and exam findings. The following therapy evaluations are needed: PT evaluate and treat, OT evaluate and treat, SLP evaluate and treat, PM&R evaluate for appropriate placement - recommending inpatient rehab, placement pending    Diagnostics ordered/pending: NA    VTE prophylaxis: Mechanical prophylaxis: FREDIS hose   Pharmacological VTE Prophylaxis: heparin 5,000 U subcutaneous Q8H    BP parameters: ICH: SBP <160        Mixed hyperlipidemia  Stroke risk factor  The patient is prescribed atorvastatin 80 mg daily as outpatient  LDL 31.6  Resume atorvastatin 20 mg daily for now    Vasogenic brain edema  Moderate area of vasogenic cerebral edema identified when reviewing brain imaging in the territory of  the R middle cerebral artery. There is mass effect associated with it. We will continue to monitor the patients clinical exam for any worsening of symptoms which may indicate expansion of the stroke or the area of the edema resulting in the clinical change. The pattern is suggestive of hypertensive etiology.            CAD (coronary artery disease)  Stroke risk factor  Hold ASA for now 2/2 ICH    Hypothyroidism  Stroke risk factor  Continue home dose levothyroxine 200 mcg daily  TSH 0.184 with free T4 of 1.13 on admission    Essential hypertension  Stroke risk factor  SBP < 160 mmHg  Resume Toprol- mg daily and Norvasc 10 mg daily for now  Continue with labetalol 10 mg IV Q4H PRN for systolic BP readings > 160 mmHg         12/10: NAEON; patient neuro exam stable; plan to step down to vascular neurology today  12/11: Patient stepped down overnight. BP elevated - switched metoprolol to home dose and started Norvasc 5 mg. Will titrate up as needed. Waiting on rehab placement.   12/12: NAEON. Increasing Norvasc to 10 mg. Pending rehab placement.   12/13: Patient reports unable to sleep at night - melatonin nightly ordered.   12/14: No acute events overnight, some complaints of headache this morning, pending inpatient rehab placement at this time.    STROKE DOCUMENTATION        NIH Scale:  1a. Level of Consciousness: 0-->Alert, keenly responsive  1b. LOC Questions: 1-->Answers one question correctly  1c. LOC Commands: 0-->Performs both tasks correctly  2. Best Gaze: 0-->Normal(Baseline amblyopia on left, left MFL involvement?)  3. Visual: 0-->No visual loss  4. Facial Palsy: 1-->Minor paralysis (flattened nasolabial fold, asymmetry on smiling)  5a. Motor Arm, Left: 0-->No drift, limb holds 90 (or 45) degrees for full 10 secs  5b. Motor Arm, Right: 0-->No drift, limb holds 90 (or 45) degrees for full 10 secs  6a. Motor Leg, Left: 0-->No drift, leg holds 30 degree position for full 5 secs  6b. Motor Leg, Right:  "0-->No drift, leg holds 30 degree position for full 5 secs  7. Limb Ataxia: 0-->Absent  8. Sensory: 0-->Normal, no sensory loss  9. Best Language: 0-->No aphasia, normal  10. Dysarthria: 0-->Normal  11. Extinction and Inattention (formerly Neglect): 0-->No abnormality  Total (NIH Stroke Scale): 2     Hemorrhagic change of an Ischemic Stroke: Does this patient have an ischemic stroke with hemorrhagic changes? No     Neurologic Chief Complaint: R Basal Ganglia Hemorrhage    Subjective:     Interval History: Patient seen and examined this AM. No acute events overnight. Still reports some left upper extremity "numbness" although dull sensory sensation remains intact without difference. Also with some complaints of headache this morning for which he received PRN Tylenol. Otherwise he is afebrile with pulse ranging from 70-60s bpm. Systolic blood readings ranging from 140-130s mmHg. UOP remains adequate with ~400 mL urine output in the last 24 hrs but with four unmeasured voids. Pending inpatient rehab placement at this time. To continue working with PT and OT in the meantime.     HPI, Past Medical, Family, and Social History remains the same as documented in the initial encounter.      Review of Systems   Constitutional: Negative for chills, diaphoresis and fever.   HENT: Negative for trouble swallowing and voice change.    Eyes: Negative for photophobia and visual disturbance.   Respiratory: Negative for cough, shortness of breath and wheezing.    Cardiovascular: Negative for chest pain, palpitations and leg swelling.   Gastrointestinal: Negative for abdominal distention, abdominal pain, blood in stool, constipation, diarrhea, nausea and vomiting.   Genitourinary: Negative for difficulty urinating, dysuria and hematuria.   Musculoskeletal: Negative for joint swelling and neck pain.   Allergic/Immunologic: Negative for environmental allergies and food allergies.   Neurological: Positive for facial asymmetry, weakness, " numbness and headaches. Negative for dizziness, tremors, seizures, syncope, speech difficulty and light-headedness.   Psychiatric/Behavioral: Negative for confusion and sleep disturbance.     Objective:     Vital Signs (Most Recent):  Temp: 97.8 °F (36.6 °C) (12/14/20 0729)  Pulse: 62 (12/14/20 0729)  Resp: 16 (12/14/20 0729)  BP: (!) 147/65 (12/14/20 0729)  SpO2: 100 % (12/14/20 0729)    Vital Signs Range (Last 24H):  Temp:  [97.8 °F (36.6 °C)-99 °F (37.2 °C)]   Pulse:  [62-76]   Resp:  [16-20]   BP: (133-156)/(65-85)   SpO2:  [95 %-100 %]     Physical Exam  Vitals signs and nursing note reviewed.   Constitutional:       General: He is not in acute distress.     Appearance: He is well-developed. He is not diaphoretic.   Eyes:      Extraocular Movements:      Right eye: Abnormal extraocular motion (chronic since childhood) present.      Left eye: Abnormal extraocular motion (chronic since childhood) present.      Conjunctiva/sclera: Conjunctivae normal.      Pupils:      Right eye: Pupil is not round.   Neck:      Musculoskeletal: Normal range of motion and neck supple.   Cardiovascular:      Rate and Rhythm: Normal rate and regular rhythm.   Pulmonary:      Breath sounds: Normal breath sounds.   Abdominal:      Palpations: Abdomen is soft.   Skin:     General: Skin is warm and dry.      Capillary Refill: Capillary refill takes less than 2 seconds.   Neurological:      Mental Status: He is alert and oriented to person, place, and time.      Motor: Weakness present.       Neurological Exam:   LOC: alert  Attention Span: Good   Language: No aphasia  Articulation: No dysarthria  Orientation: Person, Place, Time   Facial Movement (CN VII): left facial droop   Motor: Arm left  Normal 5/5  Leg left  Paresis: 5/5  Arm right  Normal 5/5  Leg right Normal 5/5  Sensation: Intact to light touch, temperature and vibration    Laboratory:  CMP:   No results for input(s): GLUCOSE, CALCIUM, ALBUMIN, PROT, NA, K, CO2, CL, BUN,  CREATININE, ALKPHOS, ALT, AST, BILITOT in the last 24 hours.  CBC:   Recent Labs   Lab 12/13/20  0500   WBC 5.48   RBC 4.17*   HGB 13.7*   HCT 40.1   *   MCV 96   MCH 32.9*   MCHC 34.2     Lipid Panel:   Recent Labs   Lab 12/09/20  0233   CHOL 71*   LDLCALC 31.6*   HDL 27*   TRIG 62     Coagulation:   Recent Labs   Lab 12/08/20  2104   INR 1.1   APTT 24.8     Hgb A1C:   Recent Labs   Lab 12/09/20  0233   HGBA1C 5.2     TSH:   Recent Labs   Lab 12/09/20  0233   TSH 0.184*       Diagnostic Results:    Brain imaging:  CTH 12/8/2020   2.5 cm right basal ganglia hemorrhage at the level of the thalamus/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  There is no given history of hypertension and correlation is needed with follow-up recommended to exclude other etiology for hemorrhage.  There is mild 6 mm right to left shift of midline structures but no significant herniation.  No evidence of intraventricular hemorrhage, hydrocephalus or other sick convincing acute finding.  Extensive white matter low density likely is related to chronic small vessel ischemic changes. Component of white matter infarct is not excluded.  MRI with diffusion imaging could further evaluate.  No evidence of major arterial infarction.  Atrophy and additional incidental nonacute findings as above.      Vessel Imaging:  CTA Head and Neck 12/9/20  Right thalamic parenchymal hemorrhage with no new or worsening regional mass effect compared to 12/08/2020 head CT.  No new hemorrhage is elsewhere.     Unremarkable CTA of the head and neck.    Cardiac Evaluation:   TTE 12/9/20  · The left ventricle is normal in size with normal systolic function. The estimated ejection fraction is 65%.  · Normal left ventricular diastolic function.  · Normal right ventricular size with normal right ventricular systolic function.  · Normal central venous pressure (3 mmHg).  · The estimated PA systolic pressure is 29 mmHg.      Job Collins,  MD  Comprehensive Stroke Center  Department of Vascular Neurology   [facility]

## 2020-12-14 NOTE — ASSESSMENT & PLAN NOTE
Stroke risk factor  Continue home dose levothyroxine 200 mcg daily  TSH 0.184 with free T4 of 1.13 on admission

## 2020-12-15 NOTE — NURSING
Patient discharged via w/c to [facility]. Daughter to ride with him. AAox4, no s/sx of distress noted.

## 2020-12-15 NOTE — PLAN OF CARE
Patient medically ready for discharge to [facility]. Any necessary transport setup by . This CM scheduled or requested necessary follow-up appointments. Family/patient aware of discharge.    No future appointments.       12/15/20 0744   Final Note   Assessment Type Final Discharge Note   Anticipated Discharge Disposition Rehab   Hospital Follow Up  Appt(s) scheduled? No   Discharge plans and expectations educations in teach back method with documentation complete? Yes   Right Care Referral Info   Post Acute Recommendation IRF   Facility Name [facility]       Dinora Cam RN  Case Management  Ext: [phone]  12/15/2020

## 2020-12-15 NOTE — PT/OT/SLP PROGRESS
Physical Therapy  Continue Current Plan of Care     Patient Name:  Hever Reyes Jr.   MRN:  [id number]  Admitting Diagnosis:  Nontraumatic subcortical hemorrhage of right cerebral hemisphere   Recent Surgery: * No surgery found *    Admit Date: 12/9/2020  Length of Stay: 5 days    Patient not seen on this date, however per chart review pt continues to benefit from acute PT services. PT to follow up and POC allows. Please continue progressive mobility as appropriate.    Discharge Disposition Recommendation: Rehab    Vivian Galindo PTA  12/14/2020  Pager: [phone]

## 2020-12-16 ENCOUNTER — TELEPHONE (OUTPATIENT)
Dept: NEUROLOGY | Facility: CLINIC | Age: 80
End: 2020-12-16

## 2020-12-17 NOTE — PT/OT/SLP DISCHARGE
Occupational Therapy Discharge Summary    Hever Reyes Jr.  MRN: [id number]   Principal Problem: Nontraumatic subcortical hemorrhage of right cerebral hemisphere      Patient Discharged from acute Occupational Therapy on 12/14/2020.  Please refer to prior OT note dated 12/14/2020 for functional status.    Assessment:      Goals partially met.    Objective:     GOALS:   Multidisciplinary Problems     Occupational Therapy Goals        Problem: Occupational Therapy Goal    Goal Priority Disciplines Outcome Interventions   Occupational Therapy Goal     OT, PT/OT Ongoing, Progressing    Description: Goals to be met by: 12/23     Patient will increase functional independence with ADLs by performing:    UE Dressing with Contact Guard Assistance.  LE Dressing (pants, brief) with Contact Guard Assistance.  Grooming while standing at sink with Contact Guard Assistance. met  Toileting from toilet with Minimal Assistance for hygiene and clothing management. progressing  Supine to sit with Contact Guard Assistance.  Toilet transfer to toilet with Contact Guard Assistance. Met with RW  Functional mobility at household distance for ADL task with min(A). Met with RW  Pt will verbalize s/s of stroke with teachback.                    Reasons for Discontinuation of Therapy Services  Transfer to alternate level of care.      Plan:     Patient Discharged to: Inpatient Rehab    ROULA Choe  12/17/2020

## 2020-12-18 ENCOUNTER — TELEPHONE (OUTPATIENT)
Dept: PRIMARY CARE CLINIC | Facility: CLINIC | Age: 80
End: 2020-12-18

## 2020-12-18 NOTE — TELEPHONE ENCOUNTER
Chiquita lazo that yes Dr. Carvalho will follow the pt. For home health - has a follow up apt on 1/5/2021. We will submit HH orders at TO. Pt. Has not been seen since April of 2019 and was recently hospitalized

## 2020-12-18 NOTE — TELEPHONE ENCOUNTER
----- Message from Payal Oliveira sent at 12/18/2020  1:42 PM CST -----  Contact: Maia with [facility] phone [phone]  Maia with [facility] phone [phone], Calling to confirm Dr Carvalho will follow patient's homehealth care. Please call her. Thanks.

## 2020-12-19 PROCEDURE — G0180 MD CERTIFICATION HHA PATIENT: HCPCS | Mod: ,,, | Performed by: INTERNAL MEDICINE

## 2020-12-19 PROCEDURE — G0180 PR HOME HEALTH MD CERTIFICATION: ICD-10-PCS | Mod: ,,, | Performed by: INTERNAL MEDICINE

## 2020-12-28 ENCOUNTER — TELEPHONE (OUTPATIENT)
Dept: PRIMARY CARE CLINIC | Facility: CLINIC | Age: 80
End: 2020-12-28

## 2020-12-28 NOTE — TELEPHONE ENCOUNTER
----- Message from Dolores Stoner sent at 12/28/2020  9:29 AM CST -----  Contact: [phone]  Pt would like call back regarding medications

## 2021-01-04 ENCOUNTER — DOCUMENT SCAN (OUTPATIENT)
Dept: HOME HEALTH SERVICES | Facility: HOSPITAL | Age: 81
End: 2021-01-04
Payer: MEDICARE

## 2021-01-05 ENCOUNTER — OFFICE VISIT (OUTPATIENT)
Dept: PRIMARY CARE CLINIC | Facility: CLINIC | Age: 81
End: 2021-01-05
Payer: MEDICARE

## 2021-01-05 VITALS
RESPIRATION RATE: 18 BRPM | WEIGHT: 192.56 LBS | BODY MASS INDEX: 32.08 KG/M2 | HEART RATE: 71 BPM | TEMPERATURE: 98 F | OXYGEN SATURATION: 97 % | HEIGHT: 65 IN | SYSTOLIC BLOOD PRESSURE: 108 MMHG | DIASTOLIC BLOOD PRESSURE: 64 MMHG

## 2021-01-05 DIAGNOSIS — Z12.5 SCREENING FOR PROSTATE CANCER: ICD-10-CM

## 2021-01-05 DIAGNOSIS — R74.8 ELEVATED LIVER ENZYMES: Primary | ICD-10-CM

## 2021-01-05 DIAGNOSIS — E03.9 ACQUIRED HYPOTHYROIDISM: ICD-10-CM

## 2021-01-05 DIAGNOSIS — N40.0 BENIGN PROSTATIC HYPERPLASIA WITHOUT LOWER URINARY TRACT SYMPTOMS: ICD-10-CM

## 2021-01-05 DIAGNOSIS — I61.0 NONTRAUMATIC SUBCORTICAL HEMORRHAGE OF RIGHT CEREBRAL HEMISPHERE: ICD-10-CM

## 2021-01-05 DIAGNOSIS — I10 ESSENTIAL HYPERTENSION: ICD-10-CM

## 2021-01-05 PROCEDURE — 3074F SYST BP LT 130 MM HG: CPT | Mod: CPTII,S$GLB,, | Performed by: INTERNAL MEDICINE

## 2021-01-05 PROCEDURE — 99214 PR OFFICE/OUTPT VISIT, EST, LEVL IV, 30-39 MIN: ICD-10-PCS | Mod: S$GLB,,, | Performed by: INTERNAL MEDICINE

## 2021-01-05 PROCEDURE — 99499 UNLISTED E&M SERVICE: CPT | Mod: S$GLB,,, | Performed by: INTERNAL MEDICINE

## 2021-01-05 PROCEDURE — 3078F PR MOST RECENT DIASTOLIC BLOOD PRESSURE < 80 MM HG: ICD-10-PCS | Mod: CPTII,S$GLB,, | Performed by: INTERNAL MEDICINE

## 2021-01-05 PROCEDURE — 99214 OFFICE O/P EST MOD 30 MIN: CPT | Mod: S$GLB,,, | Performed by: INTERNAL MEDICINE

## 2021-01-05 PROCEDURE — 3078F DIAST BP <80 MM HG: CPT | Mod: CPTII,S$GLB,, | Performed by: INTERNAL MEDICINE

## 2021-01-05 PROCEDURE — 3288F PR FALLS RISK ASSESSMENT DOCUMENTED: ICD-10-PCS | Mod: CPTII,S$GLB,, | Performed by: INTERNAL MEDICINE

## 2021-01-05 PROCEDURE — 1101F PT FALLS ASSESS-DOCD LE1/YR: CPT | Mod: CPTII,S$GLB,, | Performed by: INTERNAL MEDICINE

## 2021-01-05 PROCEDURE — 3074F PR MOST RECENT SYSTOLIC BLOOD PRESSURE < 130 MM HG: ICD-10-PCS | Mod: CPTII,S$GLB,, | Performed by: INTERNAL MEDICINE

## 2021-01-05 PROCEDURE — 99499 RISK ADDL DX/OHS AUDIT: ICD-10-PCS | Mod: S$GLB,,, | Performed by: INTERNAL MEDICINE

## 2021-01-05 PROCEDURE — 1126F PR PAIN SEVERITY QUANTIFIED, NO PAIN PRESENT: ICD-10-PCS | Mod: S$GLB,,, | Performed by: INTERNAL MEDICINE

## 2021-01-05 PROCEDURE — 1159F MED LIST DOCD IN RCRD: CPT | Mod: S$GLB,,, | Performed by: INTERNAL MEDICINE

## 2021-01-05 PROCEDURE — 3288F FALL RISK ASSESSMENT DOCD: CPT | Mod: CPTII,S$GLB,, | Performed by: INTERNAL MEDICINE

## 2021-01-05 PROCEDURE — 1159F PR MEDICATION LIST DOCUMENTED IN MEDICAL RECORD: ICD-10-PCS | Mod: S$GLB,,, | Performed by: INTERNAL MEDICINE

## 2021-01-05 PROCEDURE — 1101F PR PT FALLS ASSESS DOC 0-1 FALLS W/OUT INJ PAST YR: ICD-10-PCS | Mod: CPTII,S$GLB,, | Performed by: INTERNAL MEDICINE

## 2021-01-05 PROCEDURE — 1126F AMNT PAIN NOTED NONE PRSNT: CPT | Mod: S$GLB,,, | Performed by: INTERNAL MEDICINE

## 2021-01-05 PROCEDURE — 99999 PR PBB SHADOW E&M-EST. PATIENT-LVL IV: CPT | Mod: PBBFAC,,, | Performed by: INTERNAL MEDICINE

## 2021-01-05 PROCEDURE — 99999 PR PBB SHADOW E&M-EST. PATIENT-LVL IV: ICD-10-PCS | Mod: PBBFAC,,, | Performed by: INTERNAL MEDICINE

## 2021-01-05 RX ORDER — INFLUENZA A VIRUS A/MICHIGAN/45/2015 X-275 (H1N1) ANTIGEN (FORMALDEHYDE INACTIVATED), INFLUENZA A VIRUS A/SINGAPORE/INFIMH-16-0019/2016 IVR-186 (H3N2) ANTIGEN (FORMALDEHYDE INACTIVATED), INFLUENZA B VIRUS B/PHUKET/3073/2013 ANTIGEN (FORMALDEHYDE INACTIVATED), AND INFLUENZA B VIRUS B/MARYLAND/15/2016 BX-69A ANTIGEN (FORMALDEHYDE INACTIVATED) 60; 60; 60; 60 UG/.7ML; UG/.7ML; UG/.7ML; UG/.7ML
INJECTION, SUSPENSION INTRAMUSCULAR
COMMUNITY
Start: 2020-11-06 | End: 2021-02-18

## 2021-01-06 ENCOUNTER — EXTERNAL HOME HEALTH (OUTPATIENT)
Dept: HOME HEALTH SERVICES | Facility: HOSPITAL | Age: 81
End: 2021-01-06
Payer: MEDICARE

## 2021-01-13 ENCOUNTER — IMMUNIZATION (OUTPATIENT)
Dept: PRIMARY CARE CLINIC | Facility: CLINIC | Age: 81
End: 2021-01-13
Payer: MEDICARE

## 2021-01-13 DIAGNOSIS — Z23 NEED FOR VACCINATION: ICD-10-CM

## 2021-01-13 PROCEDURE — 91300 COVID-19, MRNA, LNP-S, PF, 30 MCG/0.3 ML DOSE VACCINE: CPT | Mod: PBBFAC | Performed by: EMERGENCY MEDICINE

## 2021-01-19 ENCOUNTER — OFFICE VISIT (OUTPATIENT)
Dept: NEUROLOGY | Facility: CLINIC | Age: 81
End: 2021-01-19
Payer: MEDICARE

## 2021-01-19 VITALS
BODY MASS INDEX: 32.1 KG/M2 | SYSTOLIC BLOOD PRESSURE: 118 MMHG | WEIGHT: 192.69 LBS | HEIGHT: 65 IN | HEART RATE: 73 BPM | DIASTOLIC BLOOD PRESSURE: 73 MMHG

## 2021-01-19 DIAGNOSIS — I69.154 HEMIPARESIS OF LEFT NONDOMINANT SIDE AS LATE EFFECT OF NONTRAUMATIC INTRACEREBRAL HEMORRHAGE: ICD-10-CM

## 2021-01-19 DIAGNOSIS — I10 ESSENTIAL HYPERTENSION: ICD-10-CM

## 2021-01-19 DIAGNOSIS — I25.2 HISTORY OF MYOCARDIAL INFARCTION: Primary | ICD-10-CM

## 2021-01-19 DIAGNOSIS — S06.360A TRAUMATIC HEMORRHAGE OF CEREBRUM WITHOUT LOSS OF CONSCIOUSNESS, UNSPECIFIED LATERALITY, INITIAL ENCOUNTER: ICD-10-CM

## 2021-01-19 DIAGNOSIS — I69.30 HISTORY OF HEMORRHAGIC CEREBROVASCULAR ACCIDENT (CVA) WITH RESIDUAL DEFICIT: ICD-10-CM

## 2021-01-19 PROBLEM — G93.6 VASOGENIC BRAIN EDEMA: Status: RESOLVED | Noted: 2020-12-09 | Resolved: 2021-01-19

## 2021-01-19 PROBLEM — I61.0 NONTRAUMATIC SUBCORTICAL HEMORRHAGE OF RIGHT CEREBRAL HEMISPHERE: Status: RESOLVED | Noted: 2020-12-09 | Resolved: 2021-01-19

## 2021-01-19 PROCEDURE — 99499 UNLISTED E&M SERVICE: CPT | Mod: S$GLB,,, | Performed by: PSYCHIATRY & NEUROLOGY

## 2021-01-19 PROCEDURE — 3074F PR MOST RECENT SYSTOLIC BLOOD PRESSURE < 130 MM HG: ICD-10-PCS | Mod: CPTII,S$GLB,, | Performed by: PSYCHIATRY & NEUROLOGY

## 2021-01-19 PROCEDURE — 1159F MED LIST DOCD IN RCRD: CPT | Mod: S$GLB,,, | Performed by: PSYCHIATRY & NEUROLOGY

## 2021-01-19 PROCEDURE — 3074F SYST BP LT 130 MM HG: CPT | Mod: CPTII,S$GLB,, | Performed by: PSYCHIATRY & NEUROLOGY

## 2021-01-19 PROCEDURE — 1126F PR PAIN SEVERITY QUANTIFIED, NO PAIN PRESENT: ICD-10-PCS | Mod: S$GLB,,, | Performed by: PSYCHIATRY & NEUROLOGY

## 2021-01-19 PROCEDURE — 1100F PR PT FALLS ASSESS DOC 2+ FALLS/FALL W/INJURY/YR: ICD-10-PCS | Mod: CPTII,S$GLB,, | Performed by: PSYCHIATRY & NEUROLOGY

## 2021-01-19 PROCEDURE — 3078F PR MOST RECENT DIASTOLIC BLOOD PRESSURE < 80 MM HG: ICD-10-PCS | Mod: CPTII,S$GLB,, | Performed by: PSYCHIATRY & NEUROLOGY

## 2021-01-19 PROCEDURE — 1100F PTFALLS ASSESS-DOCD GE2>/YR: CPT | Mod: CPTII,S$GLB,, | Performed by: PSYCHIATRY & NEUROLOGY

## 2021-01-19 PROCEDURE — 99499 RISK ADDL DX/OHS AUDIT: ICD-10-PCS | Mod: S$GLB,,, | Performed by: PSYCHIATRY & NEUROLOGY

## 2021-01-19 PROCEDURE — 1126F AMNT PAIN NOTED NONE PRSNT: CPT | Mod: S$GLB,,, | Performed by: PSYCHIATRY & NEUROLOGY

## 2021-01-19 PROCEDURE — 99215 PR OFFICE/OUTPT VISIT, EST, LEVL V, 40-54 MIN: ICD-10-PCS | Mod: S$GLB,,, | Performed by: PSYCHIATRY & NEUROLOGY

## 2021-01-19 PROCEDURE — 3288F FALL RISK ASSESSMENT DOCD: CPT | Mod: CPTII,S$GLB,, | Performed by: PSYCHIATRY & NEUROLOGY

## 2021-01-19 PROCEDURE — 3078F DIAST BP <80 MM HG: CPT | Mod: CPTII,S$GLB,, | Performed by: PSYCHIATRY & NEUROLOGY

## 2021-01-19 PROCEDURE — 99215 OFFICE O/P EST HI 40 MIN: CPT | Mod: S$GLB,,, | Performed by: PSYCHIATRY & NEUROLOGY

## 2021-01-19 PROCEDURE — 1159F PR MEDICATION LIST DOCUMENTED IN MEDICAL RECORD: ICD-10-PCS | Mod: S$GLB,,, | Performed by: PSYCHIATRY & NEUROLOGY

## 2021-01-19 PROCEDURE — 3288F PR FALLS RISK ASSESSMENT DOCUMENTED: ICD-10-PCS | Mod: CPTII,S$GLB,, | Performed by: PSYCHIATRY & NEUROLOGY

## 2021-01-19 PROCEDURE — 99999 PR PBB SHADOW E&M-EST. PATIENT-LVL IV: ICD-10-PCS | Mod: PBBFAC,,, | Performed by: PSYCHIATRY & NEUROLOGY

## 2021-01-19 PROCEDURE — 99999 PR PBB SHADOW E&M-EST. PATIENT-LVL IV: CPT | Mod: PBBFAC,,, | Performed by: PSYCHIATRY & NEUROLOGY

## 2021-01-20 ENCOUNTER — TELEPHONE (OUTPATIENT)
Dept: NEUROLOGY | Facility: HOSPITAL | Age: 81
End: 2021-01-20

## 2021-01-25 ENCOUNTER — OFFICE VISIT (OUTPATIENT)
Dept: NEUROSURGERY | Facility: CLINIC | Age: 81
End: 2021-01-25
Payer: MEDICARE

## 2021-01-25 ENCOUNTER — HOSPITAL ENCOUNTER (OUTPATIENT)
Dept: RADIOLOGY | Facility: HOSPITAL | Age: 81
Discharge: HOME | End: 2021-01-25
Attending: PHYSICIAN ASSISTANT
Payer: MEDICARE

## 2021-01-25 VITALS — DIASTOLIC BLOOD PRESSURE: 73 MMHG | HEART RATE: 75 BPM | SYSTOLIC BLOOD PRESSURE: 103 MMHG

## 2021-01-25 DIAGNOSIS — I61.0 NONTRAUMATIC SUBCORTICAL HEMORRHAGE OF RIGHT CEREBRAL HEMISPHERE: Primary | ICD-10-CM

## 2021-01-25 DIAGNOSIS — I61.0 NONTRAUMATIC SUBCORTICAL HEMORRHAGE OF CEREBRAL HEMISPHERE, UNSPECIFIED LATERALITY: ICD-10-CM

## 2021-01-25 DIAGNOSIS — I69.154 HEMIPARESIS OF LEFT NONDOMINANT SIDE AS LATE EFFECT OF NONTRAUMATIC INTRACEREBRAL HEMORRHAGE: ICD-10-CM

## 2021-01-25 PROCEDURE — 1159F PR MEDICATION LIST DOCUMENTED IN MEDICAL RECORD: ICD-10-PCS | Mod: S$GLB,,, | Performed by: PHYSICIAN ASSISTANT

## 2021-01-25 PROCEDURE — 99213 PR OFFICE/OUTPT VISIT, EST, LEVL III, 20-29 MIN: ICD-10-PCS | Mod: S$GLB,,, | Performed by: PHYSICIAN ASSISTANT

## 2021-01-25 PROCEDURE — 1159F MED LIST DOCD IN RCRD: CPT | Mod: S$GLB,,, | Performed by: PHYSICIAN ASSISTANT

## 2021-01-25 PROCEDURE — 3078F DIAST BP <80 MM HG: CPT | Mod: CPTII,S$GLB,, | Performed by: PHYSICIAN ASSISTANT

## 2021-01-25 PROCEDURE — 3074F PR MOST RECENT SYSTOLIC BLOOD PRESSURE < 130 MM HG: ICD-10-PCS | Mod: CPTII,S$GLB,, | Performed by: PHYSICIAN ASSISTANT

## 2021-01-25 PROCEDURE — A9585 GADOBUTROL INJECTION: HCPCS | Performed by: PHYSICIAN ASSISTANT

## 2021-01-25 PROCEDURE — 99213 OFFICE O/P EST LOW 20 MIN: CPT | Mod: S$GLB,,, | Performed by: PHYSICIAN ASSISTANT

## 2021-01-25 PROCEDURE — 3288F FALL RISK ASSESSMENT DOCD: CPT | Mod: CPTII,S$GLB,, | Performed by: PHYSICIAN ASSISTANT

## 2021-01-25 PROCEDURE — 3288F PR FALLS RISK ASSESSMENT DOCUMENTED: ICD-10-PCS | Mod: CPTII,S$GLB,, | Performed by: PHYSICIAN ASSISTANT

## 2021-01-25 PROCEDURE — 70553 MRI BRAIN STEM W/O & W/DYE: CPT | Mod: 26,,, | Performed by: RADIOLOGY

## 2021-01-25 PROCEDURE — 3074F SYST BP LT 130 MM HG: CPT | Mod: CPTII,S$GLB,, | Performed by: PHYSICIAN ASSISTANT

## 2021-01-25 PROCEDURE — 1126F PR PAIN SEVERITY QUANTIFIED, NO PAIN PRESENT: ICD-10-PCS | Mod: S$GLB,,, | Performed by: PHYSICIAN ASSISTANT

## 2021-01-25 PROCEDURE — 1100F PR PT FALLS ASSESS DOC 2+ FALLS/FALL W/INJURY/YR: ICD-10-PCS | Mod: CPTII,S$GLB,, | Performed by: PHYSICIAN ASSISTANT

## 2021-01-25 PROCEDURE — 70450 CT HEAD WITHOUT CONTRAST: ICD-10-PCS | Mod: 26,,, | Performed by: RADIOLOGY

## 2021-01-25 PROCEDURE — 1100F PTFALLS ASSESS-DOCD GE2>/YR: CPT | Mod: CPTII,S$GLB,, | Performed by: PHYSICIAN ASSISTANT

## 2021-01-25 PROCEDURE — 3078F PR MOST RECENT DIASTOLIC BLOOD PRESSURE < 80 MM HG: ICD-10-PCS | Mod: CPTII,S$GLB,, | Performed by: PHYSICIAN ASSISTANT

## 2021-01-25 PROCEDURE — 70450 CT HEAD/BRAIN W/O DYE: CPT | Mod: 26,,, | Performed by: RADIOLOGY

## 2021-01-25 PROCEDURE — 1126F AMNT PAIN NOTED NONE PRSNT: CPT | Mod: S$GLB,,, | Performed by: PHYSICIAN ASSISTANT

## 2021-01-25 PROCEDURE — 99999 PR PBB SHADOW E&M-EST. PATIENT-LVL III: CPT | Mod: PBBFAC,,, | Performed by: PHYSICIAN ASSISTANT

## 2021-01-25 PROCEDURE — 25500020 PHARM REV CODE 255: Performed by: PHYSICIAN ASSISTANT

## 2021-01-25 PROCEDURE — 70450 CT HEAD/BRAIN W/O DYE: CPT | Mod: TC

## 2021-01-25 PROCEDURE — 70553 MRI BRAIN STEM W/O & W/DYE: CPT | Mod: TC

## 2021-01-25 PROCEDURE — 99999 PR PBB SHADOW E&M-EST. PATIENT-LVL III: ICD-10-PCS | Mod: PBBFAC,,, | Performed by: PHYSICIAN ASSISTANT

## 2021-01-25 PROCEDURE — 70553 MRI BRAIN W WO CONTRAST: ICD-10-PCS | Mod: 26,,, | Performed by: RADIOLOGY

## 2021-01-25 RX ORDER — ASPIRIN 81 MG/1
81 TABLET ORAL DAILY
COMMUNITY
End: 2021-12-08 | Stop reason: SDUPTHER

## 2021-01-25 RX ORDER — GADOBUTROL 604.72 MG/ML
9 INJECTION INTRAVENOUS
Status: COMPLETED | OUTPATIENT
Start: 2021-01-25 | End: 2021-01-25

## 2021-01-25 RX ADMIN — GADOBUTROL 9 ML: 604.72 INJECTION INTRAVENOUS at 09:01

## 2021-02-03 ENCOUNTER — IMMUNIZATION (OUTPATIENT)
Dept: PRIMARY CARE CLINIC | Facility: CLINIC | Age: 81
End: 2021-02-03
Payer: MEDICARE

## 2021-02-03 DIAGNOSIS — Z23 NEED FOR VACCINATION: Primary | ICD-10-CM

## 2021-02-03 PROCEDURE — 0002A COVID-19, MRNA, LNP-S, PF, 30 MCG/0.3 ML DOSE VACCINE: CPT | Mod: CV19,S$GLB,, | Performed by: EMERGENCY MEDICINE

## 2021-02-03 PROCEDURE — 91300 COVID-19, MRNA, LNP-S, PF, 30 MCG/0.3 ML DOSE VACCINE: ICD-10-PCS | Mod: S$GLB,,, | Performed by: EMERGENCY MEDICINE

## 2021-02-03 PROCEDURE — 91300 COVID-19, MRNA, LNP-S, PF, 30 MCG/0.3 ML DOSE VACCINE: CPT | Mod: S$GLB,,, | Performed by: EMERGENCY MEDICINE

## 2021-02-03 PROCEDURE — 0002A COVID-19, MRNA, LNP-S, PF, 30 MCG/0.3 ML DOSE VACCINE: ICD-10-PCS | Mod: CV19,S$GLB,, | Performed by: EMERGENCY MEDICINE

## 2021-02-18 ENCOUNTER — OFFICE VISIT (OUTPATIENT)
Dept: PRIMARY CARE CLINIC | Facility: CLINIC | Age: 81
End: 2021-02-18
Payer: MEDICARE

## 2021-02-18 VITALS
WEIGHT: 189.06 LBS | DIASTOLIC BLOOD PRESSURE: 60 MMHG | SYSTOLIC BLOOD PRESSURE: 110 MMHG | HEIGHT: 65 IN | HEART RATE: 67 BPM | RESPIRATION RATE: 18 BRPM | OXYGEN SATURATION: 97 % | TEMPERATURE: 99 F | BODY MASS INDEX: 31.5 KG/M2

## 2021-02-18 DIAGNOSIS — I69.154 HEMIPARESIS OF LEFT NONDOMINANT SIDE AS LATE EFFECT OF NONTRAUMATIC INTRACEREBRAL HEMORRHAGE: ICD-10-CM

## 2021-02-18 DIAGNOSIS — I10 ESSENTIAL HYPERTENSION: ICD-10-CM

## 2021-02-18 DIAGNOSIS — H25.9 SENILE CATARACT OF RIGHT EYE, UNSPECIFIED AGE-RELATED CATARACT TYPE: Primary | ICD-10-CM

## 2021-02-18 DIAGNOSIS — N40.0 BENIGN PROSTATIC HYPERPLASIA, UNSPECIFIED WHETHER LOWER URINARY TRACT SYMPTOMS PRESENT: ICD-10-CM

## 2021-02-18 PROCEDURE — 99213 PR OFFICE/OUTPT VISIT, EST, LEVL III, 20-29 MIN: ICD-10-PCS | Mod: S$GLB,,, | Performed by: INTERNAL MEDICINE

## 2021-02-18 PROCEDURE — 3074F PR MOST RECENT SYSTOLIC BLOOD PRESSURE < 130 MM HG: ICD-10-PCS | Mod: CPTII,S$GLB,, | Performed by: INTERNAL MEDICINE

## 2021-02-18 PROCEDURE — 99499 RISK ADDL DX/OHS AUDIT: ICD-10-PCS | Mod: S$PBB,,, | Performed by: INTERNAL MEDICINE

## 2021-02-18 PROCEDURE — 3288F FALL RISK ASSESSMENT DOCD: CPT | Mod: CPTII,S$GLB,, | Performed by: INTERNAL MEDICINE

## 2021-02-18 PROCEDURE — 3288F PR FALLS RISK ASSESSMENT DOCUMENTED: ICD-10-PCS | Mod: CPTII,S$GLB,, | Performed by: INTERNAL MEDICINE

## 2021-02-18 PROCEDURE — 99999 PR PBB SHADOW E&M-EST. PATIENT-LVL V: ICD-10-PCS | Mod: PBBFAC,,, | Performed by: INTERNAL MEDICINE

## 2021-02-18 PROCEDURE — 1101F PT FALLS ASSESS-DOCD LE1/YR: CPT | Mod: CPTII,S$GLB,, | Performed by: INTERNAL MEDICINE

## 2021-02-18 PROCEDURE — 99999 PR PBB SHADOW E&M-EST. PATIENT-LVL V: CPT | Mod: PBBFAC,,, | Performed by: INTERNAL MEDICINE

## 2021-02-18 PROCEDURE — 3074F SYST BP LT 130 MM HG: CPT | Mod: CPTII,S$GLB,, | Performed by: INTERNAL MEDICINE

## 2021-02-18 PROCEDURE — 99213 OFFICE O/P EST LOW 20 MIN: CPT | Mod: S$GLB,,, | Performed by: INTERNAL MEDICINE

## 2021-02-18 PROCEDURE — 1101F PR PT FALLS ASSESS DOC 0-1 FALLS W/OUT INJ PAST YR: ICD-10-PCS | Mod: CPTII,S$GLB,, | Performed by: INTERNAL MEDICINE

## 2021-02-18 PROCEDURE — 1159F PR MEDICATION LIST DOCUMENTED IN MEDICAL RECORD: ICD-10-PCS | Mod: S$GLB,,, | Performed by: INTERNAL MEDICINE

## 2021-02-18 PROCEDURE — 1159F MED LIST DOCD IN RCRD: CPT | Mod: S$GLB,,, | Performed by: INTERNAL MEDICINE

## 2021-02-18 PROCEDURE — 3078F PR MOST RECENT DIASTOLIC BLOOD PRESSURE < 80 MM HG: ICD-10-PCS | Mod: CPTII,S$GLB,, | Performed by: INTERNAL MEDICINE

## 2021-02-18 PROCEDURE — 1126F PR PAIN SEVERITY QUANTIFIED, NO PAIN PRESENT: ICD-10-PCS | Mod: S$GLB,,, | Performed by: INTERNAL MEDICINE

## 2021-02-18 PROCEDURE — 99499 UNLISTED E&M SERVICE: CPT | Mod: S$PBB,,, | Performed by: INTERNAL MEDICINE

## 2021-02-18 PROCEDURE — 3078F DIAST BP <80 MM HG: CPT | Mod: CPTII,S$GLB,, | Performed by: INTERNAL MEDICINE

## 2021-02-18 PROCEDURE — 1126F AMNT PAIN NOTED NONE PRSNT: CPT | Mod: S$GLB,,, | Performed by: INTERNAL MEDICINE

## 2021-02-18 RX ORDER — TAMSULOSIN HYDROCHLORIDE 0.4 MG/1
0.8 CAPSULE ORAL DAILY
Qty: 180 CAPSULE | Refills: 3 | Status: ON HOLD | OUTPATIENT
Start: 2021-02-18 | End: 2022-12-09 | Stop reason: SDUPTHER

## 2021-05-18 ENCOUNTER — OFFICE VISIT (OUTPATIENT)
Dept: PRIMARY CARE CLINIC | Facility: CLINIC | Age: 81
End: 2021-05-18
Payer: MEDICARE

## 2021-05-18 VITALS
OXYGEN SATURATION: 95 % | TEMPERATURE: 99 F | SYSTOLIC BLOOD PRESSURE: 116 MMHG | HEART RATE: 71 BPM | RESPIRATION RATE: 16 BRPM | HEIGHT: 65 IN | WEIGHT: 189.63 LBS | DIASTOLIC BLOOD PRESSURE: 66 MMHG | BODY MASS INDEX: 31.59 KG/M2

## 2021-05-18 DIAGNOSIS — N40.0 BENIGN PROSTATIC HYPERPLASIA WITHOUT LOWER URINARY TRACT SYMPTOMS: ICD-10-CM

## 2021-05-18 DIAGNOSIS — H60.63 CHRONIC OTITIS EXTERNA OF BOTH EARS, UNSPECIFIED TYPE: Primary | ICD-10-CM

## 2021-05-18 PROCEDURE — 1101F PR PT FALLS ASSESS DOC 0-1 FALLS W/OUT INJ PAST YR: ICD-10-PCS | Mod: CPTII,S$GLB,, | Performed by: INTERNAL MEDICINE

## 2021-05-18 PROCEDURE — 1159F PR MEDICATION LIST DOCUMENTED IN MEDICAL RECORD: ICD-10-PCS | Mod: S$GLB,,, | Performed by: INTERNAL MEDICINE

## 2021-05-18 PROCEDURE — 99213 OFFICE O/P EST LOW 20 MIN: CPT | Mod: S$GLB,,, | Performed by: INTERNAL MEDICINE

## 2021-05-18 PROCEDURE — 1101F PT FALLS ASSESS-DOCD LE1/YR: CPT | Mod: CPTII,S$GLB,, | Performed by: INTERNAL MEDICINE

## 2021-05-18 PROCEDURE — 1126F AMNT PAIN NOTED NONE PRSNT: CPT | Mod: S$GLB,,, | Performed by: INTERNAL MEDICINE

## 2021-05-18 PROCEDURE — 99213 PR OFFICE/OUTPT VISIT, EST, LEVL III, 20-29 MIN: ICD-10-PCS | Mod: S$GLB,,, | Performed by: INTERNAL MEDICINE

## 2021-05-18 PROCEDURE — 1159F MED LIST DOCD IN RCRD: CPT | Mod: S$GLB,,, | Performed by: INTERNAL MEDICINE

## 2021-05-18 PROCEDURE — 3288F FALL RISK ASSESSMENT DOCD: CPT | Mod: CPTII,S$GLB,, | Performed by: INTERNAL MEDICINE

## 2021-05-18 PROCEDURE — 99999 PR PBB SHADOW E&M-EST. PATIENT-LVL IV: CPT | Mod: PBBFAC,,, | Performed by: INTERNAL MEDICINE

## 2021-05-18 PROCEDURE — 3288F PR FALLS RISK ASSESSMENT DOCUMENTED: ICD-10-PCS | Mod: CPTII,S$GLB,, | Performed by: INTERNAL MEDICINE

## 2021-05-18 PROCEDURE — 99999 PR PBB SHADOW E&M-EST. PATIENT-LVL IV: ICD-10-PCS | Mod: PBBFAC,,, | Performed by: INTERNAL MEDICINE

## 2021-05-18 PROCEDURE — 1126F PR PAIN SEVERITY QUANTIFIED, NO PAIN PRESENT: ICD-10-PCS | Mod: S$GLB,,, | Performed by: INTERNAL MEDICINE

## 2021-05-18 RX ORDER — COLISTIN SULFATE, NEOMYCIN SULFATE, THONZONIUM BROMIDE AND HYDROCORTISONE ACETATE 3; 3.3; .5; 1 MG/ML; MG/ML; MG/ML; MG/ML
4 SUSPENSION AURICULAR (OTIC) 2 TIMES DAILY PRN
Qty: 10 ML | Refills: 1 | Status: SHIPPED | OUTPATIENT
Start: 2021-05-18 | End: 2021-10-19

## 2021-05-25 ENCOUNTER — PATIENT OUTREACH (OUTPATIENT)
Dept: ADMINISTRATIVE | Facility: OTHER | Age: 81
End: 2021-05-25

## 2021-06-18 ENCOUNTER — OFFICE VISIT (OUTPATIENT)
Dept: UROLOGY | Facility: CLINIC | Age: 81
End: 2021-06-18
Payer: MEDICARE

## 2021-06-18 VITALS
SYSTOLIC BLOOD PRESSURE: 130 MMHG | DIASTOLIC BLOOD PRESSURE: 74 MMHG | WEIGHT: 187.63 LBS | HEART RATE: 60 BPM | HEIGHT: 65 IN | BODY MASS INDEX: 31.26 KG/M2

## 2021-06-18 DIAGNOSIS — N40.0 BENIGN PROSTATIC HYPERPLASIA WITHOUT LOWER URINARY TRACT SYMPTOMS: ICD-10-CM

## 2021-06-18 PROCEDURE — 1126F AMNT PAIN NOTED NONE PRSNT: CPT | Mod: S$GLB,,, | Performed by: NURSE PRACTITIONER

## 2021-06-18 PROCEDURE — 99999 PR PBB SHADOW E&M-EST. PATIENT-LVL IV: CPT | Mod: PBBFAC,,, | Performed by: NURSE PRACTITIONER

## 2021-06-18 PROCEDURE — 1126F PR PAIN SEVERITY QUANTIFIED, NO PAIN PRESENT: ICD-10-PCS | Mod: S$GLB,,, | Performed by: NURSE PRACTITIONER

## 2021-06-18 PROCEDURE — 99999 PR PBB SHADOW E&M-EST. PATIENT-LVL IV: ICD-10-PCS | Mod: PBBFAC,,, | Performed by: NURSE PRACTITIONER

## 2021-06-18 PROCEDURE — 3288F PR FALLS RISK ASSESSMENT DOCUMENTED: ICD-10-PCS | Mod: CPTII,S$GLB,, | Performed by: NURSE PRACTITIONER

## 2021-06-18 PROCEDURE — 99214 PR OFFICE/OUTPT VISIT, EST, LEVL IV, 30-39 MIN: ICD-10-PCS | Mod: S$GLB,,, | Performed by: NURSE PRACTITIONER

## 2021-06-18 PROCEDURE — 99214 OFFICE O/P EST MOD 30 MIN: CPT | Mod: S$GLB,,, | Performed by: NURSE PRACTITIONER

## 2021-06-18 PROCEDURE — 1159F PR MEDICATION LIST DOCUMENTED IN MEDICAL RECORD: ICD-10-PCS | Mod: S$GLB,,, | Performed by: NURSE PRACTITIONER

## 2021-06-18 PROCEDURE — 1101F PR PT FALLS ASSESS DOC 0-1 FALLS W/OUT INJ PAST YR: ICD-10-PCS | Mod: CPTII,S$GLB,, | Performed by: NURSE PRACTITIONER

## 2021-06-18 PROCEDURE — 1159F MED LIST DOCD IN RCRD: CPT | Mod: S$GLB,,, | Performed by: NURSE PRACTITIONER

## 2021-06-18 PROCEDURE — 87086 URINE CULTURE/COLONY COUNT: CPT | Performed by: NURSE PRACTITIONER

## 2021-06-18 PROCEDURE — 3288F FALL RISK ASSESSMENT DOCD: CPT | Mod: CPTII,S$GLB,, | Performed by: NURSE PRACTITIONER

## 2021-06-18 PROCEDURE — 1101F PT FALLS ASSESS-DOCD LE1/YR: CPT | Mod: CPTII,S$GLB,, | Performed by: NURSE PRACTITIONER

## 2021-06-18 RX ORDER — FINASTERIDE 5 MG/1
5 TABLET, FILM COATED ORAL DAILY
Qty: 30 TABLET | Refills: 11 | Status: SHIPPED | OUTPATIENT
Start: 2021-06-18 | End: 2021-10-19

## 2021-06-18 RX ORDER — OXYBUTYNIN CHLORIDE 10 MG/1
10 TABLET, EXTENDED RELEASE ORAL DAILY
Qty: 30 TABLET | Refills: 11 | Status: SHIPPED | OUTPATIENT
Start: 2021-06-18 | End: 2021-07-19

## 2021-06-20 LAB — BACTERIA UR CULT: NO GROWTH

## 2021-07-19 ENCOUNTER — OFFICE VISIT (OUTPATIENT)
Dept: UROLOGY | Facility: CLINIC | Age: 81
End: 2021-07-19
Payer: MEDICARE

## 2021-07-19 VITALS
DIASTOLIC BLOOD PRESSURE: 82 MMHG | HEART RATE: 62 BPM | HEIGHT: 65 IN | BODY MASS INDEX: 31.2 KG/M2 | SYSTOLIC BLOOD PRESSURE: 139 MMHG | WEIGHT: 187.25 LBS

## 2021-07-19 DIAGNOSIS — R35.1 BENIGN PROSTATIC HYPERPLASIA WITH NOCTURIA: Primary | ICD-10-CM

## 2021-07-19 DIAGNOSIS — N40.1 BENIGN PROSTATIC HYPERPLASIA WITH NOCTURIA: Primary | ICD-10-CM

## 2021-07-19 PROCEDURE — 1101F PR PT FALLS ASSESS DOC 0-1 FALLS W/OUT INJ PAST YR: ICD-10-PCS | Mod: CPTII,S$GLB,, | Performed by: NURSE PRACTITIONER

## 2021-07-19 PROCEDURE — 99999 PR PBB SHADOW E&M-EST. PATIENT-LVL III: ICD-10-PCS | Mod: PBBFAC,,, | Performed by: NURSE PRACTITIONER

## 2021-07-19 PROCEDURE — 99999 PR PBB SHADOW E&M-EST. PATIENT-LVL III: CPT | Mod: PBBFAC,,, | Performed by: NURSE PRACTITIONER

## 2021-07-19 PROCEDURE — 99213 OFFICE O/P EST LOW 20 MIN: CPT | Mod: S$GLB,,, | Performed by: NURSE PRACTITIONER

## 2021-07-19 PROCEDURE — 1126F AMNT PAIN NOTED NONE PRSNT: CPT | Mod: CPTII,S$GLB,, | Performed by: NURSE PRACTITIONER

## 2021-07-19 PROCEDURE — 3288F PR FALLS RISK ASSESSMENT DOCUMENTED: ICD-10-PCS | Mod: CPTII,S$GLB,, | Performed by: NURSE PRACTITIONER

## 2021-07-19 PROCEDURE — 1101F PT FALLS ASSESS-DOCD LE1/YR: CPT | Mod: CPTII,S$GLB,, | Performed by: NURSE PRACTITIONER

## 2021-07-19 PROCEDURE — 99213 PR OFFICE/OUTPT VISIT, EST, LEVL III, 20-29 MIN: ICD-10-PCS | Mod: S$GLB,,, | Performed by: NURSE PRACTITIONER

## 2021-07-19 PROCEDURE — 3288F FALL RISK ASSESSMENT DOCD: CPT | Mod: CPTII,S$GLB,, | Performed by: NURSE PRACTITIONER

## 2021-07-19 PROCEDURE — 1126F PR PAIN SEVERITY QUANTIFIED, NO PAIN PRESENT: ICD-10-PCS | Mod: CPTII,S$GLB,, | Performed by: NURSE PRACTITIONER

## 2021-07-19 PROCEDURE — 1159F PR MEDICATION LIST DOCUMENTED IN MEDICAL RECORD: ICD-10-PCS | Mod: CPTII,S$GLB,, | Performed by: NURSE PRACTITIONER

## 2021-07-19 PROCEDURE — 1159F MED LIST DOCD IN RCRD: CPT | Mod: CPTII,S$GLB,, | Performed by: NURSE PRACTITIONER

## 2021-10-12 ENCOUNTER — PATIENT OUTREACH (OUTPATIENT)
Dept: ADMINISTRATIVE | Facility: OTHER | Age: 81
End: 2021-10-12

## 2021-10-19 ENCOUNTER — OFFICE VISIT (OUTPATIENT)
Dept: PRIMARY CARE CLINIC | Facility: CLINIC | Age: 81
End: 2021-10-19
Payer: MEDICARE

## 2021-10-19 ENCOUNTER — OFFICE VISIT (OUTPATIENT)
Dept: UROLOGY | Facility: CLINIC | Age: 81
End: 2021-10-19
Payer: MEDICARE

## 2021-10-19 VITALS
HEART RATE: 75 BPM | HEIGHT: 65 IN | OXYGEN SATURATION: 97 % | BODY MASS INDEX: 30.49 KG/M2 | WEIGHT: 183 LBS | DIASTOLIC BLOOD PRESSURE: 84 MMHG | SYSTOLIC BLOOD PRESSURE: 134 MMHG | RESPIRATION RATE: 18 BRPM | HEART RATE: 69 BPM | WEIGHT: 183 LBS | SYSTOLIC BLOOD PRESSURE: 137 MMHG | DIASTOLIC BLOOD PRESSURE: 89 MMHG | HEIGHT: 65 IN | BODY MASS INDEX: 30.49 KG/M2

## 2021-10-19 DIAGNOSIS — D64.9 ANEMIA, UNSPECIFIED TYPE: ICD-10-CM

## 2021-10-19 DIAGNOSIS — I10 ESSENTIAL HYPERTENSION: ICD-10-CM

## 2021-10-19 DIAGNOSIS — R35.1 BENIGN PROSTATIC HYPERPLASIA WITH NOCTURIA: ICD-10-CM

## 2021-10-19 DIAGNOSIS — D69.6 THROMBOCYTOPENIA: ICD-10-CM

## 2021-10-19 DIAGNOSIS — N32.81 OAB (OVERACTIVE BLADDER): Primary | ICD-10-CM

## 2021-10-19 DIAGNOSIS — Z23 NEED FOR VACCINATION: Primary | ICD-10-CM

## 2021-10-19 DIAGNOSIS — E03.9 HYPOTHYROIDISM, UNSPECIFIED TYPE: ICD-10-CM

## 2021-10-19 DIAGNOSIS — N40.1 BENIGN PROSTATIC HYPERPLASIA WITH NOCTURIA: ICD-10-CM

## 2021-10-19 PROCEDURE — 99214 PR OFFICE/OUTPT VISIT, EST, LEVL IV, 30-39 MIN: ICD-10-PCS | Mod: 25,S$GLB,, | Performed by: INTERNAL MEDICINE

## 2021-10-19 PROCEDURE — 99999 PR PBB SHADOW E&M-EST. PATIENT-LVL III: ICD-10-PCS | Mod: PBBFAC,,, | Performed by: INTERNAL MEDICINE

## 2021-10-19 PROCEDURE — 1126F PR PAIN SEVERITY QUANTIFIED, NO PAIN PRESENT: ICD-10-PCS | Mod: CPTII,S$GLB,, | Performed by: INTERNAL MEDICINE

## 2021-10-19 PROCEDURE — 3079F DIAST BP 80-89 MM HG: CPT | Mod: CPTII,S$GLB,, | Performed by: INTERNAL MEDICINE

## 2021-10-19 PROCEDURE — G0009 PNEUMOCOCCAL POLYSACCHARIDE VACCINE 23-VALENT =>2YO SQ IM: ICD-10-PCS | Mod: S$GLB,,, | Performed by: INTERNAL MEDICINE

## 2021-10-19 PROCEDURE — G0008 FLU VACCINE - QUADRIVALENT - ADJUVANTED: ICD-10-PCS | Mod: S$GLB,,, | Performed by: INTERNAL MEDICINE

## 2021-10-19 PROCEDURE — 1160F RVW MEDS BY RX/DR IN RCRD: CPT | Mod: CPTII,S$GLB,, | Performed by: INTERNAL MEDICINE

## 2021-10-19 PROCEDURE — 3075F SYST BP GE 130 - 139MM HG: CPT | Mod: CPTII,S$GLB,, | Performed by: INTERNAL MEDICINE

## 2021-10-19 PROCEDURE — 1159F PR MEDICATION LIST DOCUMENTED IN MEDICAL RECORD: ICD-10-PCS | Mod: CPTII,S$GLB,, | Performed by: INTERNAL MEDICINE

## 2021-10-19 PROCEDURE — 3079F PR MOST RECENT DIASTOLIC BLOOD PRESSURE 80-89 MM HG: ICD-10-PCS | Mod: CPTII,S$GLB,, | Performed by: INTERNAL MEDICINE

## 2021-10-19 PROCEDURE — 99999 PR PBB SHADOW E&M-EST. PATIENT-LVL III: CPT | Mod: PBBFAC,,, | Performed by: INTERNAL MEDICINE

## 2021-10-19 PROCEDURE — 1126F PR PAIN SEVERITY QUANTIFIED, NO PAIN PRESENT: ICD-10-PCS | Mod: CPTII,S$GLB,, | Performed by: NURSE PRACTITIONER

## 2021-10-19 PROCEDURE — G0008 ADMIN INFLUENZA VIRUS VAC: HCPCS | Mod: S$GLB,,, | Performed by: INTERNAL MEDICINE

## 2021-10-19 PROCEDURE — 90732 PNEUMOCOCCAL POLYSACCHARIDE VACCINE 23-VALENT =>2YO SQ IM: ICD-10-PCS | Mod: S$GLB,,, | Performed by: INTERNAL MEDICINE

## 2021-10-19 PROCEDURE — 3288F FALL RISK ASSESSMENT DOCD: CPT | Mod: CPTII,S$GLB,, | Performed by: NURSE PRACTITIONER

## 2021-10-19 PROCEDURE — 90732 PPSV23 VACC 2 YRS+ SUBQ/IM: CPT | Mod: S$GLB,,, | Performed by: INTERNAL MEDICINE

## 2021-10-19 PROCEDURE — 1160F PR REVIEW ALL MEDS BY PRESCRIBER/CLIN PHARMACIST DOCUMENTED: ICD-10-PCS | Mod: CPTII,S$GLB,, | Performed by: INTERNAL MEDICINE

## 2021-10-19 PROCEDURE — 3075F PR MOST RECENT SYSTOLIC BLOOD PRESS GE 130-139MM HG: ICD-10-PCS | Mod: CPTII,S$GLB,, | Performed by: INTERNAL MEDICINE

## 2021-10-19 PROCEDURE — 99214 OFFICE O/P EST MOD 30 MIN: CPT | Mod: 25,S$GLB,, | Performed by: INTERNAL MEDICINE

## 2021-10-19 PROCEDURE — 1101F PR PT FALLS ASSESS DOC 0-1 FALLS W/OUT INJ PAST YR: ICD-10-PCS | Mod: CPTII,S$GLB,, | Performed by: NURSE PRACTITIONER

## 2021-10-19 PROCEDURE — 90694 FLU VACCINE - QUADRIVALENT - ADJUVANTED: ICD-10-PCS | Mod: S$GLB,,, | Performed by: INTERNAL MEDICINE

## 2021-10-19 PROCEDURE — G0009 ADMIN PNEUMOCOCCAL VACCINE: HCPCS | Mod: S$GLB,,, | Performed by: INTERNAL MEDICINE

## 2021-10-19 PROCEDURE — 1101F PT FALLS ASSESS-DOCD LE1/YR: CPT | Mod: CPTII,S$GLB,, | Performed by: NURSE PRACTITIONER

## 2021-10-19 PROCEDURE — 3079F PR MOST RECENT DIASTOLIC BLOOD PRESSURE 80-89 MM HG: ICD-10-PCS | Mod: CPTII,S$GLB,, | Performed by: NURSE PRACTITIONER

## 2021-10-19 PROCEDURE — 3075F PR MOST RECENT SYSTOLIC BLOOD PRESS GE 130-139MM HG: ICD-10-PCS | Mod: CPTII,S$GLB,, | Performed by: NURSE PRACTITIONER

## 2021-10-19 PROCEDURE — 99213 PR OFFICE/OUTPT VISIT, EST, LEVL III, 20-29 MIN: ICD-10-PCS | Mod: S$GLB,,, | Performed by: NURSE PRACTITIONER

## 2021-10-19 PROCEDURE — 99213 OFFICE O/P EST LOW 20 MIN: CPT | Mod: S$GLB,,, | Performed by: NURSE PRACTITIONER

## 2021-10-19 PROCEDURE — 1159F MED LIST DOCD IN RCRD: CPT | Mod: CPTII,S$GLB,, | Performed by: INTERNAL MEDICINE

## 2021-10-19 PROCEDURE — 1126F AMNT PAIN NOTED NONE PRSNT: CPT | Mod: CPTII,S$GLB,, | Performed by: NURSE PRACTITIONER

## 2021-10-19 PROCEDURE — 3079F DIAST BP 80-89 MM HG: CPT | Mod: CPTII,S$GLB,, | Performed by: NURSE PRACTITIONER

## 2021-10-19 PROCEDURE — 99999 PR PBB SHADOW E&M-EST. PATIENT-LVL IV: CPT | Mod: PBBFAC,,, | Performed by: NURSE PRACTITIONER

## 2021-10-19 PROCEDURE — 3075F SYST BP GE 130 - 139MM HG: CPT | Mod: CPTII,S$GLB,, | Performed by: NURSE PRACTITIONER

## 2021-10-19 PROCEDURE — 3288F PR FALLS RISK ASSESSMENT DOCUMENTED: ICD-10-PCS | Mod: CPTII,S$GLB,, | Performed by: NURSE PRACTITIONER

## 2021-10-19 PROCEDURE — 90694 VACC AIIV4 NO PRSRV 0.5ML IM: CPT | Mod: S$GLB,,, | Performed by: INTERNAL MEDICINE

## 2021-10-19 PROCEDURE — 99999 PR PBB SHADOW E&M-EST. PATIENT-LVL IV: ICD-10-PCS | Mod: PBBFAC,,, | Performed by: NURSE PRACTITIONER

## 2021-10-19 PROCEDURE — 1126F AMNT PAIN NOTED NONE PRSNT: CPT | Mod: CPTII,S$GLB,, | Performed by: INTERNAL MEDICINE

## 2021-10-19 RX ORDER — LEVOTHYROXINE SODIUM 200 UG/1
200 TABLET ORAL DAILY
Qty: 90 TABLET | Refills: 5 | Status: SHIPPED | OUTPATIENT
Start: 2021-10-19 | End: 2021-12-08 | Stop reason: SDUPTHER

## 2021-10-19 RX ORDER — OXYBUTYNIN CHLORIDE 10 MG/1
10 TABLET, EXTENDED RELEASE ORAL DAILY
COMMUNITY
Start: 2021-08-17 | End: 2021-10-19

## 2021-10-19 RX ORDER — METOPROLOL SUCCINATE 100 MG/1
100 TABLET, EXTENDED RELEASE ORAL DAILY
Qty: 90 TABLET | Refills: 5 | Status: SHIPPED | OUTPATIENT
Start: 2021-10-19 | End: 2021-12-08 | Stop reason: SDUPTHER

## 2021-10-19 RX ORDER — FERROUS SULFATE 325(65) MG
1 TABLET ORAL 2 TIMES DAILY
Qty: 90 TABLET | Refills: 5 | Status: SHIPPED | OUTPATIENT
Start: 2021-10-19 | End: 2021-12-08 | Stop reason: SDUPTHER

## 2021-10-19 RX ORDER — SOLIFENACIN SUCCINATE 10 MG/1
10 TABLET, FILM COATED ORAL DAILY
Qty: 30 TABLET | Refills: 11 | Status: SHIPPED | OUTPATIENT
Start: 2021-10-19 | End: 2021-10-19

## 2021-12-08 ENCOUNTER — TELEPHONE (OUTPATIENT)
Dept: PRIMARY CARE CLINIC | Facility: CLINIC | Age: 81
End: 2021-12-08
Payer: MEDICARE

## 2021-12-08 ENCOUNTER — PATIENT OUTREACH (OUTPATIENT)
Dept: ADMINISTRATIVE | Facility: OTHER | Age: 81
End: 2021-12-08
Payer: MEDICARE

## 2021-12-08 ENCOUNTER — OFFICE VISIT (OUTPATIENT)
Dept: PRIMARY CARE CLINIC | Facility: CLINIC | Age: 81
End: 2021-12-08
Payer: MEDICARE

## 2021-12-08 VITALS
BODY MASS INDEX: 29.75 KG/M2 | HEIGHT: 65 IN | WEIGHT: 178.56 LBS | OXYGEN SATURATION: 96 % | SYSTOLIC BLOOD PRESSURE: 126 MMHG | DIASTOLIC BLOOD PRESSURE: 74 MMHG | RESPIRATION RATE: 16 BRPM | HEART RATE: 98 BPM

## 2021-12-08 DIAGNOSIS — R55 SYNCOPE AND COLLAPSE: ICD-10-CM

## 2021-12-08 DIAGNOSIS — I10 ESSENTIAL HYPERTENSION: ICD-10-CM

## 2021-12-08 DIAGNOSIS — M54.50 ACUTE MIDLINE LOW BACK PAIN WITHOUT SCIATICA: Primary | ICD-10-CM

## 2021-12-08 DIAGNOSIS — D64.9 ANEMIA, UNSPECIFIED TYPE: ICD-10-CM

## 2021-12-08 DIAGNOSIS — E78.2 MIXED HYPERLIPIDEMIA: ICD-10-CM

## 2021-12-08 DIAGNOSIS — E03.9 HYPOTHYROIDISM, UNSPECIFIED TYPE: ICD-10-CM

## 2021-12-08 PROCEDURE — 96372 THER/PROPH/DIAG INJ SC/IM: CPT | Mod: S$GLB,,, | Performed by: INTERNAL MEDICINE

## 2021-12-08 PROCEDURE — 99214 PR OFFICE/OUTPT VISIT, EST, LEVL IV, 30-39 MIN: ICD-10-PCS | Mod: 25,S$GLB,, | Performed by: INTERNAL MEDICINE

## 2021-12-08 PROCEDURE — 99999 PR PBB SHADOW E&M-EST. PATIENT-LVL III: CPT | Mod: PBBFAC,,, | Performed by: INTERNAL MEDICINE

## 2021-12-08 PROCEDURE — 99999 PR PBB SHADOW E&M-EST. PATIENT-LVL III: ICD-10-PCS | Mod: PBBFAC,,, | Performed by: INTERNAL MEDICINE

## 2021-12-08 PROCEDURE — 99214 OFFICE O/P EST MOD 30 MIN: CPT | Mod: 25,S$GLB,, | Performed by: INTERNAL MEDICINE

## 2021-12-08 PROCEDURE — 96372 PR INJECTION,THERAP/PROPH/DIAG2ST, IM OR SUBCUT: ICD-10-PCS | Mod: S$GLB,,, | Performed by: INTERNAL MEDICINE

## 2021-12-08 RX ORDER — ATORVASTATIN CALCIUM 80 MG/1
80 TABLET, FILM COATED ORAL DAILY
COMMUNITY
Start: 2021-11-26 | End: 2021-12-08 | Stop reason: SDUPTHER

## 2021-12-08 RX ORDER — ATORVASTATIN CALCIUM 80 MG/1
80 TABLET, FILM COATED ORAL DAILY
Qty: 90 TABLET | Refills: 3 | Status: ON HOLD | OUTPATIENT
Start: 2021-12-08 | End: 2022-12-09 | Stop reason: SDUPTHER

## 2021-12-08 RX ORDER — FERROUS SULFATE 325(65) MG
325 TABLET ORAL 2 TIMES DAILY
Qty: 60 TABLET | Refills: 3 | Status: SHIPPED | OUTPATIENT
Start: 2021-12-08 | End: 2023-02-28 | Stop reason: SDUPTHER

## 2021-12-08 RX ORDER — ASPIRIN 81 MG/1
81 TABLET ORAL DAILY
Qty: 90 TABLET | Refills: 3 | Status: ON HOLD | OUTPATIENT
Start: 2021-12-08 | End: 2022-12-09 | Stop reason: SDUPTHER

## 2021-12-08 RX ORDER — KETOROLAC TROMETHAMINE 30 MG/ML
30 INJECTION, SOLUTION INTRAMUSCULAR; INTRAVENOUS ONCE
Status: COMPLETED | OUTPATIENT
Start: 2021-12-08 | End: 2021-12-08

## 2021-12-08 RX ORDER — LISINOPRIL 10 MG/1
10 TABLET ORAL DAILY
Qty: 90 TABLET | Refills: 3 | Status: ON HOLD | OUTPATIENT
Start: 2021-12-08 | End: 2022-12-09 | Stop reason: SDUPTHER

## 2021-12-08 RX ORDER — PREDNISONE 20 MG/1
20 TABLET ORAL 2 TIMES DAILY
Qty: 10 TABLET | Refills: 0 | Status: SHIPPED | OUTPATIENT
Start: 2021-12-08 | End: 2021-12-13

## 2021-12-08 RX ORDER — LEVOTHYROXINE SODIUM 200 UG/1
200 TABLET ORAL DAILY
Qty: 90 TABLET | Refills: 5 | Status: SHIPPED | OUTPATIENT
Start: 2021-12-08 | End: 2022-02-10

## 2021-12-08 RX ORDER — METOPROLOL SUCCINATE 100 MG/1
100 TABLET, EXTENDED RELEASE ORAL DAILY
Qty: 90 TABLET | Refills: 5 | Status: ON HOLD | OUTPATIENT
Start: 2021-12-08 | End: 2022-12-09 | Stop reason: SDUPTHER

## 2021-12-08 RX ORDER — AMOXICILLIN 500 MG
1 CAPSULE ORAL 2 TIMES DAILY
Qty: 60 CAPSULE | Refills: 5 | Status: SHIPPED | OUTPATIENT
Start: 2021-12-08 | End: 2023-03-03

## 2021-12-08 RX ORDER — LISINOPRIL 10 MG/1
10 TABLET ORAL DAILY
COMMUNITY
Start: 2021-11-26 | End: 2021-12-08 | Stop reason: SDUPTHER

## 2021-12-08 RX ADMIN — KETOROLAC TROMETHAMINE 30 MG: 30 INJECTION, SOLUTION INTRAMUSCULAR; INTRAVENOUS at 03:12

## 2021-12-09 DIAGNOSIS — S32.040A COMPRESSION FRACTURE OF L4 VERTEBRA, INITIAL ENCOUNTER: Primary | ICD-10-CM

## 2021-12-14 ENCOUNTER — OFFICE VISIT (OUTPATIENT)
Dept: UROLOGY | Facility: CLINIC | Age: 81
End: 2021-12-14
Payer: MEDICARE

## 2021-12-14 VITALS
HEART RATE: 67 BPM | BODY MASS INDEX: 29.9 KG/M2 | SYSTOLIC BLOOD PRESSURE: 125 MMHG | WEIGHT: 179.44 LBS | DIASTOLIC BLOOD PRESSURE: 86 MMHG | HEIGHT: 65 IN

## 2021-12-14 DIAGNOSIS — N40.1 BENIGN PROSTATIC HYPERPLASIA WITH URINARY FREQUENCY: ICD-10-CM

## 2021-12-14 DIAGNOSIS — N32.81 OAB (OVERACTIVE BLADDER): Primary | ICD-10-CM

## 2021-12-14 DIAGNOSIS — R35.0 BENIGN PROSTATIC HYPERPLASIA WITH URINARY FREQUENCY: ICD-10-CM

## 2021-12-14 PROCEDURE — 99213 PR OFFICE/OUTPT VISIT, EST, LEVL III, 20-29 MIN: ICD-10-PCS | Mod: S$GLB,,, | Performed by: NURSE PRACTITIONER

## 2021-12-14 PROCEDURE — 99999 PR PBB SHADOW E&M-EST. PATIENT-LVL IV: CPT | Mod: PBBFAC,,, | Performed by: NURSE PRACTITIONER

## 2021-12-14 PROCEDURE — 99999 PR PBB SHADOW E&M-EST. PATIENT-LVL IV: ICD-10-PCS | Mod: PBBFAC,,, | Performed by: NURSE PRACTITIONER

## 2021-12-14 PROCEDURE — 99213 OFFICE O/P EST LOW 20 MIN: CPT | Mod: S$GLB,,, | Performed by: NURSE PRACTITIONER

## 2021-12-14 RX ORDER — OXYBUTYNIN CHLORIDE 10 MG/1
10 TABLET, EXTENDED RELEASE ORAL DAILY
Qty: 30 TABLET | Refills: 11 | Status: SHIPPED | OUTPATIENT
Start: 2021-12-14 | End: 2022-12-20

## 2021-12-16 ENCOUNTER — TELEPHONE (OUTPATIENT)
Dept: PRIMARY CARE CLINIC | Facility: CLINIC | Age: 81
End: 2021-12-16
Payer: MEDICARE

## 2021-12-17 ENCOUNTER — TELEPHONE (OUTPATIENT)
Dept: PRIMARY CARE CLINIC | Facility: CLINIC | Age: 81
End: 2021-12-17
Payer: MEDICARE

## 2021-12-22 DIAGNOSIS — M48.061 SPINAL STENOSIS OF LUMBAR REGION, UNSPECIFIED WHETHER NEUROGENIC CLAUDICATION PRESENT: ICD-10-CM

## 2021-12-22 DIAGNOSIS — S32.040A COMPRESSION FRACTURE OF L4 VERTEBRA, INITIAL ENCOUNTER: Primary | ICD-10-CM

## 2021-12-29 ENCOUNTER — TELEPHONE (OUTPATIENT)
Dept: PRIMARY CARE CLINIC | Facility: CLINIC | Age: 81
End: 2021-12-29
Payer: MEDICARE

## 2022-01-14 ENCOUNTER — TELEPHONE (OUTPATIENT)
Dept: UROLOGY | Facility: CLINIC | Age: 82
End: 2022-01-14
Payer: MEDICARE

## 2022-01-14 ENCOUNTER — OFFICE VISIT (OUTPATIENT)
Dept: UROLOGY | Facility: CLINIC | Age: 82
End: 2022-01-14
Payer: MEDICARE

## 2022-01-14 DIAGNOSIS — N40.0 BENIGN PROSTATIC HYPERPLASIA WITHOUT LOWER URINARY TRACT SYMPTOMS: Primary | ICD-10-CM

## 2022-01-14 DIAGNOSIS — N32.81 OAB (OVERACTIVE BLADDER): ICD-10-CM

## 2022-01-14 PROCEDURE — 99499 UNLISTED E&M SERVICE: CPT | Mod: 95,,, | Performed by: NURSE PRACTITIONER

## 2022-01-14 PROCEDURE — 99499 NO LOS: ICD-10-PCS | Mod: 95,,, | Performed by: NURSE PRACTITIONER

## 2022-01-25 ENCOUNTER — TELEPHONE (OUTPATIENT)
Dept: PRIMARY CARE CLINIC | Facility: CLINIC | Age: 82
End: 2022-01-25
Payer: MEDICARE

## 2022-01-25 NOTE — TELEPHONE ENCOUNTER
----- Message from Hever Carvalho MD sent at 12/22/2021  3:29 AM CST -----  Please call the patient regarding his abnormal result. LS ct scan significant canal stenoses L2-L5 need appt f/u with Dr Kearns

## 2022-01-25 NOTE — TELEPHONE ENCOUNTER
I left the patient a message to call pain management for his back, Dr. Kearns at [phone] or he can schedule with him while he's here next month

## 2022-02-09 ENCOUNTER — OFFICE VISIT (OUTPATIENT)
Dept: PRIMARY CARE CLINIC | Facility: CLINIC | Age: 82
End: 2022-02-09
Payer: MEDICARE

## 2022-02-09 VITALS
BODY MASS INDEX: 30.64 KG/M2 | WEIGHT: 183.88 LBS | DIASTOLIC BLOOD PRESSURE: 68 MMHG | HEIGHT: 65 IN | SYSTOLIC BLOOD PRESSURE: 124 MMHG | HEART RATE: 103 BPM | RESPIRATION RATE: 18 BRPM | OXYGEN SATURATION: 99 %

## 2022-02-09 DIAGNOSIS — I10 ESSENTIAL HYPERTENSION: ICD-10-CM

## 2022-02-09 DIAGNOSIS — D64.9 ANEMIA, UNSPECIFIED TYPE: ICD-10-CM

## 2022-02-09 DIAGNOSIS — R53.83 FATIGUE, UNSPECIFIED TYPE: ICD-10-CM

## 2022-02-09 DIAGNOSIS — R51.9 NONINTRACTABLE HEADACHE, UNSPECIFIED CHRONICITY PATTERN, UNSPECIFIED HEADACHE TYPE: Primary | ICD-10-CM

## 2022-02-09 DIAGNOSIS — I69.30 HISTORY OF HEMORRHAGIC CEREBROVASCULAR ACCIDENT (CVA) WITH RESIDUAL DEFICIT: ICD-10-CM

## 2022-02-09 DIAGNOSIS — E03.9 HYPOTHYROIDISM, UNSPECIFIED TYPE: ICD-10-CM

## 2022-02-09 PROCEDURE — 1160F PR REVIEW ALL MEDS BY PRESCRIBER/CLIN PHARMACIST DOCUMENTED: ICD-10-PCS | Mod: CPTII,S$GLB,, | Performed by: INTERNAL MEDICINE

## 2022-02-09 PROCEDURE — 1100F PR PT FALLS ASSESS DOC 2+ FALLS/FALL W/INJURY/YR: ICD-10-PCS | Mod: CPTII,S$GLB,, | Performed by: INTERNAL MEDICINE

## 2022-02-09 PROCEDURE — 99999 PR PBB SHADOW E&M-EST. PATIENT-LVL IV: CPT | Mod: PBBFAC,,, | Performed by: INTERNAL MEDICINE

## 2022-02-09 PROCEDURE — 1126F PR PAIN SEVERITY QUANTIFIED, NO PAIN PRESENT: ICD-10-PCS | Mod: CPTII,S$GLB,, | Performed by: INTERNAL MEDICINE

## 2022-02-09 PROCEDURE — 96372 THER/PROPH/DIAG INJ SC/IM: CPT | Mod: S$GLB,,, | Performed by: INTERNAL MEDICINE

## 2022-02-09 PROCEDURE — 1100F PTFALLS ASSESS-DOCD GE2>/YR: CPT | Mod: CPTII,S$GLB,, | Performed by: INTERNAL MEDICINE

## 2022-02-09 PROCEDURE — 1126F AMNT PAIN NOTED NONE PRSNT: CPT | Mod: CPTII,S$GLB,, | Performed by: INTERNAL MEDICINE

## 2022-02-09 PROCEDURE — 3074F PR MOST RECENT SYSTOLIC BLOOD PRESSURE < 130 MM HG: ICD-10-PCS | Mod: CPTII,S$GLB,, | Performed by: INTERNAL MEDICINE

## 2022-02-09 PROCEDURE — 1159F MED LIST DOCD IN RCRD: CPT | Mod: CPTII,S$GLB,, | Performed by: INTERNAL MEDICINE

## 2022-02-09 PROCEDURE — 3074F SYST BP LT 130 MM HG: CPT | Mod: CPTII,S$GLB,, | Performed by: INTERNAL MEDICINE

## 2022-02-09 PROCEDURE — 1159F PR MEDICATION LIST DOCUMENTED IN MEDICAL RECORD: ICD-10-PCS | Mod: CPTII,S$GLB,, | Performed by: INTERNAL MEDICINE

## 2022-02-09 PROCEDURE — 3078F PR MOST RECENT DIASTOLIC BLOOD PRESSURE < 80 MM HG: ICD-10-PCS | Mod: CPTII,S$GLB,, | Performed by: INTERNAL MEDICINE

## 2022-02-09 PROCEDURE — 1160F RVW MEDS BY RX/DR IN RCRD: CPT | Mod: CPTII,S$GLB,, | Performed by: INTERNAL MEDICINE

## 2022-02-09 PROCEDURE — 99499 RISK ADDL DX/OHS AUDIT: ICD-10-PCS | Mod: S$GLB,,, | Performed by: INTERNAL MEDICINE

## 2022-02-09 PROCEDURE — 3078F DIAST BP <80 MM HG: CPT | Mod: CPTII,S$GLB,, | Performed by: INTERNAL MEDICINE

## 2022-02-09 PROCEDURE — 96372 PR INJECTION,THERAP/PROPH/DIAG2ST, IM OR SUBCUT: ICD-10-PCS | Mod: S$GLB,,, | Performed by: INTERNAL MEDICINE

## 2022-02-09 PROCEDURE — 99214 OFFICE O/P EST MOD 30 MIN: CPT | Mod: 25,S$GLB,, | Performed by: INTERNAL MEDICINE

## 2022-02-09 PROCEDURE — 99999 PR PBB SHADOW E&M-EST. PATIENT-LVL IV: ICD-10-PCS | Mod: PBBFAC,,, | Performed by: INTERNAL MEDICINE

## 2022-02-09 PROCEDURE — 99214 PR OFFICE/OUTPT VISIT, EST, LEVL IV, 30-39 MIN: ICD-10-PCS | Mod: 25,S$GLB,, | Performed by: INTERNAL MEDICINE

## 2022-02-09 PROCEDURE — 3288F FALL RISK ASSESSMENT DOCD: CPT | Mod: CPTII,S$GLB,, | Performed by: INTERNAL MEDICINE

## 2022-02-09 PROCEDURE — 99499 UNLISTED E&M SERVICE: CPT | Mod: S$GLB,,, | Performed by: INTERNAL MEDICINE

## 2022-02-09 PROCEDURE — 3288F PR FALLS RISK ASSESSMENT DOCUMENTED: ICD-10-PCS | Mod: CPTII,S$GLB,, | Performed by: INTERNAL MEDICINE

## 2022-02-09 RX ORDER — BUTALBITAL, ACETAMINOPHEN AND CAFFEINE 50; 325; 40 MG/1; MG/1; MG/1
1 TABLET ORAL 3 TIMES DAILY PRN
Qty: 30 TABLET | Refills: 0 | Status: SHIPPED | OUTPATIENT
Start: 2022-02-09 | End: 2022-03-11

## 2022-02-09 RX ORDER — CYANOCOBALAMIN 1000 UG/ML
2000 INJECTION, SOLUTION INTRAMUSCULAR; SUBCUTANEOUS
Status: COMPLETED | OUTPATIENT
Start: 2022-02-09 | End: 2022-02-09

## 2022-02-09 RX ADMIN — CYANOCOBALAMIN 2000 MCG: 1000 INJECTION, SOLUTION INTRAMUSCULAR; SUBCUTANEOUS at 04:02

## 2022-02-09 NOTE — PROGRESS NOTES
Subjective:       Patient ID: Hever Reyes Jr. is a 81 y.o. male.    Chief Complaint: Follow-up and Dizziness    HPI  Pt with h/o intracerebral hemorrhage was in hospital fr several weeks and rehab pt currently live with his son and ambulating independently with cane no fall pt states has been having headach frontal x 4 weeks not better with tylenol but getting worse no n/v fever chill pt [place] [facility] states he had echo stress test blood test normal RTC in 1 yr he c/o tired fatigue denies sob cp SANTOS no edema no bleeding his HA not better with tylenol  Review of Systems    Objective:      Physical Exam  Vitals and nursing note reviewed.   Constitutional:       General: He is not in acute distress.     Appearance: He is well-developed and well-nourished.   HENT:      Head: Normocephalic and atraumatic.      Comments: Frontal bitemporal HA      Right Ear: External ear normal.      Left Ear: External ear normal.      Nose: Nose normal.      Mouth/Throat:      Mouth: Oropharynx is clear and moist.      Pharynx: No oropharyngeal exudate.   Eyes:      Extraocular Movements: Extraocular movements intact and EOM normal.      Conjunctiva/sclera: Conjunctivae normal.      Pupils: Pupils are equal, round, and reactive to light.   Neck:      Thyroid: No thyromegaly.   Cardiovascular:      Rate and Rhythm: Normal rate and regular rhythm.      Pulses: Intact distal pulses.      Heart sounds: Normal heart sounds. No murmur heard.  No friction rub. No gallop.    Pulmonary:      Effort: Pulmonary effort is normal. No respiratory distress.      Breath sounds: Normal breath sounds. No wheezing or rales.   Abdominal:      General: Bowel sounds are normal. There is no distension.      Palpations: Abdomen is soft.      Tenderness: There is no abdominal tenderness. There is no guarding.   Musculoskeletal:         General: No tenderness, deformity or edema. Normal range of motion.      Cervical back: Normal range  of motion and neck supple.   Lymphadenopathy:      Cervical: No cervical adenopathy.   Skin:     General: Skin is warm and dry.      Findings: No erythema or rash.   Neurological:      General: No focal deficit present.      Mental Status: He is alert and oriented to person, place, and time.      Comments: Generalized weakness    Psychiatric:         Mood and Affect: Mood and affect normal.         Thought Content: Thought content normal.         Judgment: Judgment normal.         Assessment:       1. Nonintractable headache, unspecified chronicity pattern, unspecified headache type    2. Anemia, unspecified type    3. Hypothyroidism, unspecified type    4. Essential hypertension    5. 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 Fatigue, unspecified type        Plan:       Nonintractable headache, unspecified chronicity pattern, unspecified headache type  Comments:  will treat symptoms since CT head no new bleed   Orders:  -     CT Head Without Contrast; Future; Expected date: 02/09/2022  -     butalbital-acetaminophen-caffeine -40 mg (FIORICET, ESGIC) -40 mg per tablet; Take 1 tablet by mouth 3 (three) times daily as needed for Headaches.  Dispense: 30 tablet; Refill: 0    Anemia, unspecified type  -     CBC Auto Differential; Future; Expected date: 02/09/2022  -     Comprehensive Metabolic Panel; Future; Expected date: 02/09/2022    Hypothyroidism, unspecified type  -     TSH; Future; Expected date: 02/09/2022  -     T4, Free; Future; Expected date: 02/09/2022    Essential hypertension  -     Urinalysis; Future; Expected date: 02/09/2022  -     X-Ray Chest PA And Lateral; Future; Expected date: 02/09/2022    History of hemorrhagic cerebrovascular accident (CVA) with residual deficit  -     CT Head Without Contrast; Future; Expected date: 02/09/2022    Fatigue, unspecified type  -     cyanocobalamin injection 2,000 mcg        Medication List with Changes/Refills    New Medications    BUTALBITAL-ACETAMINOPHEN-CAFFEINE -40 MG (FIORICET, ESGIC) -40 MG PER TABLET    Take 1 tablet by mouth 3 (three) times daily as needed for Headaches.   Current Medications    ASPIRIN (ECOTRIN) 81 MG EC TABLET    Take 1 tablet (81 mg total) by mouth once daily.    ATORVASTATIN (LIPITOR) 80 MG TABLET    Take 1 tablet (80 mg total) by mouth once daily.    FERROUS SULFATE (FEOSOL) 325 MG (65 MG IRON) TAB TABLET    Take 1 tablet (325 mg total) by mouth 2 (two) times daily.    LEVOTHYROXINE (SYNTHROID) 200 MCG TABLET    Take 1 tablet (200 mcg total) by mouth once daily.    LISINOPRIL 10 MG TABLET    Take 1 tablet (10 mg total) by mouth once daily.    METOPROLOL SUCCINATE (TOPROL-XL) 100 MG 24 HR TABLET    Take 1 tablet (100 mg total) by mouth once daily.    MULTIVITAMIN CAPSULE    Take 1 capsule by mouth once daily.    OMEGA-3 FATTY ACIDS/FISH OIL (FISH OIL-OMEGA-3 FATTY ACIDS) 300-1,000 MG CAPSULE    Take 1 capsule by mouth 2 (two) times daily.    OXYBUTYNIN (DITROPAN-XL) 10 MG 24 HR TABLET    Take 1 tablet (10 mg total) by mouth once daily.    TAMSULOSIN (FLOMAX) 0.4 MG CAP    Take 2 capsules (0.8 mg total) by mouth once daily.

## 2022-02-09 NOTE — PROGRESS NOTES
Verified pt ID using name and . NKDA. Administered B-12 2,000 mcg IM in Right VG per physician order using aseptic technique. Aspirated and no blood return noted. Pt tolerated well with no adverse reactions noted.

## 2022-02-10 DIAGNOSIS — E03.9 HYPOTHYROIDISM, UNSPECIFIED TYPE: Primary | ICD-10-CM

## 2022-02-10 RX ORDER — LEVOTHYROXINE SODIUM 125 UG/1
125 TABLET ORAL
Qty: 30 TABLET | Refills: 11 | Status: ON HOLD | OUTPATIENT
Start: 2022-02-10 | End: 2022-12-09 | Stop reason: SDUPTHER

## 2022-02-10 NOTE — PROGRESS NOTES
Patient, Hever Reyes Jr. (MRN #[id number]), presented with a recent Platelet count less than 150 K/uL consistent with the definition of thrombocytopenia (ICD10 - D69.6).    Platelets   Date Value Ref Range Status   02/09/2022 133 (L) 150 - 450 K/uL Final     The patient's thrombocytopenia was monitored, evaluated, addressed and/or treated. This addendum to the medical record is made on 02/10/2022.

## 2022-03-10 ENCOUNTER — TELEPHONE (OUTPATIENT)
Dept: PRIMARY CARE CLINIC | Facility: CLINIC | Age: 82
End: 2022-03-10
Payer: MEDICARE

## 2022-03-14 ENCOUNTER — TELEPHONE (OUTPATIENT)
Dept: PRIMARY CARE CLINIC | Facility: CLINIC | Age: 82
End: 2022-03-14
Payer: MEDICARE

## 2022-03-14 NOTE — TELEPHONE ENCOUNTER
----- Message from Sara uGallpa sent at 3/14/2022 10:36 AM CDT -----  Contact: Pt [phone]  Patient is returning a phone call.  Who left a message for the patient: Skyla Lepe LPN   Does patient know what this is regarding:  no  Would you like a call back, or a response through your [facility] portal?:   Call back  Comments:     Please call and advise.    Thank You

## 2022-08-15 NOTE — ED NOTES
Patient needs an EKG tomorrow 8/16. She is scheduled for a cardioversion on 8/17. She thinks she is in normal rhythm right now and they want to verify. Please advise.    Diya Shen on 8/15/2022 at 4:13 PM     Pt resting comfortably and independently repositioned in stretcher with bed locked in lowest position for safety. NAD noted at this time. Respirations even and unlabored and visible chest rise noted.  Patient offered bathroom assistance and denies need at this time. Condom cath present to bedside drainage. Pt instructed to call if assistance is needed. Pt on continuous cardiac, BP, and O2 monitoring. Call light within reach. IV dressing on left FA soiled with blood; arm cleaned and new dressing applied. No needs at this time. Will continue to monitor.

## 2022-11-21 ENCOUNTER — TELEPHONE (OUTPATIENT)
Dept: PRIMARY CARE CLINIC | Facility: CLINIC | Age: 82
End: 2022-11-21
Payer: MEDICARE

## 2022-11-21 NOTE — TELEPHONE ENCOUNTER
----- Message from Marjorie Knott sent at 11/21/2022  8:14 AM CST -----  Contact: jeane (daughter)[phone]  Pt requesting for appt state having bad headaches but do not want to see any other doctor.    Please call and advise

## 2022-12-06 ENCOUNTER — TELEPHONE (OUTPATIENT)
Dept: PRIMARY CARE CLINIC | Facility: CLINIC | Age: 82
End: 2022-12-06
Payer: MEDICARE

## 2022-12-06 NOTE — TELEPHONE ENCOUNTER
----- Message from Payal Oliveira sent at 12/6/2022  3:17 PM CST -----  Contact: Daughter, Fouzia, [phone]  Calling to schedule an appointment, he missed his last Friday, for headaches. Please call her. Thanks.

## 2022-12-07 ENCOUNTER — OFFICE VISIT (OUTPATIENT)
Dept: PRIMARY CARE CLINIC | Facility: CLINIC | Age: 82
End: 2022-12-07
Payer: MEDICARE

## 2022-12-07 DIAGNOSIS — R53.1 GENERALIZED WEAKNESS: ICD-10-CM

## 2022-12-07 DIAGNOSIS — S06.349D TRAUMATIC HEMORRHAGE OF RIGHT CEREBRUM WITH LOSS OF CONSCIOUSNESS, SUBSEQUENT ENCOUNTER: ICD-10-CM

## 2022-12-07 DIAGNOSIS — R29.6 MULTIPLE FALLS: ICD-10-CM

## 2022-12-07 DIAGNOSIS — E86.0 DEHYDRATION: ICD-10-CM

## 2022-12-07 DIAGNOSIS — D69.6 THROMBOCYTOPENIA: ICD-10-CM

## 2022-12-07 DIAGNOSIS — K52.9 GASTROENTERITIS: Primary | ICD-10-CM

## 2022-12-07 PROCEDURE — 1160F PR REVIEW ALL MEDS BY PRESCRIBER/CLIN PHARMACIST DOCUMENTED: ICD-10-PCS | Mod: CPTII,95,, | Performed by: INTERNAL MEDICINE

## 2022-12-07 PROCEDURE — 99443 PR PHYSICIAN TELEPHONE EVALUATION 21-30 MIN: ICD-10-PCS | Mod: 95,,, | Performed by: INTERNAL MEDICINE

## 2022-12-07 PROCEDURE — 1160F RVW MEDS BY RX/DR IN RCRD: CPT | Mod: CPTII,95,, | Performed by: INTERNAL MEDICINE

## 2022-12-07 PROCEDURE — 99443 PR PHYSICIAN TELEPHONE EVALUATION 21-30 MIN: CPT | Mod: 95,,, | Performed by: INTERNAL MEDICINE

## 2022-12-07 PROCEDURE — 1159F MED LIST DOCD IN RCRD: CPT | Mod: CPTII,95,, | Performed by: INTERNAL MEDICINE

## 2022-12-07 PROCEDURE — 1159F PR MEDICATION LIST DOCUMENTED IN MEDICAL RECORD: ICD-10-PCS | Mod: CPTII,95,, | Performed by: INTERNAL MEDICINE

## 2022-12-08 PROBLEM — R29.90 STROKE-LIKE SYMPTOMS: Status: ACTIVE | Noted: 2022-12-08

## 2022-12-08 PROBLEM — R13.19 OTHER DYSPHAGIA: Status: ACTIVE | Noted: 2022-12-08

## 2022-12-08 PROBLEM — Z91.199 NON COMPLIANCE WITH MEDICAL TREATMENT: Status: ACTIVE | Noted: 2022-12-08

## 2022-12-08 NOTE — PROGRESS NOTES
Subjective:    The patient location is: home  The chief complaint leading to consultation is: wt loss weakness multiple falls    Visit type: audio only    Face to Face time with patient: 22   minutes of total time spent on the encounter, which includes face to face time and non-face to face time preparing to see the patient (eg, review of tests), Obtaining and/or reviewing separately obtained history, Documenting clinical information in the electronic or other health record, Independently interpreting results (not separately reported) and communicating results to the patient/family/caregiver, or Care coordination (not separately reported).         Each patient to whom he or she provides medical services by telemedicine is:  (1) informed of the relationship between the physician and patient and the respective role of any other health care provider with respect to management of the patient; and (2) notified that he or she may decline to receive medical services by telemedicine and may withdraw from such care at any time.    Notes:     Patient ID: Hever Reyes Jr. is a 82 y.o. male.    Chief Complaint: No chief complaint on file.    HPI  patient visit today by or deal and with the help of a caregiver patient has been feeling weak and diarrhea multiple times a day multiple falls at home and decreased p.o. intake and weight loss and nausea he deny fever chill short of breath chest pain or abdominal pain but his sound very weak on the phone he has history of hypertension hypothyroidism BPH hyperlipidemia but has been quitting taking any of his medication for several weeks he also have history of intracerebral hemorrhage  Review of Systems   and history of coronary artery disease with history of MRI and stents placement he had thrombocytopenia but no history of bleeding  Objective:      Physical Exam  patient sounds very weak on the phone no appetite losing way and suffer with multiple falls at home and decreased urine  output  Assessment:       1. Gastroenteritis    2. Dehydration    3. Generalized weakness    4. Multiple falls    5. Traumatic hemorrhage of right cerebrum with loss of consciousness, subsequent encounter    6. Thrombocytopenia        Plan:       Gastroenteritis  Comments:  With severe diarrhea for several day    Dehydration  Comments:  Secondary to diarrhea and decreased p.o. intake    Generalized weakness  Comments:  Patient sounds very weak on the phone he need to go to the ER for IV fluid and further workup    Multiple falls  Comments:  Probably secondary to dehydration hypotension    Traumatic hemorrhage of right cerebrum with loss of consciousness, subsequent encounter  Comments:  Patient will need the MRI of the brain when he to the to re-evaluate could be the cause for his multiple falls    Thrombocytopenia  Comments:  Chronic mild thrombocytopenia without any bleeding disorder      Medication List with Changes/Refills   Current Medications    ASPIRIN (ECOTRIN) 81 MG EC TABLET    Take 1 tablet (81 mg total) by mouth once daily.    ATORVASTATIN (LIPITOR) 80 MG TABLET    Take 1 tablet (80 mg total) by mouth once daily.    FERROUS SULFATE (FEOSOL) 325 MG (65 MG IRON) TAB TABLET    Take 1 tablet (325 mg total) by mouth 2 (two) times daily.    LEVOTHYROXINE (SYNTHROID) 125 MCG TABLET    Take 1 tablet (125 mcg total) by mouth before breakfast.    LISINOPRIL 10 MG TABLET    Take 1 tablet (10 mg total) by mouth once daily.    METOPROLOL SUCCINATE (TOPROL-XL) 100 MG 24 HR TABLET    Take 1 tablet (100 mg total) by mouth once daily.    MULTIVITAMIN CAPSULE    Take 1 capsule by mouth once daily.    OMEGA-3 FATTY ACIDS/FISH OIL (FISH OIL-OMEGA-3 FATTY ACIDS) 300-1,000 MG CAPSULE    Take 1 capsule by mouth 2 (two) times daily.    OXYBUTYNIN (DITROPAN-XL) 10 MG 24 HR TABLET    Take 1 tablet (10 mg total) by mouth once daily.    TAMSULOSIN (FLOMAX) 0.4 MG CAP    Take 2 capsules (0.8 mg total) by mouth once daily.

## 2022-12-09 PROBLEM — R53.1 GENERAL WEAKNESS: Status: ACTIVE | Noted: 2022-12-09

## 2022-12-09 PROBLEM — R29.90 STROKE-LIKE SYMPTOMS: Status: RESOLVED | Noted: 2022-12-08 | Resolved: 2022-12-09

## 2022-12-22 ENCOUNTER — OFFICE VISIT (OUTPATIENT)
Dept: PRIMARY CARE CLINIC | Facility: CLINIC | Age: 82
End: 2022-12-22
Payer: MEDICARE

## 2022-12-22 VITALS
RESPIRATION RATE: 18 BRPM | OXYGEN SATURATION: 98 % | SYSTOLIC BLOOD PRESSURE: 120 MMHG | BODY MASS INDEX: 30.67 KG/M2 | DIASTOLIC BLOOD PRESSURE: 62 MMHG | HEART RATE: 62 BPM | HEIGHT: 65 IN

## 2022-12-22 DIAGNOSIS — Z23 NEED FOR VACCINATION: Primary | ICD-10-CM

## 2022-12-22 DIAGNOSIS — K52.9 CHRONIC DIARRHEA: ICD-10-CM

## 2022-12-22 DIAGNOSIS — G44.209 TENSION-TYPE HEADACHE, NOT INTRACTABLE, UNSPECIFIED CHRONICITY PATTERN: ICD-10-CM

## 2022-12-22 DIAGNOSIS — N32.81 OAB (OVERACTIVE BLADDER): ICD-10-CM

## 2022-12-22 PROCEDURE — G0009 PNEUMOCOCCAL CONJUGATE VACCINE 20-VALENT: ICD-10-PCS | Mod: S$GLB,,, | Performed by: INTERNAL MEDICINE

## 2022-12-22 PROCEDURE — 99999 PR PBB SHADOW E&M-EST. PATIENT-LVL III: CPT | Mod: PBBFAC,,, | Performed by: INTERNAL MEDICINE

## 2022-12-22 PROCEDURE — 1126F PR PAIN SEVERITY QUANTIFIED, NO PAIN PRESENT: ICD-10-PCS | Mod: CPTII,S$GLB,, | Performed by: INTERNAL MEDICINE

## 2022-12-22 PROCEDURE — 99213 OFFICE O/P EST LOW 20 MIN: CPT | Mod: 25,S$GLB,, | Performed by: INTERNAL MEDICINE

## 2022-12-22 PROCEDURE — 3074F SYST BP LT 130 MM HG: CPT | Mod: CPTII,S$GLB,, | Performed by: INTERNAL MEDICINE

## 2022-12-22 PROCEDURE — 3074F PR MOST RECENT SYSTOLIC BLOOD PRESSURE < 130 MM HG: ICD-10-PCS | Mod: CPTII,S$GLB,, | Performed by: INTERNAL MEDICINE

## 2022-12-22 PROCEDURE — G0009 ADMIN PNEUMOCOCCAL VACCINE: HCPCS | Mod: S$GLB,,, | Performed by: INTERNAL MEDICINE

## 2022-12-22 PROCEDURE — 90677 PNEUMOCOCCAL CONJUGATE VACCINE 20-VALENT: ICD-10-PCS | Mod: S$GLB,,, | Performed by: INTERNAL MEDICINE

## 2022-12-22 PROCEDURE — 1126F AMNT PAIN NOTED NONE PRSNT: CPT | Mod: CPTII,S$GLB,, | Performed by: INTERNAL MEDICINE

## 2022-12-22 PROCEDURE — 3078F DIAST BP <80 MM HG: CPT | Mod: CPTII,S$GLB,, | Performed by: INTERNAL MEDICINE

## 2022-12-22 PROCEDURE — 3288F FALL RISK ASSESSMENT DOCD: CPT | Mod: CPTII,S$GLB,, | Performed by: INTERNAL MEDICINE

## 2022-12-22 PROCEDURE — G0008 ADMIN INFLUENZA VIRUS VAC: HCPCS | Mod: S$GLB,,, | Performed by: INTERNAL MEDICINE

## 2022-12-22 PROCEDURE — 1159F PR MEDICATION LIST DOCUMENTED IN MEDICAL RECORD: ICD-10-PCS | Mod: CPTII,S$GLB,, | Performed by: INTERNAL MEDICINE

## 2022-12-22 PROCEDURE — 90694 VACC AIIV4 NO PRSRV 0.5ML IM: CPT | Mod: S$GLB,,, | Performed by: INTERNAL MEDICINE

## 2022-12-22 PROCEDURE — 90694 FLU VACCINE - QUADRIVALENT - ADJUVANTED: ICD-10-PCS | Mod: S$GLB,,, | Performed by: INTERNAL MEDICINE

## 2022-12-22 PROCEDURE — 1159F MED LIST DOCD IN RCRD: CPT | Mod: CPTII,S$GLB,, | Performed by: INTERNAL MEDICINE

## 2022-12-22 PROCEDURE — 3078F PR MOST RECENT DIASTOLIC BLOOD PRESSURE < 80 MM HG: ICD-10-PCS | Mod: CPTII,S$GLB,, | Performed by: INTERNAL MEDICINE

## 2022-12-22 PROCEDURE — 1100F PR PT FALLS ASSESS DOC 2+ FALLS/FALL W/INJURY/YR: ICD-10-PCS | Mod: CPTII,S$GLB,, | Performed by: INTERNAL MEDICINE

## 2022-12-22 PROCEDURE — G0008 FLU VACCINE - QUADRIVALENT - ADJUVANTED: ICD-10-PCS | Mod: S$GLB,,, | Performed by: INTERNAL MEDICINE

## 2022-12-22 PROCEDURE — 1160F RVW MEDS BY RX/DR IN RCRD: CPT | Mod: CPTII,S$GLB,, | Performed by: INTERNAL MEDICINE

## 2022-12-22 PROCEDURE — 3288F PR FALLS RISK ASSESSMENT DOCUMENTED: ICD-10-PCS | Mod: CPTII,S$GLB,, | Performed by: INTERNAL MEDICINE

## 2022-12-22 PROCEDURE — 99999 PR PBB SHADOW E&M-EST. PATIENT-LVL III: ICD-10-PCS | Mod: PBBFAC,,, | Performed by: INTERNAL MEDICINE

## 2022-12-22 PROCEDURE — 1100F PTFALLS ASSESS-DOCD GE2>/YR: CPT | Mod: CPTII,S$GLB,, | Performed by: INTERNAL MEDICINE

## 2022-12-22 PROCEDURE — 90677 PCV20 VACCINE IM: CPT | Mod: S$GLB,,, | Performed by: INTERNAL MEDICINE

## 2022-12-22 PROCEDURE — 99213 PR OFFICE/OUTPT VISIT, EST, LEVL III, 20-29 MIN: ICD-10-PCS | Mod: 25,S$GLB,, | Performed by: INTERNAL MEDICINE

## 2022-12-22 PROCEDURE — 1160F PR REVIEW ALL MEDS BY PRESCRIBER/CLIN PHARMACIST DOCUMENTED: ICD-10-PCS | Mod: CPTII,S$GLB,, | Performed by: INTERNAL MEDICINE

## 2022-12-22 RX ORDER — CHOLESTYRAMINE 4 G/9G
4 POWDER, FOR SUSPENSION ORAL
Qty: 270 PACKET | Refills: 3 | Status: SHIPPED | OUTPATIENT
Start: 2022-12-22 | End: 2023-03-03

## 2022-12-22 RX ORDER — OXYBUTYNIN CHLORIDE 10 MG/1
10 TABLET, EXTENDED RELEASE ORAL DAILY
Qty: 90 TABLET | Refills: 1 | Status: SHIPPED | OUTPATIENT
Start: 2022-12-22 | End: 2023-02-28 | Stop reason: SDUPTHER

## 2022-12-22 RX ORDER — BUTALBITAL, ACETAMINOPHEN AND CAFFEINE 50; 325; 40 MG/1; MG/1; MG/1
1 TABLET ORAL 2 TIMES DAILY PRN
Qty: 30 TABLET | Refills: 0 | Status: SHIPPED | OUTPATIENT
Start: 2022-12-22 | End: 2023-01-21

## 2022-12-24 NOTE — PROGRESS NOTES
Subjective:       Patient ID: Hever Reyes Jr. is a 82 y.o. male.    Chief Complaint: Follow-up (Hospital Follow up )    HPI patient with history subcortical cerebral hemorrhage hypertension hyperlipidemia patient patient today for follow-up from the hospital he was set need Monday for dysphagia and headache also hypothyroidism due to patient not taking his medication at home patient is back on his medication now with the help of his children and home health.  Family reports patient p.o. diet is good bowel movements okay no urinary incontinence does have generalized weakness ambulate with walker he does have chronic diarrhea loose stool no blood no mucus no abdominal pain no fever no weight loss .  Patient has appointment with ophthalmologist on a [address].  Patient currently live with his son  Review of Systems    Objective:      Physical Exam  Vitals and nursing note reviewed.   Constitutional:       General: He is not in acute distress.     Appearance: He is well-developed.   HENT:      Head: Normocephalic and atraumatic.      Right Ear: External ear normal.      Left Ear: External ear normal.      Nose: Nose normal.   Eyes:      General:         Right eye: No discharge.         Left eye: No discharge.      Conjunctiva/sclera: Conjunctivae normal.      Pupils: Pupils are equal, round, and reactive to light.   Neck:      Thyroid: No thyromegaly.   Cardiovascular:      Rate and Rhythm: Normal rate and regular rhythm.      Heart sounds: Normal heart sounds. No murmur heard.    No friction rub. No gallop.   Pulmonary:      Effort: Pulmonary effort is normal. No respiratory distress.      Breath sounds: Normal breath sounds. No wheezing.   Abdominal:      General: Bowel sounds are normal. There is no distension.      Palpations: Abdomen is soft.      Tenderness: There is no abdominal tenderness.   Musculoskeletal:         General: No tenderness or deformity. Normal range of motion.      Cervical back: Normal  range of motion and neck supple.   Lymphadenopathy:      Cervical: No cervical adenopathy.   Skin:     General: Skin is warm and dry.      Findings: No erythema or rash.   Neurological:      Mental Status: He is alert.      Comments: Generalized weakness ambulate with walker only   Psychiatric:         Mood and Affect: Mood normal.         Thought Content: Thought content normal.         Judgment: Judgment normal.       Assessment:       1. Need for vaccination    2. OAB (overactive bladder)    3. Tension-type headache, not intractable, unspecified chronicity pattern    4. Chronic diarrhea          Plan:       Need for vaccination  -     Influenza (FLUAD) - Quadrivalent (Adjuvanted) *Preferred* (65+) (PF)  -     Cancel: (In Office Administered) Pneumococcal Conjugate Vaccine (13 Valent) (IM)  -     (In Office Administered) Pneumococcal Conjugate Vaccine (20 Valent) (IM)    OAB (overactive bladder)  -     oxybutynin (DITROPAN-XL) 10 MG 24 hr tablet; Take 1 tablet (10 mg total) by mouth once daily.  Dispense: 90 tablet; Refill: 1    Tension-type headache, not intractable, unspecified chronicity pattern  -     butalbital-acetaminophen-caffeine -40 mg (FIORICET, ESGIC) -40 mg per tablet; Take 1 tablet by mouth 2 (two) times daily as needed for Headaches.  Dispense: 30 tablet; Refill: 0    Chronic diarrhea  -     cholestyramine (QUESTRAN) 4 gram packet; Take 1 packet (4 g total) by mouth 3 (three) times daily with meals.  Dispense: 270 packet; Refill: 3        Medication List with Changes/Refills   New Medications    BUTALBITAL-ACETAMINOPHEN-CAFFEINE -40 MG (FIORICET, ESGIC) -40 MG PER TABLET    Take 1 tablet by mouth 2 (two) times daily as needed for Headaches.    CHOLESTYRAMINE (QUESTRAN) 4 GRAM PACKET    Take 1 packet (4 g total) by mouth 3 (three) times daily with meals.   Current Medications    ASPIRIN (ECOTRIN) 81 MG EC TABLET    Take 1 tablet (81 mg total) by mouth once daily.     ATORVASTATIN (LIPITOR) 80 MG TABLET    Take 1 tablet (80 mg total) by mouth once daily.    FERROUS SULFATE (FEOSOL) 325 MG (65 MG IRON) TAB TABLET    Take 1 tablet (325 mg total) by mouth 2 (two) times daily.    LEVOTHYROXINE (SYNTHROID) 125 MCG TABLET    Take 1 tablet (125 mcg total) by mouth before breakfast.    LISINOPRIL 10 MG TABLET    Take 1 tablet (10 mg total) by mouth once daily.    METOPROLOL SUCCINATE (TOPROL-XL) 100 MG 24 HR TABLET    Take 1 tablet (100 mg total) by mouth once daily.    MULTIVITAMIN CAPSULE    Take 1 capsule by mouth once daily.    OMEGA-3 FATTY ACIDS/FISH OIL (FISH OIL-OMEGA-3 FATTY ACIDS) 300-1,000 MG CAPSULE    Take 1 capsule by mouth 2 (two) times daily.    TAMSULOSIN (FLOMAX) 0.4 MG CAP    Take 2 capsules (0.8 mg total) by mouth once daily.   Changed and/or Refilled Medications    Modified Medication Previous Medication    OXYBUTYNIN (DITROPAN-XL) 10 MG 24 HR TABLET oxybutynin (DITROPAN-XL) 10 MG 24 hr tablet       Take 1 tablet (10 mg total) by mouth once daily.    Take 1 tablet by mouth once daily

## 2023-01-03 ENCOUNTER — DOCUMENT SCAN (OUTPATIENT)
Dept: HOME HEALTH SERVICES | Facility: HOSPITAL | Age: 83
End: 2023-01-03
Payer: MEDICARE

## 2023-01-04 ENCOUNTER — TELEPHONE (OUTPATIENT)
Dept: PRIMARY CARE CLINIC | Facility: CLINIC | Age: 83
End: 2023-01-04
Payer: MEDICARE

## 2023-01-04 NOTE — TELEPHONE ENCOUNTER
----- Message from Tiffany Rizvi sent at 1/4/2023  9:29 AM CST -----  Contact: [facility] ([facility]) [phone]  Reporting a low heart rate for pt ( heart rate 48)  with cough x4 days

## 2023-01-04 NOTE — TELEPHONE ENCOUNTER
Called Adia with [facility] back but no answer. LVM saying that the patient need to make sure he is taking his thyroid medication and if he is taking it and his heart rate is still low then he need to go to the ER.

## 2023-01-06 ENCOUNTER — DOCUMENT SCAN (OUTPATIENT)
Dept: HOME HEALTH SERVICES | Facility: HOSPITAL | Age: 83
End: 2023-01-06
Payer: MEDICARE

## 2023-01-10 ENCOUNTER — TELEPHONE (OUTPATIENT)
Dept: PRIMARY CARE CLINIC | Facility: CLINIC | Age: 83
End: 2023-01-10
Payer: MEDICARE

## 2023-01-10 NOTE — TELEPHONE ENCOUNTER
----- Message from Payal Oliveira sent at 1/10/2023 12:31 PM CST -----  Contact: Yuliana with [facility] phone [phone]  Calling to inquire about the orders dated 12/21/2022, MD Agreement Order. They have not received them back. Please advise. Thanks.

## 2023-01-18 ENCOUNTER — TELEPHONE (OUTPATIENT)
Dept: PRIMARY CARE CLINIC | Facility: CLINIC | Age: 83
End: 2023-01-18
Payer: MEDICARE

## 2023-01-18 NOTE — TELEPHONE ENCOUNTER
----- Message from Aydee Cortez sent at 1/18/2023 11:42 AM CST -----  Needs to have home health and pt from home please call patient back

## 2023-01-21 ENCOUNTER — DOCUMENT SCAN (OUTPATIENT)
Dept: HOME HEALTH SERVICES | Facility: HOSPITAL | Age: 83
End: 2023-01-21
Payer: MEDICARE

## 2023-01-30 ENCOUNTER — EXTERNAL HOME HEALTH (OUTPATIENT)
Dept: HOME HEALTH SERVICES | Facility: HOSPITAL | Age: 83
End: 2023-01-30
Payer: MEDICARE

## 2023-01-30 ENCOUNTER — TELEPHONE (OUTPATIENT)
Dept: PRIMARY CARE CLINIC | Facility: CLINIC | Age: 83
End: 2023-01-30

## 2023-01-30 NOTE — TELEPHONE ENCOUNTER
----- Message from Franchesca Chandler sent at 1/30/2023  9:08 AM CST -----  Contact: Fouzia [phone]  Pt daughter Fouzia said she needs a call back about her dad getting a hospital bed, and a potty to but next to the bed. Pt was in the hospital 2 weeks ago.

## 2023-01-30 NOTE — TELEPHONE ENCOUNTER
Spoke with the pt's daughter and she is requesting an order for a hospital bed and a bedside commode.

## 2023-02-03 ENCOUNTER — TELEPHONE (OUTPATIENT)
Dept: PRIMARY CARE CLINIC | Facility: CLINIC | Age: 83
End: 2023-02-03
Payer: MEDICARE

## 2023-02-03 NOTE — TELEPHONE ENCOUNTER
----- Message from Antonella Montano sent at 2/3/2023 12:39 PM CST -----  Contact: Pts daughter Ms. Fragoso Mobile# [phone]  Patients daughter Ms. Fragoso also would like to know if the Bedside Commode came in for the patient?     She would like for you to give her a call please.

## 2023-02-03 NOTE — TELEPHONE ENCOUNTER
----- Message from Antonella Montano sent at 2/3/2023 12:36 PM CST -----  Contact: Ms. Fragoso Pts daughter Mobile# [phone]  Patients daughter Ms. Fragoso said that she put in an order for a Hospital bed for her dad, and she would like to know if the Hospital bed is ready for the patient?

## 2023-02-04 ENCOUNTER — TELEPHONE (OUTPATIENT)
Dept: PRIMARY CARE CLINIC | Facility: CLINIC | Age: 83
End: 2023-02-04
Payer: MEDICARE

## 2023-02-04 DIAGNOSIS — S06.349D TRAUMATIC HEMORRHAGE OF RIGHT CEREBRUM WITH LOSS OF CONSCIOUSNESS, SUBSEQUENT ENCOUNTER: ICD-10-CM

## 2023-02-04 DIAGNOSIS — R53.1 GENERALIZED WEAKNESS: Primary | ICD-10-CM

## 2023-02-04 DIAGNOSIS — R29.6 MULTIPLE FALLS: ICD-10-CM

## 2023-02-04 NOTE — TELEPHONE ENCOUNTER
Spoke with patients daughter and let her know that the order was put in and awaiting providers signature . Daughter stated understanding

## 2023-02-07 ENCOUNTER — TELEPHONE (OUTPATIENT)
Dept: PRIMARY CARE CLINIC | Facility: CLINIC | Age: 83
End: 2023-02-07
Payer: MEDICARE

## 2023-02-07 NOTE — TELEPHONE ENCOUNTER
----- Message from Armida Chatman sent at 2/7/2023  1:47 PM CST -----  Contact: [phone]  Pt's daughter Fouzia Conklin (MRN [id number] )called to speak to the nurse in reference to switching appointments with the pt so he can be seen sooner due to a knot on the back of his left leg right where the leg bends. She says it does not cause pain, but is getting bigger and is hard to the touch. Please Advise

## 2023-02-07 NOTE — TELEPHONE ENCOUNTER
----- Message from Shakira Croft sent at 2/7/2023 12:12 PM CST -----  Contact: Aggie with [facility]   [phone]  Need clarification on the requested hospital bed. Will it total electric?

## 2023-02-14 ENCOUNTER — TELEPHONE (OUTPATIENT)
Dept: PRIMARY CARE CLINIC | Facility: CLINIC | Age: 83
End: 2023-02-14
Payer: MEDICARE

## 2023-02-14 ENCOUNTER — TELEPHONE (OUTPATIENT)
Dept: PRIMARY CARE CLINIC | Facility: CLINIC | Age: 83
End: 2023-02-14
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
## 2023-02-14 NOTE — TELEPHONE ENCOUNTER
----- Message from Antonella Montano sent at 2/14/2023  9:37 AM CST -----  Contact: Ms. Loaizatany @ [facility] # [phone]  Ms. Chaudhari at [facility] said that you have placed and order for a electric hospital bed for the patient on February six, and she would like to know if you would like the bed to be fully electric or semi electric?

## 2023-02-14 NOTE — TELEPHONE ENCOUNTER
Spoke with Fara and let her know that the provider wants the patient with a fully electric bed. Fara stated understanding

## 2023-02-14 NOTE — TELEPHONE ENCOUNTER
----- Message from Antonella Montano sent at 2/14/2023 12:55 PM CST -----  Contact: Pts daughter Ms. Fragoso Mobile# [phone]  Patients daughter Ms. Fragoso would like to put in an order for and X-Ray for the patients left knee and left hip due to the patient having a fall.

## 2023-02-16 ENCOUNTER — TELEPHONE (OUTPATIENT)
Dept: PRIMARY CARE CLINIC | Facility: CLINIC | Age: 83
End: 2023-02-16
Payer: MEDICARE

## 2023-02-16 NOTE — TELEPHONE ENCOUNTER
----- Message from Sara Guallpa sent at 2/16/2023 11:31 AM CST -----  Contact: Fouzia/Daughter [phone]  2TESTRESULTS    Type: Test Results    What test was performed?  xrays    Who ordered the test?  Dr Carvalho    When and where were the test performed? 2/16/2023 [facility]    Would you like response via [email or web address]: Call back    Comments:    Please call and advise.    Thank You

## 2023-02-16 NOTE — TELEPHONE ENCOUNTER
----- Message from Marjorie Knott sent at 2/16/2023 11:43 AM CST -----  Contact: jeane (daughter) [phone]  Patient is returning a phone call.  Who left a message for the patient: Karley Olivarez MA   Does patient know what this is regarding:    Would you like a call back, or a response through your [facility] portal?:   call back  Comments:     Please call and advise

## 2023-02-20 ENCOUNTER — DOCUMENT SCAN (OUTPATIENT)
Dept: HOME HEALTH SERVICES | Facility: HOSPITAL | Age: 83
End: 2023-02-20
Payer: MEDICARE

## 2023-02-28 ENCOUNTER — OFFICE VISIT (OUTPATIENT)
Dept: PRIMARY CARE CLINIC | Facility: CLINIC | Age: 83
End: 2023-02-28
Payer: MEDICARE

## 2023-02-28 ENCOUNTER — TELEPHONE (OUTPATIENT)
Dept: PODIATRY | Facility: CLINIC | Age: 83
End: 2023-02-28
Payer: MEDICARE

## 2023-02-28 VITALS
RESPIRATION RATE: 17 BRPM | SYSTOLIC BLOOD PRESSURE: 104 MMHG | OXYGEN SATURATION: 99 % | BODY MASS INDEX: 30.67 KG/M2 | HEIGHT: 65 IN | DIASTOLIC BLOOD PRESSURE: 62 MMHG | TEMPERATURE: 100 F | HEART RATE: 81 BPM
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
## 2023-02-28 DIAGNOSIS — B35.1 ONYCHOMYCOSIS OF TOENAIL: Primary | ICD-10-CM

## 2023-02-28 DIAGNOSIS — S80.02XA TRAUMATIC HEMATOMA OF LEFT KNEE, INITIAL ENCOUNTER: ICD-10-CM

## 2023-02-28 DIAGNOSIS — D69.6 THROMBOCYTOPENIA: ICD-10-CM

## 2023-02-28 DIAGNOSIS — R54 FRAILTY SYNDROME IN GERIATRIC PATIENT: ICD-10-CM

## 2023-02-28 DIAGNOSIS — I10 ESSENTIAL HYPERTENSION: ICD-10-CM

## 2023-02-28 DIAGNOSIS — I69.154 HEMIPARESIS OF LEFT NONDOMINANT SIDE AS LATE EFFECT OF NONTRAUMATIC INTRACEREBRAL HEMORRHAGE: ICD-10-CM

## 2023-02-28 DIAGNOSIS — N32.81 OAB (OVERACTIVE BLADDER): ICD-10-CM

## 2023-02-28 DIAGNOSIS — I25.119 ATHEROSCLEROSIS OF NATIVE CORONARY ARTERY OF NATIVE HEART WITH ANGINA PECTORIS: ICD-10-CM

## 2023-02-28 DIAGNOSIS — N18.31 CHRONIC KIDNEY DISEASE, STAGE 3A: ICD-10-CM

## 2023-02-28 DIAGNOSIS — D64.9 ANEMIA, UNSPECIFIED TYPE: ICD-10-CM

## 2023-02-28 PROCEDURE — 3288F FALL RISK ASSESSMENT DOCD: CPT | Mod: CPTII,S$GLB,, | Performed by: INTERNAL MEDICINE

## 2023-02-28 PROCEDURE — 3288F PR FALLS RISK ASSESSMENT DOCUMENTED: ICD-10-PCS | Mod: CPTII,S$GLB,, | Performed by: INTERNAL MEDICINE

## 2023-02-28 PROCEDURE — 1159F MED LIST DOCD IN RCRD: CPT | Mod: CPTII,S$GLB,, | Performed by: INTERNAL MEDICINE

## 2023-02-28 PROCEDURE — 1160F RVW MEDS BY RX/DR IN RCRD: CPT | Mod: CPTII,S$GLB,, | Performed by: INTERNAL MEDICINE

## 2023-02-28 PROCEDURE — 99214 PR OFFICE/OUTPT VISIT, EST, LEVL IV, 30-39 MIN: ICD-10-PCS | Mod: S$GLB,,, | Performed by: INTERNAL MEDICINE

## 2023-02-28 PROCEDURE — 3078F DIAST BP <80 MM HG: CPT | Mod: CPTII,S$GLB,, | Performed by: INTERNAL MEDICINE

## 2023-02-28 PROCEDURE — 3074F PR MOST RECENT SYSTOLIC BLOOD PRESSURE < 130 MM HG: ICD-10-PCS | Mod: CPTII,S$GLB,, | Performed by: INTERNAL MEDICINE

## 2023-02-28 PROCEDURE — 1159F PR MEDICATION LIST DOCUMENTED IN MEDICAL RECORD: ICD-10-PCS | Mod: CPTII,S$GLB,, | Performed by: INTERNAL MEDICINE

## 2023-02-28 PROCEDURE — 1126F PR PAIN SEVERITY QUANTIFIED, NO PAIN PRESENT: ICD-10-PCS | Mod: CPTII,S$GLB,, | Performed by: INTERNAL MEDICINE

## 2023-02-28 PROCEDURE — 1101F PT FALLS ASSESS-DOCD LE1/YR: CPT | Mod: CPTII,S$GLB,, | Performed by: INTERNAL MEDICINE

## 2023-02-28 PROCEDURE — 1101F PR PT FALLS ASSESS DOC 0-1 FALLS W/OUT INJ PAST YR: ICD-10-PCS | Mod: CPTII,S$GLB,, | Performed by: INTERNAL MEDICINE

## 2023-02-28 PROCEDURE — 3078F PR MOST RECENT DIASTOLIC BLOOD PRESSURE < 80 MM HG: ICD-10-PCS | Mod: CPTII,S$GLB,, | Performed by: INTERNAL MEDICINE

## 2023-02-28 PROCEDURE — 99999 PR PBB SHADOW E&M-EST. PATIENT-LVL IV: ICD-10-PCS | Mod: PBBFAC,,, | Performed by: INTERNAL MEDICINE

## 2023-02-28 PROCEDURE — 99999 PR PBB SHADOW E&M-EST. PATIENT-LVL IV: CPT | Mod: PBBFAC,,, | Performed by: INTERNAL MEDICINE

## 2023-02-28 PROCEDURE — 1160F PR REVIEW ALL MEDS BY PRESCRIBER/CLIN PHARMACIST DOCUMENTED: ICD-10-PCS | Mod: CPTII,S$GLB,, | Performed by: INTERNAL MEDICINE

## 2023-02-28 PROCEDURE — 3074F SYST BP LT 130 MM HG: CPT | Mod: CPTII,S$GLB,, | Performed by: INTERNAL MEDICINE

## 2023-02-28 PROCEDURE — 1126F AMNT PAIN NOTED NONE PRSNT: CPT | Mod: CPTII,S$GLB,, | Performed by: INTERNAL MEDICINE

## 2023-02-28 PROCEDURE — 99214 OFFICE O/P EST MOD 30 MIN: CPT | Mod: S$GLB,,, | Performed by: INTERNAL MEDICINE

## 2023-02-28 RX ORDER — LISINOPRIL 20 MG/1
20 TABLET ORAL DAILY
Qty: 90 TABLET | Refills: 3 | OUTPATIENT
Start: 2023-02-28 | End: 2023-07-27

## 2023-02-28 RX ORDER — FERROUS SULFATE 325(65) MG
325 TABLET ORAL 2 TIMES DAILY
Qty: 60 TABLET | Refills: 3 | Status: SHIPPED | OUTPATIENT
Start: 2023-02-28 | End: 2023-08-11 | Stop reason: SDUPTHER

## 2023-02-28 RX ORDER — LISINOPRIL 20 MG/1
20 TABLET ORAL
COMMUNITY
Start: 2023-01-20 | End: 2023-02-28 | Stop reason: SDUPTHER

## 2023-02-28 RX ORDER — ASPIRIN 81 MG/1
81 TABLET ORAL DAILY
Qty: 90 TABLET | Refills: 3 | Status: SHIPPED | OUTPATIENT
Start: 2023-02-28 | End: 2023-06-16 | Stop reason: SDUPTHER

## 2023-02-28 RX ORDER — OXYBUTYNIN CHLORIDE 10 MG/1
10 TABLET, EXTENDED RELEASE ORAL DAILY
Qty: 90 TABLET | Refills: 1 | Status: SHIPPED | OUTPATIENT
Start: 2023-02-28 | End: 2023-08-11 | Stop reason: SDUPTHER

## 2023-02-28 NOTE — PROGRESS NOTES
Subjective:       Patient ID: Hever Reyes Jr. is a 82 y.o. male.    Chief Complaint: Knee Pain (Lump on left knee; Not painful), Toe Injury (R foot), and Abrasion (Back of leg)    HPI  patient visit today for follow-up he currently lives with his son he fell the other day his left knee now with tender nodule below the kneecap he also injury to the toenail which partially  from the nail bed and tender with any movement he has increase in fall since of any CVA with left hemiparesis he deny short of breath chest pain syncope no other injury he has generalized weakness he ambulate with walker at home his oral intake is still good so his about will he has over 80 bladder with frequent urination no dysuria no cloudiness of the urine or strong smell urine.  Patient does not want to take most of his medication anymore  Review of Systems    Objective:      Physical Exam  Vitals and nursing note reviewed.   Constitutional:       General: He is not in acute distress.     Appearance: He is well-developed.   HENT:      Head: Normocephalic and atraumatic.      Right Ear: External ear normal.      Left Ear: External ear normal.      Nose: Nose normal.      Mouth/Throat:      Pharynx: No oropharyngeal exudate.   Eyes:      Extraocular Movements: Extraocular movements intact.      Conjunctiva/sclera: Conjunctivae normal.      Pupils: Pupils are equal, round, and reactive to light.   Neck:      Thyroid: No thyromegaly.   Cardiovascular:      Rate and Rhythm: Normal rate and regular rhythm.      Heart sounds: Normal heart sounds. No murmur heard.    No friction rub. No gallop.   Pulmonary:      Effort: Pulmonary effort is normal. No respiratory distress.      Breath sounds: Normal breath sounds. No wheezing.   Abdominal:      General: Bowel sounds are normal. There is no distension.      Palpations: Abdomen is soft.      Tenderness: There is no abdominal tenderness.   Musculoskeletal:         General: No tenderness or  deformity. Normal range of motion.      Cervical back: Normal range of motion and neck supple.   Lymphadenopathy:      Cervical: No cervical adenopathy.   Skin:     General: Skin is warm and dry.      Findings: No erythema or rash.      Comments: 4 cm hematoma below the left kneecap and right 5th toenail hypertrophic and lose remaining of toenail has yellow discoloration thickening and brittle   Neurological:      Mental Status: He is alert and oriented to person, place, and time.      Comments: Generalized weakness minimal verbal response but follow verbal commands well   Psychiatric:         Thought Content: Thought content normal.         Judgment: Judgment normal.       Assessment:       1. Onychomycosis of toenail    2. Essential hypertension    3. OAB (overactive bladder)    4. Anemia, unspecified type    5. Fall, initial encounter    6. Traumatic hematoma of left knee, initial encounter    7. Frailty syndrome in geriatric patient    8. Thrombocytopenia    9. 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 Chronic kidney disease, stage 3a    11. Atherosclerosis of native coronary artery of native heart with angina pectoris          Plan:       Onychomycosis of toenail  -     Ambulatory referral/consult to Podiatry; Future; Expected date: 03/07/2023    Essential hypertension  -     aspirin (ECOTRIN) 81 MG EC tablet; Take 1 tablet (81 mg total) by mouth once daily.  Dispense: 90 tablet; Refill: 3  -     lisinopriL (PRINIVIL,ZESTRIL) 20 MG tablet; Take 1 tablet (20 mg total) by mouth once daily.  Dispense: 90 tablet; Refill: 3    OAB (overactive bladder)  -     oxybutynin (DITROPAN-XL) 10 MG 24 hr tablet; Take 1 tablet (10 mg total) by mouth once daily.  Dispense: 90 tablet; Refill: 1    Anemia, unspecified type  -     ferrous sulfate (FEOSOL) 325 mg (65 mg iron) Tab tablet; Take 1 tablet (325 mg total) by mouth 2 (two) times daily.  Dispense: 60 tablet; Refill:  3    Fall, initial encounter  Comments:  fall precaution patient will not stay still at home according to his son    Traumatic hematoma of left knee, initial encounter  Comments:  Clean the skin with alcohol swab and aspirate a 3 cc of red blood patient tolerated well wrap with Coban    Frailty syndrome in geriatric patient    Thrombocytopenia  Comments:  Mild thrombocytopenia no bleeding no change    Hemiparesis of left nondominant side as late effect of nontraumatic intracerebral hemorrhage    Chronic kidney disease, stage 3a  Comments:  Slight improvement with lower creatinine    Atherosclerosis of native coronary artery of native heart with angina pectoris  Comments:  Patient follow-up at [facility]        Medication List with Changes/Refills   Current Medications    LEVOTHYROXINE (SYNTHROID) 125 MCG TABLET    Take 1 tablet (125 mcg total) by mouth before breakfast.   Changed and/or Refilled Medications    Modified Medication Previous Medication    ASPIRIN (ECOTRIN) 81 MG EC TABLET aspirin (ECOTRIN) 81 MG EC tablet       Take 1 tablet (81 mg total) by mouth once daily.    Take 1 tablet (81 mg total) by mouth once daily.    FERROUS SULFATE (FEOSOL) 325 MG (65 MG IRON) TAB TABLET ferrous sulfate (FEOSOL) 325 mg (65 mg iron) Tab tablet       Take 1 tablet (325 mg total) by mouth 2 (two) times daily.    Take 1 tablet (325 mg total) by mouth 2 (two) times daily.    LISINOPRIL (PRINIVIL,ZESTRIL) 20 MG TABLET lisinopriL (PRINIVIL,ZESTRIL) 20 MG tablet       Take 1 tablet (20 mg total) by mouth once daily.    Take 20 mg by mouth.    OXYBUTYNIN (DITROPAN-XL) 10 MG 24 HR TABLET oxybutynin (DITROPAN-XL) 10 MG 24 hr tablet       Take 1 tablet (10 mg total) by mouth once daily.    Take 1 tablet (10 mg total) by mouth once daily.   Discontinued Medications    ATORVASTATIN (LIPITOR) 80 MG TABLET    Take 1 tablet (80 mg total) by mouth once daily.    CHOLESTYRAMINE (QUESTRAN) 4 GRAM PACKET    Take 1 packet (4 g  total) by mouth 3 (three) times daily with meals.    LISINOPRIL 10 MG TABLET    Take 1 tablet (10 mg total) by mouth once daily.    METOPROLOL SUCCINATE (TOPROL-XL) 100 MG 24 HR TABLET    Take 1 tablet (100 mg total) by mouth once daily.    MULTIVITAMIN CAPSULE    Take 1 capsule by mouth once daily.    OMEGA-3 FATTY ACIDS/FISH OIL (FISH OIL-OMEGA-3 FATTY ACIDS) 300-1,000 MG CAPSULE    Take 1 capsule by mouth 2 (two) times daily.    TAMSULOSIN (FLOMAX) 0.4 MG CAP    Take 2 capsules (0.8 mg total) by mouth once daily.

## 2023-02-28 NOTE — TELEPHONE ENCOUNTER
----- Message from Aydee Cortez sent at 2/28/2023  3:07 PM CST -----  Onychomycosis of toenail [B35.1] please call patient back to schedule appointment

## 2023-03-03 PROBLEM — N18.31 CHRONIC KIDNEY DISEASE, STAGE 3A: Status: ACTIVE | Noted: 2023-03-03

## 2023-03-06 ENCOUNTER — TELEPHONE (OUTPATIENT)
Dept: PRIMARY CARE CLINIC | Facility: CLINIC | Age: 83
End: 2023-03-06
Payer: MEDICARE

## 2023-03-06 ENCOUNTER — TELEPHONE (OUTPATIENT)
Dept: PODIATRY | Facility: CLINIC | Age: 83
End: 2023-03-06
Payer: MEDICARE

## 2023-03-06 NOTE — TELEPHONE ENCOUNTER
----- Message from Aydee Cortez sent at 3/6/2023  8:25 AM CST -----  Onychomycosis of toenail [B35.1] please call patient back to schedule referral in

## 2023-03-06 NOTE — TELEPHONE ENCOUNTER
----- Message from Antonella Montano sent at 3/6/2023  8:21 AM CST -----  Contact: Pts mobile [phone] pts daughter Ms. Fragoso called  Requesting an RX refill or new RX.  Is this a refill or new RX: Refill  RX name and strength Mircazapine 7.5 MG Tablets  Is this a 30 day or 90 day RX: 30  Pharmacy name and phone # [facility] [address] [place] [facility] [address]  [address]  [address] [place] [address]  Phone: [phone] Fax: [phone]  The doctors have asked that we provide their patients with the following 2 reminders -- prescription refills can take up to 72 hours, and a friendly reminder that in the future you can use your [facility] account to request refills:

## 2023-03-15 ENCOUNTER — TELEPHONE (OUTPATIENT)
Dept: PRIMARY CARE CLINIC | Facility: CLINIC | Age: 83
End: 2023-03-15
Payer: MEDICARE

## 2023-03-15 DIAGNOSIS — R63.4 WEIGHT LOSS: Primary | ICD-10-CM

## 2023-03-15 DIAGNOSIS — G47.00 INSOMNIA, UNSPECIFIED TYPE: ICD-10-CM

## 2023-03-15 RX ORDER — MIRTAZAPINE 15 MG/1
15 TABLET, FILM COATED ORAL NIGHTLY
Qty: 30 TABLET | Refills: 11 | Status: SHIPPED | OUTPATIENT
Start: 2023-03-15 | End: 2023-06-16 | Stop reason: ALTCHOICE

## 2023-04-11 ENCOUNTER — TELEPHONE (OUTPATIENT)
Dept: PRIMARY CARE CLINIC | Facility: CLINIC | Age: 83
End: 2023-04-11
Payer: MEDICARE

## 2023-04-11 NOTE — TELEPHONE ENCOUNTER
----- Message from Payal Oliveira sent at 4/11/2023  3:24 PM CDT -----  Contact: Yuliana with [facility] phone [phone]  Calling to inquire about the orders for physical therapy that was faxed. Please advise. Thanks.

## 2023-04-13 ENCOUNTER — TELEPHONE (OUTPATIENT)
Dept: PRIMARY CARE CLINIC | Facility: CLINIC | Age: 83
End: 2023-04-13
Payer: MEDICARE

## 2023-04-13 DIAGNOSIS — I69.154 HEMIPARESIS OF LEFT NONDOMINANT SIDE AS LATE EFFECT OF NONTRAUMATIC INTRACEREBRAL HEMORRHAGE: Primary | ICD-10-CM

## 2023-04-13 RX ORDER — CLOPIDOGREL BISULFATE 75 MG/1
75 TABLET ORAL DAILY
Qty: 30 TABLET | Refills: 1 | Status: SHIPPED | OUTPATIENT
Start: 2023-04-13 | End: 2023-08-11

## 2023-04-13 NOTE — TELEPHONE ENCOUNTER
----- Message from Mildred Johnston sent at 4/13/2023  1:44 PM CDT -----  Contact: [phone] - pt  Pt daughterFouzia is calling to request refill of Rx 75 mg clopidogrel when he was admitted in [facility] for a stroke.       Please call pt daughter and advise if Clopidogrel is similar to the Lisinopril or Asprin or if you can fill new Rx for pt     Please call pt Daughter Fouzia @ [phone]       Requesting an RX refill or new RX. new  Is this a refill or new RX: new  RX name and strength (copy/paste from chart):  Clopidogrel 75 mg (Plavix)   Is this a 30 day or 90 day RX: 90  Pharmacy name and phone # (copy/paste from chart):      [address] [place] [facility] [address] [facility] [email or web address] Ã[id number]€˜ [facility]  [address] [facility]  [place] [address]  Phone: [phone] Fax: [phone]

## 2023-04-13 NOTE — TELEPHONE ENCOUNTER
"Called patient daughter to inform her of the message from Dr. Carvalho. Patient did not answer LVM.     "It is similar to asa help preventstroke but if any bleeding need to stop"  "

## 2023-05-04 ENCOUNTER — OFFICE VISIT (OUTPATIENT)
Dept: PRIMARY CARE CLINIC | Facility: CLINIC | Age: 83
End: 2023-05-04
Payer: MEDICARE

## 2023-05-04 VITALS
SYSTOLIC BLOOD PRESSURE: 110 MMHG | HEART RATE: 81 BPM | RESPIRATION RATE: 17 BRPM | OXYGEN SATURATION: 97 % | DIASTOLIC BLOOD PRESSURE: 64 MMHG | HEIGHT: 65 IN | BODY MASS INDEX: 30.67 KG/M2

## 2023-05-04 DIAGNOSIS — R19.7 DIARRHEA, UNSPECIFIED TYPE: ICD-10-CM

## 2023-05-04 DIAGNOSIS — S80.02XA HEMATOMA OF LEFT KNEE REGION: ICD-10-CM

## 2023-05-04 DIAGNOSIS — I10 ESSENTIAL HYPERTENSION: ICD-10-CM

## 2023-05-04 DIAGNOSIS — L89.152 PRESSURE INJURY OF SACRAL REGION, STAGE 2: Primary | ICD-10-CM

## 2023-05-04 PROCEDURE — 3074F SYST BP LT 130 MM HG: CPT | Mod: CPTII,S$GLB,, | Performed by: INTERNAL MEDICINE

## 2023-05-04 PROCEDURE — 3288F FALL RISK ASSESSMENT DOCD: CPT | Mod: CPTII,S$GLB,, | Performed by: INTERNAL MEDICINE

## 2023-05-04 PROCEDURE — 1101F PR PT FALLS ASSESS DOC 0-1 FALLS W/OUT INJ PAST YR: ICD-10-PCS | Mod: CPTII,S$GLB,, | Performed by: INTERNAL MEDICINE

## 2023-05-04 PROCEDURE — 1101F PT FALLS ASSESS-DOCD LE1/YR: CPT | Mod: CPTII,S$GLB,, | Performed by: INTERNAL MEDICINE

## 2023-05-04 PROCEDURE — 1160F PR REVIEW ALL MEDS BY PRESCRIBER/CLIN PHARMACIST DOCUMENTED: ICD-10-PCS | Mod: CPTII,S$GLB,, | Performed by: INTERNAL MEDICINE

## 2023-05-04 PROCEDURE — 99214 PR OFFICE/OUTPT VISIT, EST, LEVL IV, 30-39 MIN: ICD-10-PCS | Mod: S$GLB,,, | Performed by: INTERNAL MEDICINE

## 2023-05-04 PROCEDURE — 99999 PR PBB SHADOW E&M-EST. PATIENT-LVL III: ICD-10-PCS | Mod: PBBFAC,,, | Performed by: INTERNAL MEDICINE

## 2023-05-04 PROCEDURE — 3288F PR FALLS RISK ASSESSMENT DOCUMENTED: ICD-10-PCS | Mod: CPTII,S$GLB,, | Performed by: INTERNAL MEDICINE

## 2023-05-04 PROCEDURE — 99214 OFFICE O/P EST MOD 30 MIN: CPT | Mod: S$GLB,,, | Performed by: INTERNAL MEDICINE

## 2023-05-04 PROCEDURE — 3074F PR MOST RECENT SYSTOLIC BLOOD PRESSURE < 130 MM HG: ICD-10-PCS | Mod: CPTII,S$GLB,, | Performed by: INTERNAL MEDICINE

## 2023-05-04 PROCEDURE — 3078F DIAST BP <80 MM HG: CPT | Mod: CPTII,S$GLB,, | Performed by: INTERNAL MEDICINE

## 2023-05-04 PROCEDURE — 3078F PR MOST RECENT DIASTOLIC BLOOD PRESSURE < 80 MM HG: ICD-10-PCS | Mod: CPTII,S$GLB,, | Performed by: INTERNAL MEDICINE

## 2023-05-04 PROCEDURE — 1160F RVW MEDS BY RX/DR IN RCRD: CPT | Mod: CPTII,S$GLB,, | Performed by: INTERNAL MEDICINE

## 2023-05-04 PROCEDURE — 1125F AMNT PAIN NOTED PAIN PRSNT: CPT | Mod: CPTII,S$GLB,, | Performed by: INTERNAL MEDICINE

## 2023-05-04 PROCEDURE — 99999 PR PBB SHADOW E&M-EST. PATIENT-LVL III: CPT | Mod: PBBFAC,,, | Performed by: INTERNAL MEDICINE

## 2023-05-04 PROCEDURE — 1159F PR MEDICATION LIST DOCUMENTED IN MEDICAL RECORD: ICD-10-PCS | Mod: CPTII,S$GLB,, | Performed by: INTERNAL MEDICINE

## 2023-05-04 PROCEDURE — 1159F MED LIST DOCD IN RCRD: CPT | Mod: CPTII,S$GLB,, | Performed by: INTERNAL MEDICINE

## 2023-05-04 PROCEDURE — 1125F PR PAIN SEVERITY QUANTIFIED, PAIN PRESENT: ICD-10-PCS | Mod: CPTII,S$GLB,, | Performed by: INTERNAL MEDICINE

## 2023-05-04 RX ORDER — ATORVASTATIN CALCIUM 80 MG/1
80 TABLET, FILM COATED ORAL
COMMUNITY
Start: 2023-03-09 | End: 2023-08-11 | Stop reason: SDUPTHER

## 2023-05-04 RX ORDER — DRESSING,ODOR ABSORBENT,H-POLY 4" X 4"
BANDAGE MISCELLANEOUS
Qty: 10 EACH | Refills: 1 | Status: SHIPPED | OUTPATIENT
Start: 2023-05-04 | End: 2024-01-30 | Stop reason: SDUPTHER

## 2023-05-04 RX ORDER — DIPHENOXYLATE HYDROCHLORIDE AND ATROPINE SULFATE 2.5; .025 MG/1; MG/1
1 TABLET ORAL 4 TIMES DAILY PRN
Qty: 30 TABLET | Refills: 1 | Status: SHIPPED | OUTPATIENT
Start: 2023-05-04 | End: 2023-05-14

## 2023-05-04 NOTE — PROGRESS NOTES
Subjective:       Patient ID: Hever Reyes Jr. is a 82 y.o. male.    Chief Complaint: Follow-up (2 month follow up ), Buttocks sore , Knot on Knee  (Left Knee ), and Diarrhea (Going on for a while but has been really bad yesterday and today. )    HPI  patient visit today for follow-up he currently live with his daughter who is  his primary care at home with home health he now dependent on her for his daily living activity his p.o. diet is still good bowel movement is normal no problem with urination-no chest pain short of breath his stool main physical complaint today is hematoma in the left knee and skin breakdown in the sacrum he has it aspirate in the past but swell up again he is not on any anticoagulant.  His daughter also reports patient having diarrhea loose stool no blood no mucus no abdominal pain denies constipation  Review of Systems    Objective:      Physical Exam  Vitals and nursing note reviewed.   Constitutional:       General: He is not in acute distress.     Appearance: He is well-developed.      Comments: Patient is in a wheelchair no distress but minimal verbal communication   HENT:      Head: Normocephalic and atraumatic.      Right Ear: External ear normal.      Left Ear: External ear normal.      Nose: Nose normal.      Mouth/Throat:      Pharynx: No oropharyngeal exudate.   Eyes:      General:         Right eye: No discharge.         Left eye: No discharge.      Conjunctiva/sclera: Conjunctivae normal.      Pupils: Pupils are equal, round, and reactive to light.   Neck:      Thyroid: No thyromegaly.   Cardiovascular:      Rate and Rhythm: Normal rate and regular rhythm.      Heart sounds: Normal heart sounds. No murmur heard.    No friction rub. No gallop.   Pulmonary:      Effort: Pulmonary effort is normal. No respiratory distress.      Breath sounds: Normal breath sounds. No wheezing.   Abdominal:      General: Bowel sounds are normal. There is no distension.      Palpations: Abdomen is  "soft.      Tenderness: There is no abdominal tenderness.   Musculoskeletal:         General: No tenderness or deformity. Normal range of motion.      Cervical back: Normal range of motion and neck supple.      Comments: Left knee with hematoma under the left knee cap no erythema   Lymphadenopathy:      Cervical: No cervical adenopathy.   Skin:     General: Skin is warm and dry.      Capillary Refill: Capillary refill takes less than 2 seconds.      Findings: No erythema or rash.      Comments: A grade 2 sacral decubitus no erythema no exudate   Neurological:      Mental Status: He is alert and oriented to person, place, and time.   Psychiatric:         Thought Content: Thought content normal.         Judgment: Judgment normal.       Assessment:       1. Pressure injury of sacral region, stage 2    2. Diarrhea, unspecified type    3. Hematoma of left knee region    4. Essential hypertension        Plan:       Pressure injury of sacral region, stage 2  -     hydrocolloid dressing (DUODERM HYDROACTIVE) 4 X 4 " Bndg; Apply decubitus q 3 days  Dispense: 10 each; Refill: 1    Diarrhea, unspecified type  -     diphenoxylate-atropine 2.5-0.025 mg (LOMOTIL) 2.5-0.025 mg per tablet; Take 1 tablet by mouth 4 (four) times daily as needed for Diarrhea.  Dispense: 30 tablet; Refill: 1    Hematoma of left knee region  Comments:  After anesthetized locally aspirate with 18 gauge needle 3 cc of old blood removed patient tolerate well  Orders:  -     Ambulatory referral/consult to General Surgery; Future; Expected date: 05/06/2023    Essential hypertension  Comments:  Blood pressure well control continue with treatment        Medication List with Changes/Refills   New Medications    DIPHENOXYLATE-ATROPINE 2.5-0.025 MG (LOMOTIL) 2.5-0.025 MG PER TABLET    Take 1 tablet by mouth 4 (four) times daily as needed for Diarrhea.    HYDROCOLLOID DRESSING (DUODERM HYDROACTIVE) 4 X 4 " BNDG    Apply decubitus q 3 days   Current Medications    " ASPIRIN (ECOTRIN) 81 MG EC TABLET    Take 1 tablet (81 mg total) by mouth once daily.    ATORVASTATIN (LIPITOR) 80 MG TABLET    Take 80 mg by mouth.    CLOPIDOGREL (PLAVIX) 75 MG TABLET    Take 1 tablet (75 mg total) by mouth once daily.    FERROUS SULFATE (FEOSOL) 325 MG (65 MG IRON) TAB TABLET    Take 1 tablet (325 mg total) by mouth 2 (two) times daily.    LEVOTHYROXINE (SYNTHROID) 125 MCG TABLET    Take 1 tablet (125 mcg total) by mouth before breakfast.    LISINOPRIL (PRINIVIL,ZESTRIL) 20 MG TABLET    Take 1 tablet (20 mg total) by mouth once daily.    MIRTAZAPINE (REMERON) 15 MG TABLET    Take 1 tablet (15 mg total) by mouth every evening.    OXYBUTYNIN (DITROPAN-XL) 10 MG 24 HR TABLET    Take 1 tablet (10 mg total) by mouth once daily.

## 2023-05-05 ENCOUNTER — TELEPHONE (OUTPATIENT)
Dept: SURGERY | Facility: CLINIC | Age: 83
End: 2023-05-05
Payer: MEDICARE

## 2023-06-09 ENCOUNTER — TELEPHONE (OUTPATIENT)
Dept: PRIMARY CARE CLINIC | Facility: CLINIC | Age: 83
End: 2023-06-09
Payer: MEDICARE

## 2023-06-09 NOTE — TELEPHONE ENCOUNTER
----- Message from Guanaco Nichols sent at 6/9/2023  7:58 AM CDT -----  Contact: Daughter/ Fouzia [phone]  Please call the patient to schedule a hospital f/u, he was discharged from [facility] on 6/4/2023 and his BP is still running high.    Thank you

## 2023-06-09 NOTE — TELEPHONE ENCOUNTER
Called patient daughter to get the patient scheduled for a hospital follow up. Patient was scheduled and verbalized understanding of the date and time.

## 2023-06-16 ENCOUNTER — OFFICE VISIT (OUTPATIENT)
Dept: PRIMARY CARE CLINIC | Facility: CLINIC | Age: 83
End: 2023-06-16
Payer: MEDICARE

## 2023-06-16 VITALS
HEIGHT: 65 IN | DIASTOLIC BLOOD PRESSURE: 62 MMHG | OXYGEN SATURATION: 98 % | HEART RATE: 70 BPM | BODY MASS INDEX: 30.67 KG/M2 | SYSTOLIC BLOOD PRESSURE: 116 MMHG | RESPIRATION RATE: 17 BRPM | TEMPERATURE: 99 F

## 2023-06-16 DIAGNOSIS — S80.02XA HEMATOMA OF LEFT KNEE REGION: Primary | ICD-10-CM

## 2023-06-16 DIAGNOSIS — I69.154 HEMIPARESIS OF LEFT NONDOMINANT SIDE AS LATE EFFECT OF NONTRAUMATIC INTRACEREBRAL HEMORRHAGE: ICD-10-CM

## 2023-06-16 DIAGNOSIS — E03.9 HYPOTHYROIDISM, UNSPECIFIED TYPE: ICD-10-CM

## 2023-06-16 DIAGNOSIS — I10 ESSENTIAL HYPERTENSION: ICD-10-CM

## 2023-06-16 PROCEDURE — 3078F PR MOST RECENT DIASTOLIC BLOOD PRESSURE < 80 MM HG: ICD-10-PCS | Mod: CPTII,S$GLB,, | Performed by: INTERNAL MEDICINE

## 2023-06-16 PROCEDURE — 3288F FALL RISK ASSESSMENT DOCD: CPT | Mod: CPTII,S$GLB,, | Performed by: INTERNAL MEDICINE

## 2023-06-16 PROCEDURE — 1159F MED LIST DOCD IN RCRD: CPT | Mod: CPTII,S$GLB,, | Performed by: INTERNAL MEDICINE

## 2023-06-16 PROCEDURE — 99213 PR OFFICE/OUTPT VISIT, EST, LEVL III, 20-29 MIN: ICD-10-PCS | Mod: S$GLB,,, | Performed by: INTERNAL MEDICINE

## 2023-06-16 PROCEDURE — 1100F PTFALLS ASSESS-DOCD GE2>/YR: CPT | Mod: CPTII,S$GLB,, | Performed by: INTERNAL MEDICINE

## 2023-06-16 PROCEDURE — 1126F AMNT PAIN NOTED NONE PRSNT: CPT | Mod: CPTII,S$GLB,, | Performed by: INTERNAL MEDICINE

## 2023-06-16 PROCEDURE — 1160F PR REVIEW ALL MEDS BY PRESCRIBER/CLIN PHARMACIST DOCUMENTED: ICD-10-PCS | Mod: CPTII,S$GLB,, | Performed by: INTERNAL MEDICINE

## 2023-06-16 PROCEDURE — 3074F SYST BP LT 130 MM HG: CPT | Mod: CPTII,S$GLB,, | Performed by: INTERNAL MEDICINE

## 2023-06-16 PROCEDURE — 99999 PR PBB SHADOW E&M-EST. PATIENT-LVL IV: CPT | Mod: PBBFAC,,, | Performed by: INTERNAL MEDICINE

## 2023-06-16 PROCEDURE — 1126F PR PAIN SEVERITY QUANTIFIED, NO PAIN PRESENT: ICD-10-PCS | Mod: CPTII,S$GLB,, | Performed by: INTERNAL MEDICINE

## 2023-06-16 PROCEDURE — 3288F PR FALLS RISK ASSESSMENT DOCUMENTED: ICD-10-PCS | Mod: CPTII,S$GLB,, | Performed by: INTERNAL MEDICINE

## 2023-06-16 PROCEDURE — 1159F PR MEDICATION LIST DOCUMENTED IN MEDICAL RECORD: ICD-10-PCS | Mod: CPTII,S$GLB,, | Performed by: INTERNAL MEDICINE

## 2023-06-16 PROCEDURE — 3074F PR MOST RECENT SYSTOLIC BLOOD PRESSURE < 130 MM HG: ICD-10-PCS | Mod: CPTII,S$GLB,, | Performed by: INTERNAL MEDICINE

## 2023-06-16 PROCEDURE — 1100F PR PT FALLS ASSESS DOC 2+ FALLS/FALL W/INJURY/YR: ICD-10-PCS | Mod: CPTII,S$GLB,, | Performed by: INTERNAL MEDICINE

## 2023-06-16 PROCEDURE — 3078F DIAST BP <80 MM HG: CPT | Mod: CPTII,S$GLB,, | Performed by: INTERNAL MEDICINE

## 2023-06-16 PROCEDURE — 99999 PR PBB SHADOW E&M-EST. PATIENT-LVL IV: ICD-10-PCS | Mod: PBBFAC,,, | Performed by: INTERNAL MEDICINE

## 2023-06-16 PROCEDURE — 1160F RVW MEDS BY RX/DR IN RCRD: CPT | Mod: CPTII,S$GLB,, | Performed by: INTERNAL MEDICINE

## 2023-06-16 PROCEDURE — 99213 OFFICE O/P EST LOW 20 MIN: CPT | Mod: S$GLB,,, | Performed by: INTERNAL MEDICINE

## 2023-06-16 RX ORDER — QUETIAPINE FUMARATE 25 MG/1
25 TABLET, FILM COATED ORAL NIGHTLY
COMMUNITY
Start: 2023-06-04 | End: 2023-07-20

## 2023-06-16 RX ORDER — DONEPEZIL HYDROCHLORIDE 10 MG/1
10 TABLET, FILM COATED ORAL
COMMUNITY
Start: 2023-06-04 | End: 2023-08-11 | Stop reason: SDUPTHER

## 2023-06-16 RX ORDER — ESCITALOPRAM OXALATE 10 MG/1
10 TABLET ORAL
COMMUNITY
Start: 2023-06-04 | End: 2023-08-11 | Stop reason: SDUPTHER

## 2023-06-16 RX ORDER — LEVOTHYROXINE SODIUM 125 UG/1
125 TABLET ORAL
Qty: 90 TABLET | Refills: 3 | Status: SHIPPED | OUTPATIENT
Start: 2023-06-16 | End: 2023-08-11 | Stop reason: SDUPTHER

## 2023-06-16 RX ORDER — ASPIRIN 81 MG/1
81 TABLET ORAL DAILY
Qty: 90 TABLET | Refills: 3 | Status: SHIPPED | OUTPATIENT
Start: 2023-06-16 | End: 2023-08-11 | Stop reason: SDUPTHER

## 2023-06-16 NOTE — PROGRESS NOTES
Subjective:       Patient ID: Hever Reyes Jr. is a 82 y.o. male.    Chief Complaint: Hospital Follow Up ([facility]; Mild stroke due to elevated BP June 2nd)    HPI  Pt visit today for routine f/u pt ha sbeen out of thyoid medication he lives with daughter and son in law she report pt ha sgood appetide but still loosing wt  no sob cp no abd pain no dysuria he can ambulate with walker at home but not much and he was at [facility] 3 weeks ago for mild CVA . The hematoma at left kneee is still there hurt when accidentally hit it or knee l  Review of Systems    Objective:      Physical Exam  Vitals and nursing note reviewed.   Constitutional:       General: He is not in acute distress.     Appearance: He is well-developed.      Comments: Pt is in wheel chair no distres   HENT:      Head: Normocephalic and atraumatic.      Right Ear: External ear normal.      Left Ear: External ear normal.      Nose: Nose normal.      Mouth/Throat:      Pharynx: No oropharyngeal exudate.   Eyes:      Conjunctiva/sclera: Conjunctivae normal.      Pupils: Pupils are equal, round, and reactive to light.   Neck:      Thyroid: No thyromegaly.   Cardiovascular:      Rate and Rhythm: Normal rate and regular rhythm.      Heart sounds: Normal heart sounds. No murmur heard.    No friction rub. No gallop.   Pulmonary:      Effort: Pulmonary effort is normal. No respiratory distress.      Breath sounds: Normal breath sounds. No wheezing.   Abdominal:      General: Bowel sounds are normal. There is no distension.      Palpations: Abdomen is soft.      Tenderness: There is no abdominal tenderness.   Musculoskeletal:         General: No tenderness or deformity. Normal range of motion.      Cervical back: Normal range of motion and neck supple.   Lymphadenopathy:      Cervical: No cervical adenopathy.   Skin:     General: Skin is warm and dry.      Capillary Refill: Capillary refill takes less than 2 seconds.      Findings: No erythema or rash.      Comments:  "Firm tender nodule at left knee cap size 3 cm   Neurological:      Mental Status: He is alert and oriented to person, place, and time.   Psychiatric:         Thought Content: Thought content normal.         Judgment: Judgment normal.       Assessment:       1. Hematoma of left knee region    2. Hypothyroidism, unspecified type    3. Essential hypertension    4. 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  Dispense: 90 tablet; Refill: 3        Medication List with Changes/Refills   Current Medications    ATORVASTATIN (LIPITOR) 80 MG TABLET    Take 80 mg by mouth.    CLOPIDOGREL (PLAVIX) 75 MG TABLET    Take 1 tablet (75 mg total) by mouth once daily.    DONEPEZIL (ARICEPT) 10 MG TABLET    Take 10 mg by mouth.    ESCITALOPRAM OXALATE (LEXAPRO) 10 MG TABLET    Take 10 mg by mouth.    FERROUS SULFATE (FEOSOL) 325 MG (65 MG IRON) TAB TABLET    Take 1 tablet (325 mg total) by mouth 2 (two) times daily.    HYDROCOLLOID DRESSING (DUODERM HYDROACTIVE) 4 X 4 " BNDG    Apply decubitus q 3 days    LISINOPRIL (PRINIVIL,ZESTRIL) 20 MG TABLET    Take 1 tablet (20 mg total) by mouth once daily.    OXYBUTYNIN (DITROPAN-XL) 10 MG 24 HR TABLET    Take 1 tablet (10 mg total) by mouth once daily.    QUETIAPINE (SEROQUEL) 25 MG TAB    Take 25 mg by mouth every evening.   Changed and/or Refilled " Medications    Modified Medication Previous Medication    ASPIRIN (ECOTRIN) 81 MG EC TABLET aspirin (ECOTRIN) 81 MG EC tablet       Take 1 tablet (81 mg total) by mouth once daily.    Take 1 tablet (81 mg total) by mouth once daily.    LEVOTHYROXINE (SYNTHROID) 125 MCG TABLET levothyroxine (SYNTHROID) 125 MCG tablet       Take 1 tablet (125 mcg total) by mouth before breakfast.    Take 1 tablet (125 mcg total) by mouth before breakfast.   Discontinued Medications    MIRTAZAPINE (REMERON) 15 MG TABLET    Take 1 tablet (15 mg total) by mouth every evening.

## 2023-06-19 ENCOUNTER — TELEPHONE (OUTPATIENT)
Dept: ORTHOPEDICS | Facility: CLINIC | Age: 83
End: 2023-06-19
Payer: MEDICARE

## 2023-06-19 NOTE — TELEPHONE ENCOUNTER
----- Message from Aydee Cortez sent at 6/19/2023  2:04 PM CDT -----   Hematoma of left knee region [S80.02XA] has a urgent referral in please call patient back to schedule appointment

## 2023-06-26 NOTE — PATIENT INSTRUCTIONS
A blood test if not better   X-ray today abdominal ultrasound tomorrow  UA normal  
25-Jun-2023 06:16

## 2023-06-29 ENCOUNTER — TELEPHONE (OUTPATIENT)
Dept: PRIMARY CARE CLINIC | Facility: CLINIC | Age: 83
End: 2023-06-29
Payer: MEDICARE

## 2023-06-29 NOTE — TELEPHONE ENCOUNTER
Called Fouzia to inform her that it is best to go to the ER. Patient daughter verbalized understanding.

## 2023-06-29 NOTE — TELEPHONE ENCOUNTER
----- Message from Marjorie Knott sent at 6/29/2023  9:48 AM CDT -----  Contact: Fouzia (daughter) [phone]  Pt requesting a call advice in regards vomiting and diarrhea.    Please call and advise

## 2023-07-15 PROBLEM — R19.7 DIARRHEA: Status: ACTIVE | Noted: 2023-07-15

## 2023-07-15 PROBLEM — F03.90 DEMENTIA WITHOUT BEHAVIORAL DISTURBANCE, PSYCHOTIC DISTURBANCE, MOOD DISTURBANCE, OR ANXIETY: Status: ACTIVE | Noted: 2023-07-15

## 2023-07-18 ENCOUNTER — TELEPHONE (OUTPATIENT)
Dept: PRIMARY CARE CLINIC | Facility: CLINIC | Age: 83
End: 2023-07-18
Payer: MEDICARE

## 2023-07-18 NOTE — TELEPHONE ENCOUNTER
----- Message from Zenobia Aj sent at 7/18/2023  1:50 PM CDT -----  Regarding: Post ED visit follow up appt within 7 days of d/c date 7/15/23  Good afternoon: Pt was seen in ED on 7/15/23 and requires a Post ED visit follow up appt within 7 days of d/c date. Please contact pt to schedule a fu appt by 7/22/23 if possible.    Thank you  Zenobia Aj

## 2023-07-20 ENCOUNTER — OFFICE VISIT (OUTPATIENT)
Dept: ORTHOPEDICS | Facility: CLINIC | Age: 83
End: 2023-07-20
Payer: MEDICARE

## 2023-07-20 ENCOUNTER — OFFICE VISIT (OUTPATIENT)
Dept: PRIMARY CARE CLINIC | Facility: CLINIC | Age: 83
End: 2023-07-20
Payer: MEDICARE

## 2023-07-20 VITALS
WEIGHT: 130.06 LBS | BODY MASS INDEX: 22.94 KG/M2 | TEMPERATURE: 97 F | SYSTOLIC BLOOD PRESSURE: 126 MMHG | HEART RATE: 65 BPM | HEIGHT: 65 IN | HEART RATE: 70 BPM | WEIGHT: 137.69 LBS | SYSTOLIC BLOOD PRESSURE: 120 MMHG | RESPIRATION RATE: 18 BRPM | DIASTOLIC BLOOD PRESSURE: 77 MMHG | DIASTOLIC BLOOD PRESSURE: 70 MMHG | OXYGEN SATURATION: 96 % | HEIGHT: 65 IN | BODY MASS INDEX: 21.67 KG/M2

## 2023-07-20 DIAGNOSIS — R19.7 DIARRHEA, UNSPECIFIED TYPE: Primary | ICD-10-CM

## 2023-07-20 DIAGNOSIS — I69.154 HEMIPARESIS OF LEFT NONDOMINANT SIDE AS LATE EFFECT OF NONTRAUMATIC INTRACEREBRAL HEMORRHAGE: ICD-10-CM

## 2023-07-20 DIAGNOSIS — N18.31 CHRONIC KIDNEY DISEASE, STAGE 3A: ICD-10-CM

## 2023-07-20 DIAGNOSIS — F01.B0 MODERATE VASCULAR DEMENTIA WITHOUT BEHAVIORAL DISTURBANCE, PSYCHOTIC DISTURBANCE, MOOD DISTURBANCE, OR ANXIETY: ICD-10-CM

## 2023-07-20 DIAGNOSIS — G47.00 INSOMNIA, UNSPECIFIED TYPE: ICD-10-CM

## 2023-07-20 DIAGNOSIS — M70.52 INFRAPATELLAR BURSITIS OF LEFT KNEE: Primary | ICD-10-CM

## 2023-07-20 DIAGNOSIS — D69.6 THROMBOCYTOPENIA: ICD-10-CM

## 2023-07-20 DIAGNOSIS — S80.02XA TRAUMATIC HEMATOMA OF LEFT KNEE, INITIAL ENCOUNTER: ICD-10-CM

## 2023-07-20 DIAGNOSIS — I10 ESSENTIAL HYPERTENSION: ICD-10-CM

## 2023-07-20 PROCEDURE — 99203 PR OFFICE/OUTPT VISIT, NEW, LEVL III, 30-44 MIN: ICD-10-PCS | Mod: 25,S$GLB,,

## 2023-07-20 PROCEDURE — 99214 PR OFFICE/OUTPT VISIT, EST, LEVL IV, 30-39 MIN: ICD-10-PCS | Mod: S$GLB,,, | Performed by: INTERNAL MEDICINE

## 2023-07-20 PROCEDURE — 1160F PR REVIEW ALL MEDS BY PRESCRIBER/CLIN PHARMACIST DOCUMENTED: ICD-10-PCS | Mod: CPTII,S$GLB,, | Performed by: INTERNAL MEDICINE

## 2023-07-20 PROCEDURE — 3074F SYST BP LT 130 MM HG: CPT | Mod: CPTII,S$GLB,,

## 2023-07-20 PROCEDURE — 1101F PT FALLS ASSESS-DOCD LE1/YR: CPT | Mod: CPTII,S$GLB,, | Performed by: INTERNAL MEDICINE

## 2023-07-20 PROCEDURE — 99203 OFFICE O/P NEW LOW 30 MIN: CPT | Mod: 25,S$GLB,,

## 2023-07-20 PROCEDURE — 99214 OFFICE O/P EST MOD 30 MIN: CPT | Mod: S$GLB,,, | Performed by: INTERNAL MEDICINE

## 2023-07-20 PROCEDURE — 99499 UNLISTED E&M SERVICE: CPT | Mod: S$GLB,,, | Performed by: INTERNAL MEDICINE

## 2023-07-20 PROCEDURE — 1159F PR MEDICATION LIST DOCUMENTED IN MEDICAL RECORD: ICD-10-PCS | Mod: CPTII,S$GLB,, | Performed by: INTERNAL MEDICINE

## 2023-07-20 PROCEDURE — 3078F PR MOST RECENT DIASTOLIC BLOOD PRESSURE < 80 MM HG: ICD-10-PCS | Mod: CPTII,S$GLB,, | Performed by: INTERNAL MEDICINE

## 2023-07-20 PROCEDURE — 99999 PR PBB SHADOW E&M-EST. PATIENT-LVL IV: ICD-10-PCS | Mod: PBBFAC,,, | Performed by: INTERNAL MEDICINE

## 2023-07-20 PROCEDURE — 20610 LARGE JOINT ASPIRATION/INJECTION: L KNEE: ICD-10-PCS | Mod: LT,S$GLB,,

## 2023-07-20 PROCEDURE — 1126F AMNT PAIN NOTED NONE PRSNT: CPT | Mod: CPTII,S$GLB,, | Performed by: INTERNAL MEDICINE

## 2023-07-20 PROCEDURE — 3074F SYST BP LT 130 MM HG: CPT | Mod: CPTII,S$GLB,, | Performed by: INTERNAL MEDICINE

## 2023-07-20 PROCEDURE — 99999 PR PBB SHADOW E&M-EST. PATIENT-LVL III: CPT | Mod: PBBFAC,,,

## 2023-07-20 PROCEDURE — 3074F PR MOST RECENT SYSTOLIC BLOOD PRESSURE < 130 MM HG: ICD-10-PCS | Mod: CPTII,S$GLB,, | Performed by: INTERNAL MEDICINE

## 2023-07-20 PROCEDURE — 3078F DIAST BP <80 MM HG: CPT | Mod: CPTII,S$GLB,,

## 2023-07-20 PROCEDURE — 1160F RVW MEDS BY RX/DR IN RCRD: CPT | Mod: CPTII,S$GLB,, | Performed by: INTERNAL MEDICINE

## 2023-07-20 PROCEDURE — 3288F FALL RISK ASSESSMENT DOCD: CPT | Mod: CPTII,S$GLB,, | Performed by: INTERNAL MEDICINE

## 2023-07-20 PROCEDURE — 1126F PR PAIN SEVERITY QUANTIFIED, NO PAIN PRESENT: ICD-10-PCS | Mod: CPTII,S$GLB,, | Performed by: INTERNAL MEDICINE

## 2023-07-20 PROCEDURE — 1126F PR PAIN SEVERITY QUANTIFIED, NO PAIN PRESENT: ICD-10-PCS | Mod: CPTII,S$GLB,,

## 2023-07-20 PROCEDURE — 99999 PR PBB SHADOW E&M-EST. PATIENT-LVL IV: CPT | Mod: PBBFAC,,, | Performed by: INTERNAL MEDICINE

## 2023-07-20 PROCEDURE — 1159F MED LIST DOCD IN RCRD: CPT | Mod: CPTII,S$GLB,, | Performed by: INTERNAL MEDICINE

## 2023-07-20 PROCEDURE — 99999 PR PBB SHADOW E&M-EST. PATIENT-LVL III: ICD-10-PCS | Mod: PBBFAC,,,

## 2023-07-20 PROCEDURE — 1126F AMNT PAIN NOTED NONE PRSNT: CPT | Mod: CPTII,S$GLB,,

## 2023-07-20 PROCEDURE — 3288F PR FALLS RISK ASSESSMENT DOCUMENTED: ICD-10-PCS | Mod: CPTII,S$GLB,, | Performed by: INTERNAL MEDICINE

## 2023-07-20 PROCEDURE — 20610 DRAIN/INJ JOINT/BURSA W/O US: CPT | Mod: LT,S$GLB,,

## 2023-07-20 PROCEDURE — 3078F PR MOST RECENT DIASTOLIC BLOOD PRESSURE < 80 MM HG: ICD-10-PCS | Mod: CPTII,S$GLB,,

## 2023-07-20 PROCEDURE — 3074F PR MOST RECENT SYSTOLIC BLOOD PRESSURE < 130 MM HG: ICD-10-PCS | Mod: CPTII,S$GLB,,

## 2023-07-20 PROCEDURE — 1101F PR PT FALLS ASSESS DOC 0-1 FALLS W/OUT INJ PAST YR: ICD-10-PCS | Mod: CPTII,S$GLB,, | Performed by: INTERNAL MEDICINE

## 2023-07-20 PROCEDURE — 3078F DIAST BP <80 MM HG: CPT | Mod: CPTII,S$GLB,, | Performed by: INTERNAL MEDICINE

## 2023-07-20 RX ORDER — DIPHENOXYLATE HYDROCHLORIDE AND ATROPINE SULFATE 2.5; .025 MG/1; MG/1
1 TABLET ORAL 2 TIMES DAILY PRN
Qty: 30 TABLET | Refills: 0 | Status: SHIPPED | OUTPATIENT
Start: 2023-07-20 | End: 2023-08-11 | Stop reason: SDUPTHER

## 2023-07-20 RX ORDER — QUETIAPINE FUMARATE 50 MG/1
50 TABLET, FILM COATED ORAL NIGHTLY
Qty: 30 TABLET | Refills: 3 | Status: SHIPPED | OUTPATIENT
Start: 2023-07-20 | End: 2023-08-11 | Stop reason: SDUPTHER

## 2023-07-20 NOTE — PROGRESS NOTES
Subjective:       Patient ID: Hever Reyes Jr. is a 82 y.o. male.    Chief Complaint: Follow-up (ER follow up)    HPI  Pt with h/o CVA hemipareses dementia HTN  HLP BPH OA hyperthyroidism pt visit today for ER f/u he was seen in ER for diarrhea with dehydration elevated creatinine he was given IVF in ER and send home he has dementis wheel chair bounced and being taken care off by daughter and son in law his duaghter reort pt has been having loose stool but no blood or mucous no abd pain no n/v she ahs been trying OTC meds for diarrhea but helping no sob cp he has hematoma left knee several weeks on blood thinner seen by surgery try conservative tx first pt has been trying OTC meds for diarrhea without any improvement His daughter also report insomnia cant sleep at night and agittaed sometime po diet good  Review of Systems   Constitutional:  Negative for unexpected weight change.   Eyes:  Negative for redness.   Respiratory:  Negative for shortness of breath.    Cardiovascular:  Negative for chest pain.   Gastrointestinal:  Positive for abdominal pain.   Musculoskeletal:  Positive for arthralgias.   Psychiatric/Behavioral:  Positive for dysphoric mood.      Objective:      Physical Exam  Vitals and nursing note reviewed.   Constitutional:       General: He is not in acute distress.     Appearance: He is well-developed.      Comments: In wheel chair follow verbal commands well hard of hearing no sob cp SANTOS   HENT:      Head: Normocephalic and atraumatic.      Right Ear: External ear normal.      Left Ear: External ear normal.      Nose: Nose normal.      Mouth/Throat:      Pharynx: No oropharyngeal exudate.   Eyes:      Extraocular Movements: Extraocular movements intact.      Conjunctiva/sclera: Conjunctivae normal.      Pupils: Pupils are equal, round, and reactive to light.   Neck:      Thyroid: No thyromegaly.   Cardiovascular:      Rate and Rhythm: Normal rate and regular rhythm.      Heart sounds: Normal  heart sounds. No murmur heard.    No friction rub. No gallop.   Pulmonary:      Effort: Pulmonary effort is normal. No respiratory distress.      Breath sounds: Normal breath sounds. No wheezing or rales.   Abdominal:      General: Bowel sounds are normal. There is no distension.      Palpations: Abdomen is soft.      Tenderness: There is no abdominal tenderness.   Musculoskeletal:         General: No tenderness or deformity. Normal range of motion.      Cervical back: Normal range of motion and neck supple.   Lymphadenopathy:      Cervical: No cervical adenopathy.   Skin:     General: Skin is warm and dry.      Findings: No erythema or rash.   Neurological:      Mental Status: He is alert.      Comments: Rt hemipareses quit follow verbal cammand but no spontaneous conversation       Assessment:       1. Diarrhea, unspecified type    2. Insomnia, unspecified type    3. Thrombocytopenia    4. 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 Essential hypertension    7. Traumatic hematoma of left knee, initial encounter    8. Chronic kidney disease, stage 3a        Plan:       Diarrhea, unspecified type  -     diphenoxylate-atropine 2.5-0.025 mg (LOMOTIL) 2.5-0.025 mg per tablet; Take 1 tablet by mouth 2 (two) times daily as needed for Diarrhea.  Dispense: 30 tablet; Refill: 0    Insomnia, unspecified type  -     QUEtiapine (SEROQUEL) 50 MG tablet; Take 1 tablet (50 mg total) by mouth every evening.  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, psychotic disturbance, mood disturbance, or anxiety  Comments:  will try seroquell for insomnia    Essential hypertension  Comments:  BP stable no  "change in treatment    Traumatic hematoma of left knee, initial encounter  Comments:  has been evaluate by carrie  want continue conservative tx ma need surgery if not better or worse    Chronic kidney disease, stage 3a  Comments:  stable try increase po fluid intake        Medication List with Changes/Refills   New Medications    DIPHENOXYLATE-ATROPINE 2.5-0.025 MG (LOMOTIL) 2.5-0.025 MG PER TABLET    Take 1 tablet by mouth 2 (two) times daily as needed for Diarrhea.    QUETIAPINE (SEROQUEL) 50 MG TABLET    Take 1 tablet (50 mg total) by mouth every evening.   Current Medications    ASPIRIN (ECOTRIN) 81 MG EC TABLET    Take 1 tablet (81 mg total) by mouth once daily.    ATORVASTATIN (LIPITOR) 80 MG TABLET    Take 80 mg by mouth.    CLOPIDOGREL (PLAVIX) 75 MG TABLET    Take 1 tablet (75 mg total) by mouth once daily.    DONEPEZIL (ARICEPT) 10 MG TABLET    Take 10 mg by mouth.    ESCITALOPRAM OXALATE (LEXAPRO) 10 MG TABLET    Take 10 mg by mouth.    FERROUS SULFATE (FEOSOL) 325 MG (65 MG IRON) TAB TABLET    Take 1 tablet (325 mg total) by mouth 2 (two) times daily.    HYDROCOLLOID DRESSING (DUODERM HYDROACTIVE) 4 X 4 " BNDG    Apply decubitus q 3 days    LEVOTHYROXINE (SYNTHROID) 125 MCG TABLET    Take 1 tablet (125 mcg total) by mouth before breakfast.    LISINOPRIL (PRINIVIL,ZESTRIL) 20 MG TABLET    Take 1 tablet (20 mg total) by mouth once daily.    OXYBUTYNIN (DITROPAN-XL) 10 MG 24 HR TABLET    Take 1 tablet (10 mg total) by mouth once daily.   Discontinued Medications    QUETIAPINE (SEROQUEL) 25 MG TAB    Take 25 mg by mouth every evening.        "

## 2023-07-20 NOTE — PROCEDURES
Large Joint Aspiration/Injection: L knee    Date/Time: 7/20/2023 1:00 PM  Performed by: Mindi Watler PA-C  Authorized by: Mindi Walter PA-C     Consent Done?:  Yes (Verbal)  Indications:  Joint swelling  Site marked: the procedure site was marked    Timeout: prior to procedure the correct patient, procedure, and site was verified    Prep: patient was prepped and draped in usual sterile fashion      Local anesthesia used?: Yes    Local anesthetic:  Topical anesthetic    Details:  Needle Size:  18 G  Ultrasonic Guidance for needle placement?: No    Approach: inferior, bursa aspiration.  Location:  Knee  Site:  L knee  Aspirate amount (mL):  2  Aspirate:  Blood-tinged and bloody  Patient tolerance:  Patient tolerated the procedure well with no immediate complications     Aspirated 2 mL of bloody fluid. Ace wrap applied. Pt tolerated well.

## 2023-07-20 NOTE — PROGRESS NOTES
Patient ID: Hever Reyes Jr. is a 82 y.o. male    Swelling and Pain of the Left Knee      History of Present Illness:    Hever Reyes Jr. With PMHx of dementia and hemiparalysis of left side presents to clinic for left knee pain. Patient poor historian, hx obtained from daughter. Reports insidious onset of left knee mass over 1 year ago. Mass located to inferior patella. Denies drainage or infectious signs. Had it drained by PCP 2x, but returned. Not causing patient any pain. Reports he was an  and kneeled throughout his life.     Ambulating: wheelchair  Diabetic: no  Smoking: no  Hx of DVT/PE: no    PAST MEDICAL HISTORY:   Past Medical History:   Diagnosis Date    Anemia     BPH (benign prostatic hyperplasia)     Coronary artery disease     GERD (gastroesophageal reflux disease)     HLD (hyperlipidemia)     Hypertension     Hyperthyroidism     Nephrolithiasis     Nontraumatic subcortical hemorrhage of right cerebral hemisphere 12/9/2020    Osteoarthritis     Sciatica     Sinusitis      PAST SURGICAL HISTORY:   Past Surgical History:   Procedure Laterality Date    CATARACT EXTRACTION      CORONARY ANGIOPLASTY WITH STENT PLACEMENT      LAPAROSCOPIC CHOLECYSTECTOMY N/A 5/6/2019    Procedure: CHOLECYSTECTOMY, LAPAROSCOPIC;  Surgeon: Liam Ordonez MD;  Location: [facility];  Service: General;  Laterality: N/A;  Curse video confirmed 5/3/dme    PROSTATE BIOPSY       FAMILY HISTORY:   Family History   Family history unknown: Yes     SOCIAL HISTORY:   Social History     Occupational History    Not on file   Tobacco Use    Smoking status: Never    Smokeless tobacco: Never   Substance and Sexual Activity    Alcohol use: No    Drug use: No    Sexual activity: Not on file        MEDICATIONS:   Current Outpatient Medications:     aspirin (ECOTRIN) 81 MG EC tablet, Take 1 tablet (81 mg total) by mouth once daily., Disp: 90 tablet, Rfl: 3    atorvastatin (LIPITOR) 80 MG tablet, Take 80 mg by mouth., Disp:  ", Rfl:     clopidogreL (PLAVIX) 75 mg tablet, Take 1 tablet (75 mg total) by mouth once daily., Disp: 30 tablet, Rfl: 1    donepeziL (ARICEPT) 10 MG tablet, Take 10 mg by mouth., Disp: , Rfl:     EScitalopram oxalate (LEXAPRO) 10 MG tablet, Take 10 mg by mouth., Disp: , Rfl:     ferrous sulfate (FEOSOL) 325 mg (65 mg iron) Tab tablet, Take 1 tablet (325 mg total) by mouth 2 (two) times daily., Disp: 60 tablet, Rfl: 3    hydrocolloid dressing (DUODERM HYDROACTIVE) 4 X 4 " Bndg, Apply decubitus q 3 days, Disp: 10 each, Rfl: 1    levothyroxine (SYNTHROID) 125 MCG tablet, Take 1 tablet (125 mcg total) by mouth before breakfast., Disp: 90 tablet, Rfl: 3    lisinopriL (PRINIVIL,ZESTRIL) 20 MG tablet, Take 1 tablet (20 mg total) by mouth once daily., Disp: 90 tablet, Rfl: 3    oxybutynin (DITROPAN-XL) 10 MG 24 hr tablet, Take 1 tablet (10 mg total) by mouth once daily., Disp: 90 tablet, Rfl: 1  ALLERGIES: Review of patient's allergies indicates:  No Known Allergies      Physical Exam     Vitals:    07/20/23 1320   BP: 126/77   Pulse: 70     Alert and oriented to person, place and time. No acute distress. Well-groomed, not ill appearing. Pupils round and reactive, normal respiratory effort, no audible wheezing.     GENERAL:  A well-developed, well-nourished 82 y.o. male who is alert and       oriented in no acute distress.      Gait: wheelchair                 EXTREMITIES:  mass on left knee    Left knee:  ROM 0-90    Ligamentously stable to varus/valgus stress.    Anterior and posterior drawers negative.    No pain over pes bursa.    No warmth    No erythema     Effusion No    medial joint line tenderness    Golf ball sized mass to infrapatellar bursa, no infectious signs, mobile, non tender       The skin over both lower extremities is normal and unremarkable.  He has a  painless range of motion of the hips and ankles bilaterally.   Sensation is intact in both lower extremities.    There are no motor deficits in the lower " extremities bilaterally.   Pedal pulses are palpable distally bilaterally.    He has no calf tenderness to palpation nor edema.    Assessment & Plan    Infrapatellar bursitis of left knee  -     Large Joint Aspiration/Injection: L knee         I made the decision to obtain old records of the patient including previous notes and imaging. New imaging was ordered today of the extremity or extremities evaluated. I independently reviewed and interpreted the radiographs and/or MRIs/CT scan today as well as prior imaging.    We discussed at length different treatment options including conservative vs surgical management. These include anti-inflammatories, acetaminophen, rest, ice, heat, formal physical therapy including strengthening and stretching exercises, home exercise programs, injections, dry needling, and finally surgical intervention.      Attempted aspiration of left knee infrapatellar bursa which revealed 2 mL of bloody fluid.  Discussed with patient and daughter the due to patient's current health status would not recommend undergoing anesthesia for this to which the daughter agreed.  ER precautions given if concern arises for infection.  No concern today.  Ace wrap was given, recommended compression and ice as needed.  Follow up p.r.n..    Follow up: prn    All questions were answered and patient is agreeable to the above plan.

## 2023-07-26 ENCOUNTER — TELEPHONE (OUTPATIENT)
Dept: PRIMARY CARE CLINIC | Facility: CLINIC | Age: 83
End: 2023-07-26
Payer: MEDICARE

## 2023-07-26 NOTE — TELEPHONE ENCOUNTER
----- Message from Jaycee Del Cid MA sent at 7/17/2023  4:21 PM CDT -----  Regarding: FW: Post ED visit within 7 days of d/c date 7/15/23    ----- Message -----  From: Zenobia Aj  Sent: 7/17/2023   1:42 PM CDT  To: Deven Kathleen Staff  Subject: Post ED visit within 7 days of d/c date 7/15#    Good afternoon: Pt was seen in ED on 7/15/23 and requires a Post ED visit follow up appt within 7 days of d/c date. Please contact pt to schedule a follow up appt by 7/22/23 if possible.      Thank you  Zenobia Aj

## 2023-07-27 PROBLEM — R55 NEAR SYNCOPE: Status: ACTIVE | Noted: 2023-07-27

## 2023-07-27 PROBLEM — E87.6 HYPOKALEMIA: Status: ACTIVE | Noted: 2023-07-27

## 2023-07-27 PROBLEM — E83.42 HYPOMAGNESEMIA: Status: ACTIVE | Noted: 2023-07-27

## 2023-07-27 PROBLEM — I95.2 HYPOTENSION DUE TO MEDICATION: Status: ACTIVE | Noted: 2023-07-27

## 2023-08-01 ENCOUNTER — TELEPHONE (OUTPATIENT)
Dept: PRIMARY CARE CLINIC | Facility: CLINIC | Age: 83
End: 2023-08-01
Payer: MEDICARE

## 2023-08-01 ENCOUNTER — TELEPHONE (OUTPATIENT)
Dept: PRIMARY CARE CLINIC | Facility: CLINIC | Age: 83
End: 2023-08-01

## 2023-08-01 NOTE — TELEPHONE ENCOUNTER
----- Message from Jaycee Del Cid MA sent at 7/31/2023  4:21 PM CDT -----  Contact: Jackelin/ Fouzia [phone]    ----- Message -----  From: Guanaco Nichols  Sent: 7/31/2023   4:05 PM CDT  To: Deven Kathleen Staff    Caller is requesting an earlier appointment then we can schedule.  Caller is requesting a message be sent to the provider.    When is the next available appointment with their provider:  11/26/23  Reason for the appointment:  ER 7/27/23 visit follow up

## 2023-08-01 NOTE — TELEPHONE ENCOUNTER
----- Message from Brit Kwan sent at 8/1/2023  3:32 PM CDT -----  Contact: [phone] Patient  Pts daughter is calling in regards to needing a letter from Dr Carvalho stating that the pt can not write letters or sign paperwork since having his stroke. Pts daughter would like to pick it up tomorrow morning please if possible. 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 Please call and advise.

## 2023-08-03 ENCOUNTER — TELEPHONE (OUTPATIENT)
Dept: PRIMARY CARE CLINIC | Facility: CLINIC | Age: 83
End: 2023-08-03
Payer: MEDICARE

## 2023-08-03 NOTE — LETTER
August 3, 2023      [facility] [address], [facility] [address]  [facility] [address]  Phone: [phone]  Fax: [phone]       Patient: Hever Reyes   YOB: 1940      To Whom It May Concern:    Malka Reyes  is currently a patient at [facility]. Due to patient having dementia and strokes the patient is unable to write. If you have any questions or concerns, or if I can be of further assistance, please do not hesitate to contact me.    Sincerely,        Hever Carvalho MD     
all other ROS negative except as per HPI

## 2023-08-03 NOTE — TELEPHONE ENCOUNTER
----- Message from Khushbu Rodriguez sent at 8/3/2023  9:51 AM CDT -----  Contact: Fouzia/daughter/[phone]  Fouzia called, to find out if letter is ready for . Unsure on why is taking long for letter to be fill out. Please advise. Thank you.

## 2023-08-11 ENCOUNTER — OFFICE VISIT (OUTPATIENT)
Dept: PRIMARY CARE CLINIC | Facility: CLINIC | Age: 83
End: 2023-08-11
Payer: MEDICARE

## 2023-08-11 VITALS
DIASTOLIC BLOOD PRESSURE: 77 MMHG | HEIGHT: 65 IN | OXYGEN SATURATION: 96 % | BODY MASS INDEX: 23.86 KG/M2 | RESPIRATION RATE: 18 BRPM | SYSTOLIC BLOOD PRESSURE: 137 MMHG | WEIGHT: 143.19 LBS | HEART RATE: 77 BPM

## 2023-08-11 DIAGNOSIS — R19.7 DIARRHEA, UNSPECIFIED TYPE: ICD-10-CM

## 2023-08-11 DIAGNOSIS — I69.154 HEMIPARESIS OF LEFT NONDOMINANT SIDE AS LATE EFFECT OF NONTRAUMATIC INTRACEREBRAL HEMORRHAGE: ICD-10-CM

## 2023-08-11 DIAGNOSIS — I25.119 ATHEROSCLEROSIS OF NATIVE CORONARY ARTERY OF NATIVE HEART WITH ANGINA PECTORIS: ICD-10-CM

## 2023-08-11 DIAGNOSIS — E87.6 HYPOKALEMIA: Primary | ICD-10-CM

## 2023-08-11 DIAGNOSIS — R55 SYNCOPE, UNSPECIFIED SYNCOPE TYPE: ICD-10-CM

## 2023-08-11 DIAGNOSIS — D69.6 THROMBOCYTOPENIA: ICD-10-CM

## 2023-08-11 DIAGNOSIS — G47.00 INSOMNIA, UNSPECIFIED TYPE: ICD-10-CM

## 2023-08-11 DIAGNOSIS — D64.9 ANEMIA, UNSPECIFIED TYPE: ICD-10-CM

## 2023-08-11 DIAGNOSIS — E03.9 HYPOTHYROIDISM, UNSPECIFIED TYPE: ICD-10-CM

## 2023-08-11 DIAGNOSIS — N32.81 OAB (OVERACTIVE BLADDER): ICD-10-CM

## 2023-08-11 DIAGNOSIS — F03.B18 MODERATE DEMENTIA WITH OTHER BEHAVIORAL DISTURBANCE, UNSPECIFIED DEMENTIA TYPE: ICD-10-CM

## 2023-08-11 PROCEDURE — 1100F PR PT FALLS ASSESS DOC 2+ FALLS/FALL W/INJURY/YR: ICD-10-PCS | Mod: CPTII,S$GLB,, | Performed by: INTERNAL MEDICINE

## 2023-08-11 PROCEDURE — 3078F DIAST BP <80 MM HG: CPT | Mod: CPTII,S$GLB,, | Performed by: INTERNAL MEDICINE

## 2023-08-11 PROCEDURE — 3075F PR MOST RECENT SYSTOLIC BLOOD PRESS GE 130-139MM HG: ICD-10-PCS | Mod: CPTII,S$GLB,, | Performed by: INTERNAL MEDICINE

## 2023-08-11 PROCEDURE — 3288F FALL RISK ASSESSMENT DOCD: CPT | Mod: CPTII,S$GLB,, | Performed by: INTERNAL MEDICINE

## 2023-08-11 PROCEDURE — 1100F PTFALLS ASSESS-DOCD GE2>/YR: CPT | Mod: CPTII,S$GLB,, | Performed by: INTERNAL MEDICINE

## 2023-08-11 PROCEDURE — 3288F PR FALLS RISK ASSESSMENT DOCUMENTED: ICD-10-PCS | Mod: CPTII,S$GLB,, | Performed by: INTERNAL MEDICINE

## 2023-08-11 PROCEDURE — 1160F RVW MEDS BY RX/DR IN RCRD: CPT | Mod: CPTII,S$GLB,, | Performed by: INTERNAL MEDICINE

## 2023-08-11 PROCEDURE — 1159F PR MEDICATION LIST DOCUMENTED IN MEDICAL RECORD: ICD-10-PCS | Mod: CPTII,S$GLB,, | Performed by: INTERNAL MEDICINE

## 2023-08-11 PROCEDURE — 1160F PR REVIEW ALL MEDS BY PRESCRIBER/CLIN PHARMACIST DOCUMENTED: ICD-10-PCS | Mod: CPTII,S$GLB,, | Performed by: INTERNAL MEDICINE

## 2023-08-11 PROCEDURE — 99214 PR OFFICE/OUTPT VISIT, EST, LEVL IV, 30-39 MIN: ICD-10-PCS | Mod: S$GLB,,, | Performed by: INTERNAL MEDICINE

## 2023-08-11 PROCEDURE — 99999 PR PBB SHADOW E&M-EST. PATIENT-LVL III: ICD-10-PCS | Mod: PBBFAC,,, | Performed by: INTERNAL MEDICINE

## 2023-08-11 PROCEDURE — 99214 OFFICE O/P EST MOD 30 MIN: CPT | Mod: S$GLB,,, | Performed by: INTERNAL MEDICINE

## 2023-08-11 PROCEDURE — 3078F PR MOST RECENT DIASTOLIC BLOOD PRESSURE < 80 MM HG: ICD-10-PCS | Mod: CPTII,S$GLB,, | Performed by: INTERNAL MEDICINE

## 2023-08-11 PROCEDURE — 1159F MED LIST DOCD IN RCRD: CPT | Mod: CPTII,S$GLB,, | Performed by: INTERNAL MEDICINE

## 2023-08-11 PROCEDURE — 3075F SYST BP GE 130 - 139MM HG: CPT | Mod: CPTII,S$GLB,, | Performed by: INTERNAL MEDICINE

## 2023-08-11 PROCEDURE — 99999 PR PBB SHADOW E&M-EST. PATIENT-LVL III: CPT | Mod: PBBFAC,,, | Performed by: INTERNAL MEDICINE

## 2023-08-11 RX ORDER — OXYBUTYNIN CHLORIDE 10 MG/1
10 TABLET, EXTENDED RELEASE ORAL DAILY
Qty: 90 TABLET | Refills: 1 | Status: SHIPPED | OUTPATIENT
Start: 2023-08-11 | End: 2024-01-30 | Stop reason: SDUPTHER

## 2023-08-11 RX ORDER — ASPIRIN 81 MG/1
81 TABLET ORAL DAILY
Qty: 90 TABLET | Refills: 3 | Status: SHIPPED | OUTPATIENT
Start: 2023-08-11 | End: 2024-01-30 | Stop reason: SDUPTHER

## 2023-08-11 RX ORDER — ATORVASTATIN CALCIUM 80 MG/1
80 TABLET, FILM COATED ORAL DAILY
Qty: 90 TABLET | Refills: 0 | Status: SHIPPED | OUTPATIENT
Start: 2023-08-11 | End: 2024-01-30 | Stop reason: SDUPTHER

## 2023-08-11 RX ORDER — ESCITALOPRAM OXALATE 10 MG/1
10 TABLET ORAL DAILY
Qty: 90 TABLET | Refills: 0 | Status: SHIPPED | OUTPATIENT
Start: 2023-08-11 | End: 2024-01-30 | Stop reason: SDUPTHER

## 2023-08-11 RX ORDER — FERROUS SULFATE 325(65) MG
325 TABLET ORAL 2 TIMES DAILY
Qty: 60 TABLET | Refills: 3 | Status: SHIPPED | OUTPATIENT
Start: 2023-08-11 | End: 2024-01-30 | Stop reason: SDUPTHER

## 2023-08-11 RX ORDER — DONEPEZIL HYDROCHLORIDE 10 MG/1
10 TABLET, FILM COATED ORAL NIGHTLY
Qty: 90 TABLET | Refills: 1 | Status: SHIPPED | OUTPATIENT
Start: 2023-08-11 | End: 2024-01-30 | Stop reason: SDUPTHER

## 2023-08-11 RX ORDER — DIPHENOXYLATE HYDROCHLORIDE AND ATROPINE SULFATE 2.5; .025 MG/1; MG/1
1 TABLET ORAL 2 TIMES DAILY PRN
Qty: 30 TABLET | Refills: 0 | Status: SHIPPED | OUTPATIENT
Start: 2023-08-11 | End: 2023-10-19

## 2023-08-11 RX ORDER — LEVOTHYROXINE SODIUM 125 UG/1
125 TABLET ORAL
Qty: 90 TABLET | Refills: 3 | Status: SHIPPED | OUTPATIENT
Start: 2023-08-11 | End: 2024-01-30 | Stop reason: SDUPTHER

## 2023-08-11 RX ORDER — QUETIAPINE FUMARATE 25 MG/1
25 TABLET, FILM COATED ORAL NIGHTLY
Qty: 30 TABLET | Refills: 3 | Status: SHIPPED | OUTPATIENT
Start: 2023-08-11 | End: 2024-01-30 | Stop reason: SDUPTHER

## 2023-08-11 RX ORDER — POTASSIUM CHLORIDE 750 MG/1
10 CAPSULE, EXTENDED RELEASE ORAL DAILY
Qty: 7 CAPSULE | Refills: 0 | Status: SHIPPED | OUTPATIENT
Start: 2023-08-11 | End: 2023-08-18

## 2023-08-15 NOTE — PROGRESS NOTES
Subjective:       Patient ID: Hever Reyes Jr. is a 82 y.o. male.    Chief Complaint: Follow-up (er)    HPI PT visit today for f/u from hospital pt had another epiosde syncope no injury [place] [facility] w/u family report every thing is ok no sob cp SANTOS po diet is good BM is ok pt is wheel chair bounced due to CVA left hemipareses  Review of Systems   Constitutional:  Negative for unexpected weight change.   Respiratory:  Negative for shortness of breath.    Cardiovascular:  Negative for chest pain.   Gastrointestinal:  Negative for abdominal pain.   Musculoskeletal:  Positive for arthralgias.   Neurological:  Positive for weakness.   Psychiatric/Behavioral:  Positive for dysphoric mood.        Objective:      Physical Exam  Vitals and nursing note reviewed.   Constitutional:       General: He is not in acute distress.     Appearance: He is well-developed.   HENT:      Head: Normocephalic and atraumatic.      Right Ear: External ear normal.      Left Ear: External ear normal.      Nose: Nose normal.      Mouth/Throat:      Pharynx: No oropharyngeal exudate.   Eyes:      Conjunctiva/sclera: Conjunctivae normal.      Pupils: Pupils are equal, round, and reactive to light.   Neck:      Thyroid: No thyromegaly.   Cardiovascular:      Rate and Rhythm: Normal rate and regular rhythm.      Heart sounds: Normal heart sounds. No murmur heard.     No friction rub. No gallop.   Pulmonary:      Effort: Pulmonary effort is normal. No respiratory distress.      Breath sounds: Normal breath sounds. No wheezing.   Abdominal:      General: Bowel sounds are normal. There is no distension.      Palpations: Abdomen is soft.      Tenderness: There is no abdominal tenderness.   Musculoskeletal:         General: No tenderness or deformity. Normal range of motion.      Cervical back: Normal range of motion and neck supple.   Lymphadenopathy:      Cervical: No cervical adenopathy.   Skin:     General: Skin is warm and dry.      Findings: No  erythema or rash.   Neurological:      Mental Status: He is alert and oriented to person, place, and time.   Psychiatric:      Comments: Moderate decrease cognitive function wheel chair bounced no verbal response but follow verbal commands         Assessment:       1. Hypokalemia    2. 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 Diarrhea, unspecified type    4. Anemia, unspecified type    5. Hypothyroidism, unspecified type    6. OAB (overactive bladder)    7. Insomnia, unspecified type    8. Moderate dementia with other behavioral disturbance, unspecified dementia type    9. Thrombocytopenia    10. Atherosclerosis of native coronary artery of native heart with angina pectoris    11. 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  Dispense: 90 tablet; Refill: 3    Diarrhea, unspecified type  -     diphenoxylate-atropine 2.5-0.025 mg (LOMOTIL) 2.5-0.025 mg per tablet; Take 1 tablet by mouth 2 (two) times daily as needed for Diarrhea.  Dispense: 30 tablet; Refill: 0    Anemia, unspecified type  -     ferrous sulfate (FEOSOL) 325 mg (65 mg iron) Tab tablet; Take 1 tablet (325 mg total) by mouth 2 (two) times daily.  Dispense: 60 tablet; Refill: 3  -     CBC Auto Differential; Future; Expected date: 08/11/2023  -     Comprehensive Metabolic Panel; Future; Expected date: 08/11/2023  -     Urinalysis; Future; Expected date: 08/11/2023    Hypothyroidism, unspecified type  -     levothyroxine (SYNTHROID) 125 MCG tablet; Take 1 tablet (125 mcg total) by mouth before breakfast.  Dispense: 90 tablet; Refill: 3  -     Lipid Panel; Future; Expected date: 08/11/2023  -     TSH; Future; Expected date: 08/11/2023  -     T4,  "Free; Future; Expected date: 08/11/2023    OAB (overactive bladder)  -     oxybutynin (DITROPAN-XL) 10 MG 24 hr tablet; Take 1 tablet (10 mg total) by mouth once daily.  Dispense: 90 tablet; Refill: 1    Insomnia, unspecified type  -     QUEtiapine (SEROQUEL) 25 MG Tab; Take 1 tablet (25 mg total) by mouth every evening.  Dispense: 30 tablet; Refill: 3    Moderate dementia with other behavioral disturbance, unspecified dementia type  -     donepeziL (ARICEPT) 10 MG tablet; Take 1 tablet (10 mg total) by mouth every evening.  Dispense: 90 tablet; Refill: 1  -     EScitalopram oxalate (LEXAPRO) 10 MG tablet; Take 1 tablet (10 mg total) by mouth once daily.  Dispense: 90 tablet; Refill: 0    Thrombocytopenia    Atherosclerosis of native coronary artery of native heart with angina pectoris  -     atorvastatin (LIPITOR) 80 MG tablet; Take 1 tablet (80 mg total) by mouth once daily.  Dispense: 90 tablet; Refill: 0  -     Lipid Panel; Future; Expected date: 08/11/2023    Syncope, unspecified syncope type  -     US Carotid Bilateral; Future; Expected date: 08/11/2023  -     Holter monitor - 48 hour; Future    Other orders  -     potassium chloride (MICRO-K) 10 MEQ CpSR; Take 1 capsule (10 mEq total) by mouth once daily. for 7 days  Dispense: 7 capsule; Refill: 0        Medication List with Changes/Refills   Current Medications    HYDROCOLLOID DRESSING (DUODERM HYDROACTIVE) 4 X 4 " BNDG    Apply decubitus q 3 days   Changed and/or Refilled Medications    Modified Medication Previous Medication    ASPIRIN (ECOTRIN) 81 MG EC TABLET aspirin (ECOTRIN) 81 MG EC tablet       Take 1 tablet (81 mg total) by mouth once daily.    Take 1 tablet (81 mg total) by mouth once daily.    ATORVASTATIN (LIPITOR) 80 MG TABLET atorvastatin (LIPITOR) 80 MG tablet       Take 1 tablet (80 mg total) by mouth once daily.    Take 80 mg by mouth.    DIPHENOXYLATE-ATROPINE 2.5-0.025 MG (LOMOTIL) 2.5-0.025 MG PER TABLET diphenoxylate-atropine 2.5-0.025 " mg (LOMOTIL) 2.5-0.025 mg per tablet       Take 1 tablet by mouth 2 (two) times daily as needed for Diarrhea.    Take 1 tablet by mouth 2 (two) times daily as needed for Diarrhea.    DONEPEZIL (ARICEPT) 10 MG TABLET donepeziL (ARICEPT) 10 MG tablet       Take 1 tablet (10 mg total) by mouth every evening.    Take 10 mg by mouth.    ESCITALOPRAM OXALATE (LEXAPRO) 10 MG TABLET EScitalopram oxalate (LEXAPRO) 10 MG tablet       Take 1 tablet (10 mg total) by mouth once daily.    Take 10 mg by mouth.    FERROUS SULFATE (FEOSOL) 325 MG (65 MG IRON) TAB TABLET ferrous sulfate (FEOSOL) 325 mg (65 mg iron) Tab tablet       Take 1 tablet (325 mg total) by mouth 2 (two) times daily.    Take 1 tablet (325 mg total) by mouth 2 (two) times daily.    LEVOTHYROXINE (SYNTHROID) 125 MCG TABLET levothyroxine (SYNTHROID) 125 MCG tablet       Take 1 tablet (125 mcg total) by mouth before breakfast.    Take 1 tablet (125 mcg total) by mouth before breakfast.    OXYBUTYNIN (DITROPAN-XL) 10 MG 24 HR TABLET oxybutynin (DITROPAN-XL) 10 MG 24 hr tablet       Take 1 tablet (10 mg total) by mouth once daily.    Take 1 tablet (10 mg total) by mouth once daily.    POTASSIUM CHLORIDE (MICRO-K) 10 MEQ CPSR potassium chloride (MICRO-K) 10 MEQ CpSR       Take 1 capsule (10 mEq total) by mouth once daily. for 7 days    Take 1 capsule (10 mEq total) by mouth once daily. for 7 days    QUETIAPINE (SEROQUEL) 25 MG TAB QUEtiapine (SEROQUEL) 50 MG tablet       Take 1 tablet (25 mg total) by mouth every evening.    Take 1 tablet (50 mg total) by mouth every evening.   Discontinued Medications    CLOPIDOGREL (PLAVIX) 75 MG TABLET    Take 1 tablet (75 mg total) by mouth once daily.

## 2023-10-04 ENCOUNTER — OFFICE VISIT (OUTPATIENT)
Dept: PODIATRY | Facility: CLINIC | Age: 83
End: 2023-10-04
Payer: MEDICARE

## 2023-10-04 VITALS
BODY MASS INDEX: 23.83 KG/M2 | SYSTOLIC BLOOD PRESSURE: 127 MMHG | DIASTOLIC BLOOD PRESSURE: 83 MMHG | HEIGHT: 65 IN | HEART RATE: 76 BPM

## 2023-10-04 DIAGNOSIS — Z79.01 LONG TERM CURRENT USE OF ANTICOAGULANT: ICD-10-CM

## 2023-10-04 DIAGNOSIS — B35.3 TINEA PEDIS OF BOTH FEET: ICD-10-CM

## 2023-10-04 DIAGNOSIS — I69.30 HISTORY OF HEMORRHAGIC CEREBROVASCULAR ACCIDENT (CVA) WITH RESIDUAL DEFICIT: ICD-10-CM

## 2023-10-04 DIAGNOSIS — B35.1 ONYCHOMYCOSIS OF TOENAIL: ICD-10-CM

## 2023-10-04 DIAGNOSIS — R60.9 DEPENDENT EDEMA: ICD-10-CM

## 2023-10-04 DIAGNOSIS — I69.154 HEMIPARESIS OF LEFT NONDOMINANT SIDE AS LATE EFFECT OF NONTRAUMATIC INTRACEREBRAL HEMORRHAGE: ICD-10-CM

## 2023-10-04 DIAGNOSIS — L60.2 ONYCHOGRYPOSIS OF TOENAIL: Primary | ICD-10-CM

## 2023-10-04 PROCEDURE — 99203 PR OFFICE/OUTPT VISIT, NEW, LEVL III, 30-44 MIN: ICD-10-PCS | Mod: 25,S$GLB,, | Performed by: PODIATRIST

## 2023-10-04 PROCEDURE — 3074F PR MOST RECENT SYSTOLIC BLOOD PRESSURE < 130 MM HG: ICD-10-PCS | Mod: CPTII,S$GLB,, | Performed by: PODIATRIST

## 2023-10-04 PROCEDURE — 1126F AMNT PAIN NOTED NONE PRSNT: CPT | Mod: CPTII,S$GLB,, | Performed by: PODIATRIST

## 2023-10-04 PROCEDURE — 1126F PR PAIN SEVERITY QUANTIFIED, NO PAIN PRESENT: ICD-10-PCS | Mod: CPTII,S$GLB,, | Performed by: PODIATRIST

## 2023-10-04 PROCEDURE — 3288F FALL RISK ASSESSMENT DOCD: CPT | Mod: CPTII,S$GLB,, | Performed by: PODIATRIST

## 2023-10-04 PROCEDURE — 11721 DEBRIDE NAIL 6 OR MORE: CPT | Mod: Q9,S$GLB,, | Performed by: PODIATRIST

## 2023-10-04 PROCEDURE — 1100F PR PT FALLS ASSESS DOC 2+ FALLS/FALL W/INJURY/YR: ICD-10-PCS | Mod: CPTII,S$GLB,, | Performed by: PODIATRIST

## 2023-10-04 PROCEDURE — 1100F PTFALLS ASSESS-DOCD GE2>/YR: CPT | Mod: CPTII,S$GLB,, | Performed by: PODIATRIST

## 2023-10-04 PROCEDURE — 1159F PR MEDICATION LIST DOCUMENTED IN MEDICAL RECORD: ICD-10-PCS | Mod: CPTII,S$GLB,, | Performed by: PODIATRIST

## 2023-10-04 PROCEDURE — 99999 PR PBB SHADOW E&M-EST. PATIENT-LVL IV: CPT | Mod: PBBFAC,,, | Performed by: PODIATRIST

## 2023-10-04 PROCEDURE — 99999 PR PBB SHADOW E&M-EST. PATIENT-LVL IV: ICD-10-PCS | Mod: PBBFAC,,, | Performed by: PODIATRIST

## 2023-10-04 PROCEDURE — 3288F PR FALLS RISK ASSESSMENT DOCUMENTED: ICD-10-PCS | Mod: CPTII,S$GLB,, | Performed by: PODIATRIST

## 2023-10-04 PROCEDURE — 11721 PR DEBRIDEMENT OF NAILS, 6 OR MORE: ICD-10-PCS | Mod: Q9,S$GLB,, | Performed by: PODIATRIST

## 2023-10-04 PROCEDURE — 3074F SYST BP LT 130 MM HG: CPT | Mod: CPTII,S$GLB,, | Performed by: PODIATRIST

## 2023-10-04 PROCEDURE — 3079F DIAST BP 80-89 MM HG: CPT | Mod: CPTII,S$GLB,, | Performed by: PODIATRIST

## 2023-10-04 PROCEDURE — 99203 OFFICE O/P NEW LOW 30 MIN: CPT | Mod: 25,S$GLB,, | Performed by: PODIATRIST

## 2023-10-04 PROCEDURE — 3079F PR MOST RECENT DIASTOLIC BLOOD PRESSURE 80-89 MM HG: ICD-10-PCS | Mod: CPTII,S$GLB,, | Performed by: PODIATRIST

## 2023-10-04 PROCEDURE — 1159F MED LIST DOCD IN RCRD: CPT | Mod: CPTII,S$GLB,, | Performed by: PODIATRIST

## 2023-10-04 NOTE — PROGRESS NOTES
Subjective:      Patient ID: Hever Reyes Jr. is a 82 y.o. male.    Chief Complaint: Nail Problem    Hever is a 82 y.o. male who presents new to the clinic, brought in by daughter, for evaluation & tx of high risk feet. The patient's cc is foot fungus & long, thick toenails.     PCP: Hever Carvalho MD    Date Last Seen by PCP: 8/11/23    On mini ASA as has had 4 TIA/ CVA  Past Medical History:   Diagnosis Date    Anemia     BPH (benign prostatic hyperplasia)     Coronary artery disease     GERD (gastroesophageal reflux disease)     HLD (hyperlipidemia)     Hypertension     Hyperthyroidism     Nephrolithiasis     Nontraumatic subcortical hemorrhage of right cerebral hemisphere 12/9/2020    Osteoarthritis     Sciatica     Sinusitis      Patient Active Problem List   Diagnosis    Calculus of gallbladder without cholecystitis without obstruction    Fall due to stumbling    Iron deficiency anemia    Essential hypertension    Hypothyroidism    History of myocardial infarction    Atherosclerosis of native coronary artery of native heart with angina pectoris    BPH (benign prostatic hyperplasia)    Mixed hyperlipidemia    Impaired mobility and ADLs    History of hemorrhagic cerebrovascular accident (CVA) with residual deficit    Hemiparesis of left nondominant 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   Skin:  Positive for color change, dry skin, nail changes and rash. Negative for suspicious lesions.   Musculoskeletal:  Positive for arthritis, back pain, falls, joint pain and muscle  weakness.   Neurological:  Positive for focal weakness and weakness.   Psychiatric/Behavioral:  The patient is not nervous/anxious.      Physical Exam  Vitals reviewed.   Constitutional:       General: He is not in acute distress.     Appearance: Normal appearance. He is normal weight.   Cardiovascular:      Pulses: Normal pulses.           Dorsalis pedis pulses are 2+ on the right side and 2+ on the left side.   Musculoskeletal:      Right lower leg: Edema present.      Left lower leg: Edema present.   Feet:      Right foot:      Skin integrity: Dry skin present.      Toenail Condition: Right toenails are abnormally thick and long. Fungal disease present.     Left foot:      Skin integrity: Dry skin present.      Toenail Condition: Left toenails are abnormally thick and long. Fungal disease present.     Comments: Toenails 1st, 2nd, 3rd, 4th, 5th  B/L are hypertrophic, thickened, dystrophic, discolored, w/ crumbly subungual debris. Tender to distal nail plate pressure, w/out periungual skin abnormality noted.     Skin:     General: Skin is warm and dry.      Capillary Refill: Capillary refill takes 2 to 3 seconds.      Findings: Rash present. No bruising, erythema or lesion. Rash is scaling.   Neurological:      Mental Status: Mental status is at baseline.      Motor: Weakness present.      Gait: Gait abnormal (wheelchair bound).   Psychiatric:         Mood and Affect: Mood is not anxious.         Behavior: Behavior is cooperative.         Cognition and Memory: Cognition is impaired.         Assessment:      Encounter Diagnoses   Name Primary?    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    Diagnoses and all orders for this visit:    Onychogryposis of toenail  -     Nail debridement    Onychomycosis of toenail  -     Ambulatory referral/consult to Podiatry  -     Nail debridement    History of hemorrhagic cerebrovascular accident (CVA) with residual deficit  -     Nail debridement    Hemiparesis of left nondominant side as late effect of nontraumatic intracerebral hemorrhage    Tinea pedis of both feet    Long term current use of anticoagulant  -     Nail debridement    I counseled the patient on his conditions, their implications & medical mgmt.    Patient instructed on proper foot hygeine. We discussed wearing proper protective shoe gear, annual foot exam, sooner prn..      - With patient's permission, nails were aggressively reduced & debrided x 10 to their soft tissue attachment mechanically, removing all offending nail & debris. Patient relates relief following the procedure.    Instructions provided on OTC topical antifungal tx options for nails per request.        A total of 41 mins.was spent on chart review, patient visit & documentation.

## 2023-10-12 NOTE — PROCEDURES
Nail debridement    Date/Time: 10/4/2023 2:00 PM    Performed by: Khushbu Velasquez DPM  Authorized by: Khushbu Velasquez DPM    Consent Done?:  Yes (Verbal)    Nail Care Type:  Debride  Location(s): All  (Left 1st Toe, Left 3rd Toe, Left 2nd Toe, Left 4th Toe, Left 5th Toe, Right 1st Toe, Right 2nd Toe, Right 3rd Toe, Right 4th Toe and Right 5th Toe)  Patient tolerance:  Patient tolerated the procedure well with no immediate complications

## 2023-10-18 DIAGNOSIS — R19.7 DIARRHEA, UNSPECIFIED TYPE: ICD-10-CM

## 2023-10-19 RX ORDER — DIPHENOXYLATE HYDROCHLORIDE AND ATROPINE SULFATE 2.5; .025 MG/1; MG/1
TABLET ORAL
Qty: 30 TABLET | Refills: 0 | Status: SHIPPED | OUTPATIENT
Start: 2023-10-19 | End: 2023-11-22

## 2023-11-06 ENCOUNTER — TELEPHONE (OUTPATIENT)
Dept: PRIMARY CARE CLINIC | Facility: CLINIC | Age: 83
End: 2023-11-06
Payer: MEDICARE

## 2023-11-06 NOTE — TELEPHONE ENCOUNTER
----- Message from Brit Kwan sent at 11/6/2023 12:13 PM CST -----  Contact: [phone] Patient  Caller is requesting an earlier appointment then we can schedule.  Caller is requesting a message be sent to the provider.  If this is for urgent care symptoms, did you offer other providers at this location, providers at other locations, or [facility]? (yes, no, n/a):    If this is for the patients physical, did you offer to schedule next available and put on wait list, or to see NP or PA for their physical?  (yes, no, n/a):    When is the next available appointment with their provider:  03/06/2024  Reason for the appointment:  Med refill  Patient preference of timeframe to be scheduled:  ASAP  Would the patient like a call back, or a response through their [facility] portal?:   Call Back Please. Thank you  Comments:

## 2023-11-21 DIAGNOSIS — R19.7 DIARRHEA, UNSPECIFIED TYPE: ICD-10-CM

## 2023-11-22 RX ORDER — DIPHENOXYLATE HYDROCHLORIDE AND ATROPINE SULFATE 2.5; .025 MG/1; MG/1
TABLET ORAL
Qty: 30 TABLET | Refills: 0 | Status: SHIPPED | OUTPATIENT
Start: 2023-11-22 | End: 2024-01-30 | Stop reason: SDUPTHER

## 2024-01-30 DIAGNOSIS — F03.B18 MODERATE DEMENTIA WITH OTHER BEHAVIORAL DISTURBANCE, UNSPECIFIED DEMENTIA TYPE: ICD-10-CM

## 2024-01-30 DIAGNOSIS — L89.152 PRESSURE INJURY OF SACRAL REGION, STAGE 2: ICD-10-CM

## 2024-01-30 DIAGNOSIS — I69.154 HEMIPARESIS OF LEFT NONDOMINANT SIDE AS LATE EFFECT OF NONTRAUMATIC INTRACEREBRAL HEMORRHAGE: ICD-10-CM

## 2024-01-30 DIAGNOSIS — D64.9 ANEMIA, UNSPECIFIED TYPE: ICD-10-CM

## 2024-01-30 DIAGNOSIS — I25.119 ATHEROSCLEROSIS OF NATIVE CORONARY ARTERY OF NATIVE HEART WITH ANGINA PECTORIS: ICD-10-CM

## 2024-01-30 DIAGNOSIS — R19.7 DIARRHEA, UNSPECIFIED TYPE: ICD-10-CM

## 2024-01-30 DIAGNOSIS — G47.00 INSOMNIA, UNSPECIFIED TYPE: ICD-10-CM

## 2024-01-30 DIAGNOSIS — N32.81 OAB (OVERACTIVE BLADDER): ICD-10-CM

## 2024-01-30 DIAGNOSIS — E03.9 HYPOTHYROIDISM, UNSPECIFIED TYPE: ICD-10-CM

## 2024-01-30 NOTE — TELEPHONE ENCOUNTER
Care Due:                  Date            Visit Type   Department     Provider  --------------------------------------------------------------------------------                                EP -                              PRIMARY      [facility]  Last Visit: 08-      CARE ([facility])   PRIMARY CARE   Hever Carvalho                               -                              PRIMARY      [facility]  Next Visit: 05-      CARE ([facility])   PRIMARY CARE   Hever Carvalho                                                            Last  Test          Frequency    Reason                     Performed    Due Date  --------------------------------------------------------------------------------    Lipid Panel.  12 months..  atorvastatin.............  12- 12-    [facility] Embedded Care Due Messages. Reference number: [id number].   1/30/2024 1:57:54 PM CST

## 2024-01-31 RX ORDER — LEVOTHYROXINE SODIUM 125 UG/1
125 TABLET ORAL
Qty: 90 TABLET | Refills: 3 | Status: SHIPPED | OUTPATIENT
Start: 2024-01-31 | End: 2024-03-19 | Stop reason: SDUPTHER

## 2024-01-31 RX ORDER — DIPHENOXYLATE HYDROCHLORIDE AND ATROPINE SULFATE 2.5; .025 MG/1; MG/1
TABLET ORAL
Qty: 30 TABLET | Refills: 0 | Status: SHIPPED | OUTPATIENT
Start: 2024-01-31 | End: 2024-03-19 | Stop reason: SDUPTHER

## 2024-01-31 RX ORDER — ASPIRIN 81 MG/1
81 TABLET ORAL DAILY
Qty: 90 TABLET | Refills: 3 | Status: SHIPPED | OUTPATIENT
Start: 2024-01-31 | End: 2024-03-19 | Stop reason: SDUPTHER

## 2024-01-31 RX ORDER — ATORVASTATIN CALCIUM 80 MG/1
80 TABLET, FILM COATED ORAL DAILY
Qty: 90 TABLET | Refills: 0 | Status: SHIPPED | OUTPATIENT
Start: 2024-01-31 | End: 2024-03-19 | Stop reason: SDUPTHER

## 2024-01-31 RX ORDER — DRESSING,ODOR ABSORBENT,H-POLY 4" X 4"
BANDAGE MISCELLANEOUS
Qty: 10 EACH | Refills: 1 | Status: SHIPPED | OUTPATIENT
Start: 2024-01-31 | End: 2024-03-19 | Stop reason: SDUPTHER

## 2024-01-31 RX ORDER — QUETIAPINE FUMARATE 25 MG/1
25 TABLET, FILM COATED ORAL NIGHTLY
Qty: 30 TABLET | Refills: 3 | Status: SHIPPED | OUTPATIENT
Start: 2024-01-31 | End: 2024-03-19 | Stop reason: SDUPTHER

## 2024-01-31 RX ORDER — DONEPEZIL HYDROCHLORIDE 10 MG/1
10 TABLET, FILM COATED ORAL NIGHTLY
Qty: 90 TABLET | Refills: 1 | Status: SHIPPED | OUTPATIENT
Start: 2024-01-31 | End: 2024-03-19 | Stop reason: SDUPTHER

## 2024-01-31 RX ORDER — FERROUS SULFATE 325(65) MG
325 TABLET ORAL 2 TIMES DAILY
Qty: 60 TABLET | Refills: 3 | Status: SHIPPED | OUTPATIENT
Start: 2024-01-31 | End: 2024-03-19 | Stop reason: SDUPTHER

## 2024-01-31 RX ORDER — OXYBUTYNIN CHLORIDE 10 MG/1
10 TABLET, EXTENDED RELEASE ORAL DAILY
Qty: 90 TABLET | Refills: 1 | Status: SHIPPED | OUTPATIENT
Start: 2024-01-31 | End: 2024-03-19 | Stop reason: SDUPTHER

## 2024-01-31 RX ORDER — ESCITALOPRAM OXALATE 10 MG/1
10 TABLET ORAL DAILY
Qty: 90 TABLET | Refills: 0 | Status: SHIPPED | OUTPATIENT
Start: 2024-01-31 | End: 2024-03-19 | Stop reason: SDUPTHER

## 2024-03-19 DIAGNOSIS — N32.81 OAB (OVERACTIVE BLADDER): ICD-10-CM

## 2024-03-19 DIAGNOSIS — R19.7 DIARRHEA, UNSPECIFIED TYPE: ICD-10-CM

## 2024-03-19 DIAGNOSIS — I69.154 HEMIPARESIS OF LEFT NONDOMINANT SIDE AS LATE EFFECT OF NONTRAUMATIC INTRACEREBRAL HEMORRHAGE: ICD-10-CM

## 2024-03-19 DIAGNOSIS — F03.B18 MODERATE DEMENTIA WITH OTHER BEHAVIORAL DISTURBANCE, UNSPECIFIED DEMENTIA TYPE: ICD-10-CM

## 2024-03-19 DIAGNOSIS — I25.119 ATHEROSCLEROSIS OF NATIVE CORONARY ARTERY OF NATIVE HEART WITH ANGINA PECTORIS: ICD-10-CM

## 2024-03-19 DIAGNOSIS — D64.9 ANEMIA, UNSPECIFIED TYPE: ICD-10-CM

## 2024-03-19 DIAGNOSIS — E03.9 HYPOTHYROIDISM, UNSPECIFIED TYPE: ICD-10-CM

## 2024-03-19 DIAGNOSIS — G47.00 INSOMNIA, UNSPECIFIED TYPE: ICD-10-CM

## 2024-03-19 DIAGNOSIS — R63.4 WEIGHT LOSS: ICD-10-CM

## 2024-03-19 DIAGNOSIS — L89.152 PRESSURE INJURY OF SACRAL REGION, STAGE 2: ICD-10-CM

## 2024-03-19 NOTE — TELEPHONE ENCOUNTER
No care due was identified.  [facility] Embedded Care Due Messages. Reference number: [id number].   3/19/2024 11:31:56 AM CDT

## 2024-03-19 NOTE — TELEPHONE ENCOUNTER
----- Message from Brittani Moore sent at 3/19/2024 12:00 PM CDT -----  Contact: [phone]  1MEDICALADVICE     Patient is calling for Medical Advice regarding:pts charles is calling     How long has patient had these symptoms:    Pharmacy name and phone#:  [facility] [id number]  [place] [address]  [address]  [place] [address]  Phone: [phone] Fax: [phone]       Would like response via Thinkspeedt:  no     Comments:  Pts daughter is calling she states the pt is needing all of his medications to be filled please advise

## 2024-03-20 RX ORDER — DIPHENOXYLATE HYDROCHLORIDE AND ATROPINE SULFATE 2.5; .025 MG/1; MG/1
TABLET ORAL
Qty: 30 TABLET | Refills: 0 | Status: SHIPPED | OUTPATIENT
Start: 2024-03-20 | End: 2024-04-22 | Stop reason: SDUPTHER

## 2024-03-20 RX ORDER — MIRTAZAPINE 15 MG/1
15 TABLET, FILM COATED ORAL NIGHTLY
Qty: 30 TABLET | Refills: 0 | OUTPATIENT
Start: 2024-03-20

## 2024-03-20 RX ORDER — LEVOTHYROXINE SODIUM 125 UG/1
125 TABLET ORAL
Qty: 90 TABLET | Refills: 1 | Status: SHIPPED | OUTPATIENT
Start: 2024-03-20 | End: 2024-03-22 | Stop reason: SDUPTHER

## 2024-03-20 RX ORDER — DONEPEZIL HYDROCHLORIDE 10 MG/1
10 TABLET, FILM COATED ORAL NIGHTLY
Qty: 90 TABLET | Refills: 1 | Status: SHIPPED | OUTPATIENT
Start: 2024-03-20 | End: 2024-03-22 | Stop reason: SDUPTHER

## 2024-03-20 RX ORDER — ESCITALOPRAM OXALATE 10 MG/1
10 TABLET ORAL DAILY
Qty: 90 TABLET | Refills: 1 | Status: SHIPPED | OUTPATIENT
Start: 2024-03-20 | End: 2024-03-22 | Stop reason: SDUPTHER

## 2024-03-20 RX ORDER — FERROUS SULFATE 325(65) MG
325 TABLET ORAL 2 TIMES DAILY
Qty: 60 TABLET | Refills: 3 | Status: SHIPPED | OUTPATIENT
Start: 2024-03-20 | End: 2024-03-22 | Stop reason: SDUPTHER

## 2024-03-20 RX ORDER — ASPIRIN 81 MG/1
81 TABLET ORAL DAILY
Qty: 90 TABLET | Refills: 3 | Status: SHIPPED | OUTPATIENT
Start: 2024-03-20 | End: 2024-03-22 | Stop reason: SDUPTHER

## 2024-03-20 RX ORDER — DRESSING,ODOR ABSORBENT,H-POLY 4" X 4"
BANDAGE MISCELLANEOUS
Qty: 10 EACH | Refills: 1 | Status: SHIPPED | OUTPATIENT
Start: 2024-03-20 | End: 2024-03-22 | Stop reason: SDUPTHER

## 2024-03-20 RX ORDER — OXYBUTYNIN CHLORIDE 10 MG/1
10 TABLET, EXTENDED RELEASE ORAL DAILY
Qty: 90 TABLET | Refills: 1 | Status: SHIPPED | OUTPATIENT
Start: 2024-03-20 | End: 2024-03-22 | Stop reason: SDUPTHER

## 2024-03-20 RX ORDER — ATORVASTATIN CALCIUM 80 MG/1
80 TABLET, FILM COATED ORAL DAILY
Qty: 90 TABLET | Refills: 0 | Status: SHIPPED | OUTPATIENT
Start: 2024-03-20 | End: 2024-03-22 | Stop reason: SDUPTHER

## 2024-03-20 RX ORDER — QUETIAPINE FUMARATE 25 MG/1
25 TABLET, FILM COATED ORAL NIGHTLY
Qty: 30 TABLET | Refills: 3 | Status: SHIPPED | OUTPATIENT
Start: 2024-03-20 | End: 2024-03-22 | Stop reason: SDUPTHER

## 2024-03-20 NOTE — TELEPHONE ENCOUNTER
Refill Routing Note   Medication(s) are not appropriate for processing by [facility] for the following reason(s):        Outside of protocol  Required labs outdated    [facility] action(s):  Defer  Route  Approve      Medication Therapy Plan: Mirtazapine D/C 6/16/23: alternate therapy. Levothyroxine T4 within normal limits.      Appointments  past 12m or future 3m with PCP    Date Provider   Last Visit   8/11/2023 Hever Carvalho MD   Next Visit   5/2/2024 Hever Carvalho MD   ED visits in past 90 days: 0        Note composed:2:22 AM 03/20/2024

## 2024-03-22 DIAGNOSIS — G47.00 INSOMNIA, UNSPECIFIED TYPE: ICD-10-CM

## 2024-03-22 DIAGNOSIS — I69.154 HEMIPARESIS OF LEFT NONDOMINANT SIDE AS LATE EFFECT OF NONTRAUMATIC INTRACEREBRAL HEMORRHAGE: ICD-10-CM

## 2024-03-22 DIAGNOSIS — R19.7 DIARRHEA, UNSPECIFIED TYPE: ICD-10-CM

## 2024-03-22 DIAGNOSIS — L89.152 PRESSURE INJURY OF SACRAL REGION, STAGE 2: ICD-10-CM

## 2024-03-22 DIAGNOSIS — E03.9 HYPOTHYROIDISM, UNSPECIFIED TYPE: ICD-10-CM

## 2024-03-22 DIAGNOSIS — D64.9 ANEMIA, UNSPECIFIED TYPE: ICD-10-CM

## 2024-03-22 DIAGNOSIS — I25.119 ATHEROSCLEROSIS OF NATIVE CORONARY ARTERY OF NATIVE HEART WITH ANGINA PECTORIS: ICD-10-CM

## 2024-03-22 DIAGNOSIS — N32.81 OAB (OVERACTIVE BLADDER): ICD-10-CM

## 2024-03-22 DIAGNOSIS — F03.B18 MODERATE DEMENTIA WITH OTHER BEHAVIORAL DISTURBANCE, UNSPECIFIED DEMENTIA TYPE: ICD-10-CM

## 2024-03-22 NOTE — TELEPHONE ENCOUNTER
No care due was identified.  [facility] Embedded Care Due Messages. Reference number: [id number].   3/22/2024 3:26:45 PM CDT

## 2024-03-23 RX ORDER — FERROUS SULFATE 325(65) MG
325 TABLET ORAL 2 TIMES DAILY
Qty: 60 TABLET | Refills: 3 | Status: SHIPPED | OUTPATIENT
Start: 2024-03-23 | End: 2024-04-22 | Stop reason: SDUPTHER

## 2024-03-23 RX ORDER — ASPIRIN 81 MG/1
81 TABLET ORAL DAILY
Qty: 90 TABLET | Refills: 3 | Status: SHIPPED | OUTPATIENT
Start: 2024-03-23

## 2024-03-23 RX ORDER — DONEPEZIL HYDROCHLORIDE 10 MG/1
10 TABLET, FILM COATED ORAL NIGHTLY
Qty: 90 TABLET | Refills: 1 | Status: SHIPPED | OUTPATIENT
Start: 2024-03-23 | End: 2024-04-22 | Stop reason: SDUPTHER

## 2024-03-23 RX ORDER — ATORVASTATIN CALCIUM 80 MG/1
80 TABLET, FILM COATED ORAL DAILY
Qty: 90 TABLET | Refills: 0 | Status: SHIPPED | OUTPATIENT
Start: 2024-03-23 | End: 2024-04-22 | Stop reason: SDUPTHER

## 2024-03-23 RX ORDER — OXYBUTYNIN CHLORIDE 10 MG/1
10 TABLET, EXTENDED RELEASE ORAL DAILY
Qty: 90 TABLET | Refills: 1 | Status: SHIPPED | OUTPATIENT
Start: 2024-03-23 | End: 2024-04-22 | Stop reason: SDUPTHER

## 2024-03-23 RX ORDER — QUETIAPINE FUMARATE 25 MG/1
25 TABLET, FILM COATED ORAL NIGHTLY
Qty: 30 TABLET | Refills: 3 | Status: SHIPPED | OUTPATIENT
Start: 2024-03-23 | End: 2024-04-22 | Stop reason: SDUPTHER

## 2024-03-23 RX ORDER — ESCITALOPRAM OXALATE 10 MG/1
10 TABLET ORAL DAILY
Qty: 90 TABLET | Refills: 1 | Status: SHIPPED | OUTPATIENT
Start: 2024-03-23 | End: 2024-04-22 | Stop reason: SDUPTHER

## 2024-03-23 RX ORDER — DIPHENOXYLATE HYDROCHLORIDE AND ATROPINE SULFATE 2.5; .025 MG/1; MG/1
TABLET ORAL
Qty: 30 TABLET | Refills: 0 | OUTPATIENT
Start: 2024-03-23

## 2024-03-23 RX ORDER — DRESSING,ODOR ABSORBENT,H-POLY 4" X 4"
BANDAGE MISCELLANEOUS
Qty: 10 EACH | Refills: 1 | Status: SHIPPED | OUTPATIENT
Start: 2024-03-23

## 2024-03-23 RX ORDER — LEVOTHYROXINE SODIUM 125 UG/1
125 TABLET ORAL
Qty: 90 TABLET | Refills: 1 | Status: SHIPPED | OUTPATIENT
Start: 2024-03-23 | End: 2024-04-22 | Stop reason: SDUPTHER

## 2024-04-22 DIAGNOSIS — G47.00 INSOMNIA, UNSPECIFIED TYPE: ICD-10-CM

## 2024-04-22 DIAGNOSIS — E03.9 HYPOTHYROIDISM, UNSPECIFIED TYPE: ICD-10-CM

## 2024-04-22 DIAGNOSIS — I25.119 ATHEROSCLEROSIS OF NATIVE CORONARY ARTERY OF NATIVE HEART WITH ANGINA PECTORIS: ICD-10-CM

## 2024-04-22 DIAGNOSIS — L89.152 PRESSURE INJURY OF SACRAL REGION, STAGE 2: ICD-10-CM

## 2024-04-22 DIAGNOSIS — N32.81 OAB (OVERACTIVE BLADDER): ICD-10-CM

## 2024-04-22 DIAGNOSIS — F03.B18 MODERATE DEMENTIA WITH OTHER BEHAVIORAL DISTURBANCE, UNSPECIFIED DEMENTIA TYPE: ICD-10-CM

## 2024-04-22 DIAGNOSIS — D64.9 ANEMIA, UNSPECIFIED TYPE: ICD-10-CM

## 2024-04-22 DIAGNOSIS — R19.7 DIARRHEA, UNSPECIFIED TYPE: ICD-10-CM

## 2024-04-22 NOTE — TELEPHONE ENCOUNTER
----- Message from Khushbu Rodriguez sent at 4/22/2024  1:43 PM CDT -----  Contact: [phone]  Requesting an RX refill or new RX.  Is this a refill or new RX: Refill 1  RX name and strength (copy/paste from chart):  All his medications   Is this a 30 day or 90 day RX:   Pharmacy name and phone # (copy/paste from chart):  [place] [id number]   Phone: [phone]  Fax: [phone]        The doctors have asked that we provide their patients with the following 2 reminders -- prescription refills can take up to 72 hours, and a friendly reminder that in the future you can use your [facility] account to request refills:       Patient daughter called, stated that patient needs all his meds to be sent to [facility]. Did not specify each meds. Thank you.

## 2024-04-22 NOTE — TELEPHONE ENCOUNTER
Care Due:                  Date            Visit Type   Department     Provider  --------------------------------------------------------------------------------                                 -                              PRIMARY [facility]  Last Visit: 08-      CARE ([facility])   PRIMARY CARE   Hever Carvalho                              [facility]                              [facility]  Next Visit: 05-      CARE ([facility])   PRIMARY CARE   Hever Carvalho                                                            Last  Test          Frequency    Reason                     Performed    Due Date  --------------------------------------------------------------------------------    Lipid Panel.  12 months..  atorvastatin.............  12- 12-    TSH.........  12 months..  levothyroxine............  07-   07-    [facility] Embedded Care Due Messages. Reference number: [id number].   4/22/2024 1:52:04 PM CDT

## 2024-04-23 RX ORDER — LEVOTHYROXINE SODIUM 125 UG/1
125 TABLET ORAL
Qty: 90 TABLET | Refills: 1 | Status: SHIPPED | OUTPATIENT
Start: 2024-04-23

## 2024-04-23 RX ORDER — ESCITALOPRAM OXALATE 10 MG/1
10 TABLET ORAL DAILY
Qty: 90 TABLET | Refills: 1 | Status: SHIPPED | OUTPATIENT
Start: 2024-04-23

## 2024-04-23 RX ORDER — DIPHENOXYLATE HYDROCHLORIDE AND ATROPINE SULFATE 2.5; .025 MG/1; MG/1
TABLET ORAL
Qty: 30 TABLET | Refills: 0 | Status: SHIPPED | OUTPATIENT
Start: 2024-04-23

## 2024-04-23 RX ORDER — DONEPEZIL HYDROCHLORIDE 10 MG/1
10 TABLET, FILM COATED ORAL NIGHTLY
Qty: 90 TABLET | Refills: 1 | Status: SHIPPED | OUTPATIENT
Start: 2024-04-23

## 2024-04-23 RX ORDER — ATORVASTATIN CALCIUM 80 MG/1
80 TABLET, FILM COATED ORAL DAILY
Qty: 90 TABLET | Refills: 0 | Status: SHIPPED | OUTPATIENT
Start: 2024-04-23

## 2024-04-23 RX ORDER — FERROUS SULFATE 325(65) MG
325 TABLET ORAL 2 TIMES DAILY
Qty: 60 TABLET | Refills: 3 | Status: SHIPPED | OUTPATIENT
Start: 2024-04-23

## 2024-04-23 RX ORDER — OXYBUTYNIN CHLORIDE 10 MG/1
10 TABLET, EXTENDED RELEASE ORAL DAILY
Qty: 90 TABLET | Refills: 1 | Status: SHIPPED | OUTPATIENT
Start: 2024-04-23

## 2024-04-23 RX ORDER — QUETIAPINE FUMARATE 25 MG/1
25 TABLET, FILM COATED ORAL NIGHTLY
Qty: 30 TABLET | Refills: 3 | Status: SHIPPED | OUTPATIENT
Start: 2024-04-23 | End: 2025-04-23

## 2024-05-02 ENCOUNTER — OFFICE VISIT (OUTPATIENT)
Dept: PRIMARY CARE CLINIC | Facility: CLINIC | Age: 84
End: 2024-05-02
Payer: MEDICARE

## 2024-05-02 VITALS
OXYGEN SATURATION: 97 % | DIASTOLIC BLOOD PRESSURE: 70 MMHG | BODY MASS INDEX: 24.53 KG/M2 | HEIGHT: 65 IN | HEART RATE: 71 BPM | SYSTOLIC BLOOD PRESSURE: 118 MMHG | RESPIRATION RATE: 18 BRPM | WEIGHT: 147.25 LBS

## 2024-05-02 DIAGNOSIS — R25.2 LEG CRAMP: Primary | ICD-10-CM

## 2024-05-02 PROCEDURE — 3074F SYST BP LT 130 MM HG: CPT | Mod: CPTII,S$GLB,, | Performed by: INTERNAL MEDICINE

## 2024-05-02 PROCEDURE — 99214 OFFICE O/P EST MOD 30 MIN: CPT | Mod: S$GLB,,, | Performed by: INTERNAL MEDICINE

## 2024-05-02 PROCEDURE — 3078F DIAST BP <80 MM HG: CPT | Mod: CPTII,S$GLB,, | Performed by: INTERNAL MEDICINE

## 2024-05-02 PROCEDURE — 1126F AMNT PAIN NOTED NONE PRSNT: CPT | Mod: CPTII,S$GLB,, | Performed by: INTERNAL MEDICINE

## 2024-05-02 PROCEDURE — 3288F FALL RISK ASSESSMENT DOCD: CPT | Mod: CPTII,S$GLB,, | Performed by: INTERNAL MEDICINE

## 2024-05-02 PROCEDURE — 1101F PT FALLS ASSESS-DOCD LE1/YR: CPT | Mod: CPTII,S$GLB,, | Performed by: INTERNAL MEDICINE

## 2024-05-02 PROCEDURE — 1159F MED LIST DOCD IN RCRD: CPT | Mod: CPTII,S$GLB,, | Performed by: INTERNAL MEDICINE

## 2024-05-02 PROCEDURE — 1160F RVW MEDS BY RX/DR IN RCRD: CPT | Mod: CPTII,S$GLB,, | Performed by: INTERNAL MEDICINE

## 2024-05-02 PROCEDURE — 99999 PR PBB SHADOW E&M-EST. PATIENT-LVL IV: CPT | Mod: PBBFAC,,, | Performed by: INTERNAL MEDICINE

## 2024-05-02 RX ORDER — LANOLIN ALCOHOL/MO/W.PET/CERES
400 CREAM (GRAM) TOPICAL DAILY
Qty: 90 TABLET | Refills: 1 | Status: SHIPPED | OUTPATIENT
Start: 2024-05-02

## 2024-05-02 NOTE — PROGRESS NOTES
Subjective:       Patient ID: Hever Reyes Jr. is a 83 y.o. male.    Chief Complaint: Follow-up and Knee Pain    HPI  Pt visit today for routine f/u he is living with daughter and son in law they have been helping him a lot he is doing much better from last time his wounds are lealed no new elsion no sob cp SANTOS no feer chill no sob cp SANTOS only c/otoday is muscle cramps  no sob cp SANTOS no problems with BM only physical c/o muscle cramps all over body  Review of Systems   Constitutional:  Negative for unexpected weight change.   HENT:  Positive for congestion.    Respiratory:  Negative for shortness of breath and wheezing.    Cardiovascular:  Negative for chest pain.   Gastrointestinal:  Negative for abdominal pain.   Musculoskeletal:  Positive for arthralgias and back pain.   Psychiatric/Behavioral:  Negative for dysphoric mood.        Objective:      Physical Exam  Vitals and nursing note reviewed.   Constitutional:       General: He is not in acute distress.     Appearance: He is well-developed.   HENT:      Head: Normocephalic and atraumatic.      Right Ear: External ear normal.      Left Ear: External ear normal.      Nose: Nose normal.   Eyes:      General:         Right eye: No discharge.      Conjunctiva/sclera: Conjunctivae normal.      Pupils: Pupils are equal, round, and reactive to light.   Neck:      Thyroid: No thyromegaly.   Cardiovascular:      Rate and Rhythm: Normal rate and regular rhythm.      Heart sounds: Normal heart sounds. No murmur heard.     No friction rub. No gallop.   Pulmonary:      Effort: Pulmonary effort is normal. No respiratory distress.      Breath sounds: Normal breath sounds. No wheezing.   Abdominal:      General: Bowel sounds are normal. There is no distension.      Palpations: Abdomen is soft.      Tenderness: There is no abdominal tenderness.   Musculoskeletal:         General: No tenderness or deformity. Normal range of motion.      Cervical back: Normal range of motion and  "neck supple.   Lymphadenopathy:      Cervical: No cervical adenopathy.   Skin:     General: Skin is warm and dry.      Capillary Refill: Capillary refill takes less than 2 seconds.      Findings: No erythema or rash.   Neurological:      Mental Status: He is alert and oriented to person, place, and time.   Psychiatric:         Thought Content: Thought content normal.         Judgment: Judgment normal.         Assessment:       1. Leg cramp        Plan:       Leg cramp  -     magnesium oxide (MAG-OX) 400 mg (241.3 mg magnesium) tablet; Take 1 tablet (400 mg total) by mouth once daily.  Dispense: 90 tablet; Refill: 1        Medication List with Changes/Refills   New Medications    MAGNESIUM OXIDE (MAG-OX) 400 MG (241.3 MG MAGNESIUM) TABLET    Take 1 tablet (400 mg total) by mouth once daily.   Current Medications    ASPIRIN (ECOTRIN) 81 MG EC TABLET    Take 1 tablet (81 mg total) by mouth once daily.    ATORVASTATIN (LIPITOR) 80 MG TABLET    Take 1 tablet (80 mg total) by mouth once daily.    DIPHENOXYLATE-ATROPINE 2.5-0.025 MG (LOMOTIL) 2.5-0.025 MG PER TABLET    TAKE 1 TABLET BY MOUTH TWICE DAILY AS NEEDED FOR DIARRHEA    DONEPEZIL (ARICEPT) 10 MG TABLET    Take 1 tablet (10 mg total) by mouth every evening.    ESCITALOPRAM OXALATE (LEXAPRO) 10 MG TABLET    Take 1 tablet (10 mg total) by mouth once daily.    FERROUS SULFATE (FEOSOL) 325 MG (65 MG IRON) TAB TABLET    Take 1 tablet (325 mg total) by mouth 2 (two) times daily.    HYDROCOLLOID DRESSING (DUODERM HYDROACTIVE) 4 X 4 " BNDG    Apply decubitus q 3 days    LEVOTHYROXINE (SYNTHROID) 125 MCG TABLET    Take 1 tablet (125 mcg total) by mouth before breakfast.    OXYBUTYNIN (DITROPAN-XL) 10 MG 24 HR TABLET    Take 1 tablet (10 mg total) by mouth once daily.    QUETIAPINE (SEROQUEL) 25 MG TAB    Take 1 tablet (25 mg total) by mouth every evening.        "

## 2024-05-07 ENCOUNTER — TELEPHONE (OUTPATIENT)
Dept: ADMINISTRATIVE | Facility: CLINIC | Age: 84
End: 2024-05-07
Payer: MEDICARE

## 2024-05-21 ENCOUNTER — TELEPHONE (OUTPATIENT)
Dept: PRIMARY CARE CLINIC | Facility: CLINIC | Age: 84
End: 2024-05-21
Payer: MEDICARE

## 2024-05-21 NOTE — TELEPHONE ENCOUNTER
----- Message from Braeden Lee sent at 5/21/2024 12:43 PM CDT -----  1MEDICALADVICE     Patient is calling for Medical Advice regarding:   Pt home health nurse calling saying  thatthey need a call back to discuss some rx please advise    How long has patient had these symptoms:    Pharmacy name and phone#:    Would like response via [employer]:  sweta ronquillo [phone]    Comments:

## 2024-05-22 ENCOUNTER — TELEPHONE (OUTPATIENT)
Dept: PRIMARY CARE CLINIC | Facility: CLINIC | Age: 84
End: 2024-05-22
Payer: MEDICARE

## 2024-05-22 NOTE — TELEPHONE ENCOUNTER
----- Message from Cindy Reyes sent at 5/22/2024  9:30 AM CDT -----  Contact: sweta ronquillo [phone]/[facility]  1MEDICALADVICE     Patient is calling for Medical Advice regarding:Pt home health nurse Sweta is  calling saying  that they need a call back to discuss some rx please advise  Sweta ronquillo [phone]/[facility]      Comments: 2 ND CALL  
Tried calling home health nurse. No answer, LVM to return call.   
Yes

## 2024-06-14 ENCOUNTER — TELEPHONE (OUTPATIENT)
Dept: PRIMARY CARE CLINIC | Facility: CLINIC | Age: 84
End: 2024-06-14
Payer: MEDICARE

## 2024-06-14 NOTE — TELEPHONE ENCOUNTER
----- Message from Elliott Conroy sent at 6/13/2024  4:00 PM CDT -----  Contact: [phone]@[email or web address]  Good afternoon MsColeDerrick from [facility] would like a call back from the nurse to discuss patient med. Please call Ms.Derrick to advise [phone]

## 2024-06-25 DIAGNOSIS — R19.7 DIARRHEA, UNSPECIFIED TYPE: ICD-10-CM

## 2024-06-25 RX ORDER — DIPHENOXYLATE HYDROCHLORIDE AND ATROPINE SULFATE 2.5; .025 MG/1; MG/1
TABLET ORAL
Qty: 30 TABLET | Refills: 0 | Status: SHIPPED | OUTPATIENT
Start: 2024-06-25

## 2024-07-05 ENCOUNTER — TELEPHONE (OUTPATIENT)
Dept: PRIMARY CARE CLINIC | Facility: CLINIC | Age: 84
End: 2024-07-05
Payer: MEDICARE

## 2024-07-05 DIAGNOSIS — I69.154 HEMIPARESIS OF LEFT NONDOMINANT SIDE AS LATE EFFECT OF NONTRAUMATIC INTRACEREBRAL HEMORRHAGE: Primary | ICD-10-CM

## 2024-07-05 NOTE — TELEPHONE ENCOUNTER
----- Message from Khushbu Rodriguez sent at 7/5/2024 11:13 AM CDT -----  Contact: [phone]  Patient called, was advise by Neurology for patient to get back on plavix ASAP/Today, patient is asking for pcp to sent plavix to [place] [id number] [facility] [address] [facility] Phone: [phone]  Fax: [phone]. Thank you

## 2024-07-06 ENCOUNTER — NURSE TRIAGE (OUTPATIENT)
Dept: ADMINISTRATIVE | Facility: CLINIC | Age: 84
End: 2024-07-06
Payer: MEDICARE

## 2024-07-06 NOTE — TELEPHONE ENCOUNTER
Spoke with daughter of pt who reports that father is suppose to have caretaker, and food assistance. Also reports that pt has home health but has only gotten 1 visit. Pt was admitted to [facility]. Number noted in chart given to daughter of pt.     Spoke with [facility] who states that pt has been getting home health visits once a week since 5/21. States upcoming visit this week.    Called daughter back to inform. No contact made. VM left  Reason for Disposition   General information question, no triage required and triager able to answer question    Protocols used: Information Only Call - No Triage-A-

## 2024-07-08 RX ORDER — CLOPIDOGREL BISULFATE 75 MG/1
75 TABLET ORAL DAILY
Qty: 30 TABLET | Refills: 11 | Status: SHIPPED | OUTPATIENT
Start: 2024-07-08 | End: 2025-07-08

## 2024-07-08 NOTE — TELEPHONE ENCOUNTER
Spoke with the pt daughter about his medication the daughter informed me that the medication has not been sent in .Can you please send in his prescription for the plavix to the [facility] in [place].

## 2024-07-16 ENCOUNTER — TELEPHONE (OUTPATIENT)
Dept: PRIMARY CARE CLINIC | Facility: CLINIC | Age: 84
End: 2024-07-16
Payer: MEDICARE

## 2024-07-16 NOTE — TELEPHONE ENCOUNTER
----- Message from Brit Kwan sent at 7/15/2024  4:48 PM CDT -----  Contact: [phone] Patient  Pts daughter is calling in regards to letting Dr Carvalho know the pt is now on Hospice. Pts daughter is asking for a call back please.  Thank you.

## 2024-07-16 NOTE — TELEPHONE ENCOUNTER
----- Message from Frances Mariam sent at 7/16/2024  9:18 AM CDT -----  Regarding: Patient Returning Missed Call  Patient is returning missed call .     Pts call back- [phone] Pt states calls are not coming thru, please call back to back.

## 2024-07-16 NOTE — TELEPHONE ENCOUNTER
----- Message from Jesi Ramirez sent at 7/16/2024  8:56 AM CDT -----  Regarding: pt advice  Contact: feroz Fragoso @[phone]  Caller returning call from staff. Pls call to discuss.

## 2024-07-29 ENCOUNTER — TELEPHONE (OUTPATIENT)
Dept: PRIMARY CARE CLINIC | Facility: CLINIC | Age: 84
End: 2024-07-29
Payer: MEDICARE

## 2024-07-29 NOTE — TELEPHONE ENCOUNTER
----- Message from Payal Oliveira sent at 7/26/2024 11:32 AM CDT -----  Contact: Fatoumataenedina with [facility] phone [phone] fax [phone]  Calling to inquire if the order to Discontinue Home Health has been signed. Please call her. Thanks.

## 2024-08-06 ENCOUNTER — TELEPHONE (OUTPATIENT)
Dept: PRIMARY CARE CLINIC | Facility: CLINIC | Age: 84
End: 2024-08-06
Payer: MEDICARE

## 2024-08-20 ENCOUNTER — DOCUMENT SCAN (OUTPATIENT)
Dept: HOME HEALTH SERVICES | Facility: HOSPITAL | Age: 84
End: 2024-08-20
Payer: MEDICARE

## 2025-03-24 DIAGNOSIS — Z00.00 ENCOUNTER FOR MEDICARE ANNUAL WELLNESS EXAM: ICD-10-CM
